# Patient Record
Sex: FEMALE | Race: BLACK OR AFRICAN AMERICAN | NOT HISPANIC OR LATINO | Employment: FULL TIME
[De-identification: names, ages, dates, MRNs, and addresses within clinical notes are randomized per-mention and may not be internally consistent; named-entity substitution may affect disease eponyms.]

---

## 2017-02-01 ENCOUNTER — CHARTING TRANS (OUTPATIENT)
Dept: OTHER | Age: 32
End: 2017-02-01

## 2017-02-08 ENCOUNTER — HOSPITAL ENCOUNTER (EMERGENCY)
Facility: HOSPITAL | Age: 32
Discharge: HOME | End: 2017-02-08
Attending: EMERGENCY MEDICINE
Payer: MEDICAID

## 2017-02-08 VITALS
SYSTOLIC BLOOD PRESSURE: 115 MMHG | BODY MASS INDEX: 18 KG/M2 | HEART RATE: 92 BPM | DIASTOLIC BLOOD PRESSURE: 80 MMHG | WEIGHT: 101 LBS | RESPIRATION RATE: 14 BRPM | TEMPERATURE: 98 F | OXYGEN SATURATION: 99 %

## 2017-02-08 DIAGNOSIS — T85.848A DENTAL IMPLANT PAIN, INITIAL ENCOUNTER: Primary | ICD-10-CM

## 2017-02-08 PROCEDURE — 99282 EMERGENCY DEPT VISIT SF MDM: CPT

## 2017-02-08 RX ORDER — AMOXICILLIN 500 MG/1
500 CAPSULE ORAL 3 TIMES DAILY
COMMUNITY
End: 2017-02-23 | Stop reason: ALTCHOICE

## 2017-02-09 NOTE — ED PROVIDER NOTES
Patient Seen in: [facility] Emergency Department    History   Patient presents with:  Dental Problem (dental)    Stated Complaint: dental pain    HPI    Patient is a 19-year-old who presents with long-standing dental pain.   Says she is having worsen Lungs are clear to auscultation bilaterally. HEART: Regular rate and rhythm,, no  murmurs. ABDOMEN: Soft, nontender, nondistended,   EXTREMITIES: Peripheral pulses are brisk in all 4 extremities. Normal capillary refill.   SKIN: Well perfused, without

## 2017-02-23 ENCOUNTER — APPOINTMENT (OUTPATIENT)
Dept: GENERAL RADIOLOGY | Facility: HOSPITAL | Age: 32
End: 2017-02-23
Payer: MEDICAID

## 2017-02-23 ENCOUNTER — HOSPITAL ENCOUNTER (EMERGENCY)
Facility: HOSPITAL | Age: 32
Discharge: HOME | End: 2017-02-24
Attending: EMERGENCY MEDICINE
Payer: MEDICAID

## 2017-02-23 DIAGNOSIS — R10.13 ABDOMINAL PAIN, EPIGASTRIC: Primary | ICD-10-CM

## 2017-02-23 LAB
ALBUMIN SERPL-MCNC: 3.3 G/DL (ref 3.5–4.8)
ALP LIVER SERPL-CCNC: 66 U/L (ref 37–98)
ALT SERPL-CCNC: 20 U/L (ref 14–54)
AST SERPL-CCNC: 14 U/L (ref 15–41)
BASOPHILS # BLD AUTO: 0.05 X10(3) UL (ref 0–0.1)
BASOPHILS NFR BLD AUTO: 0.8 %
BILIRUB SERPL-MCNC: 0.3 MG/DL (ref 0.1–2)
BUN BLD-MCNC: 11 MG/DL (ref 8–20)
CALCIUM BLD-MCNC: 8.8 MG/DL (ref 8.3–10.3)
CHLORIDE: 110 MMOL/L (ref 101–111)
CO2: 28 MMOL/L (ref 22–32)
CREAT BLD-MCNC: 0.63 MG/DL (ref 0.55–1.02)
EOSINOPHIL # BLD AUTO: 0.26 X10(3) UL (ref 0–0.3)
EOSINOPHIL NFR BLD AUTO: 4.3 %
ERYTHROCYTE [DISTWIDTH] IN BLOOD BY AUTOMATED COUNT: 13 % (ref 11.5–16)
GLUCOSE BLD-MCNC: 90 MG/DL (ref 70–99)
HCT VFR BLD AUTO: 32.3 % (ref 34–50)
HGB BLD-MCNC: 10.8 G/DL (ref 12–16)
IMMATURE GRANULOCYTE COUNT: 0 X10(3) UL (ref 0–1)
IMMATURE GRANULOCYTE RATIO %: 0 %
LIPASE: 237 U/L (ref 73–393)
LYMPHOCYTES # BLD AUTO: 2.75 X10(3) UL (ref 0.9–4)
LYMPHOCYTES NFR BLD AUTO: 45.2 %
M PROTEIN MFR SERPL ELPH: 6.4 G/DL (ref 6.1–8.3)
MCH RBC QN AUTO: 30.9 PG (ref 27–33.2)
MCHC RBC AUTO-ENTMCNC: 33.4 G/DL (ref 31–37)
MCV RBC AUTO: 92.6 FL (ref 81–100)
MONOCYTES # BLD AUTO: 0.45 X10(3) UL (ref 0.1–0.6)
MONOCYTES NFR BLD AUTO: 7.4 %
NEUTROPHIL ABS PRELIM: 2.57 X10 (3) UL (ref 1.3–6.7)
NEUTROPHILS # BLD AUTO: 2.57 X10(3) UL (ref 1.3–6.7)
NEUTROPHILS NFR BLD AUTO: 42.3 %
PLATELET # BLD AUTO: 246 10(3)UL (ref 150–450)
POTASSIUM SERPL-SCNC: 3.9 MMOL/L (ref 3.6–5.1)
RBC # BLD AUTO: 3.49 X10(6)UL (ref 3.8–5.1)
RED CELL DISTRIBUTION WIDTH-SD: 43.9 FL (ref 35.1–46.3)
SODIUM SERPL-SCNC: 142 MMOL/L (ref 136–144)
TROPONIN: <0.046 NG/ML (ref ?–0.05)
WBC # BLD AUTO: 6.1 X10(3) UL (ref 4–13)

## 2017-02-23 PROCEDURE — 84484 ASSAY OF TROPONIN QUANT: CPT | Performed by: EMERGENCY MEDICINE

## 2017-02-23 PROCEDURE — 85025 COMPLETE CBC W/AUTO DIFF WBC: CPT | Performed by: EMERGENCY MEDICINE

## 2017-02-23 PROCEDURE — 36415 COLL VENOUS BLD VENIPUNCTURE: CPT

## 2017-02-23 PROCEDURE — 83690 ASSAY OF LIPASE: CPT | Performed by: EMERGENCY MEDICINE

## 2017-02-23 PROCEDURE — 80053 COMPREHEN METABOLIC PANEL: CPT | Performed by: EMERGENCY MEDICINE

## 2017-02-23 PROCEDURE — 99284 EMERGENCY DEPT VISIT MOD MDM: CPT

## 2017-02-23 PROCEDURE — 71010 XR CHEST AP PORTABLE  (CPT=71010): CPT

## 2017-02-23 PROCEDURE — 93010 ELECTROCARDIOGRAM REPORT: CPT

## 2017-02-23 PROCEDURE — 99285 EMERGENCY DEPT VISIT HI MDM: CPT

## 2017-02-23 PROCEDURE — 93005 ELECTROCARDIOGRAM TRACING: CPT

## 2017-02-23 RX ORDER — ASPIRIN 81 MG/1
324 TABLET, CHEWABLE ORAL ONCE
Status: COMPLETED | OUTPATIENT
Start: 2017-02-23 | End: 2017-02-23

## 2017-02-24 VITALS
SYSTOLIC BLOOD PRESSURE: 102 MMHG | HEIGHT: 63 IN | DIASTOLIC BLOOD PRESSURE: 68 MMHG | BODY MASS INDEX: 19.49 KG/M2 | HEART RATE: 76 BPM | OXYGEN SATURATION: 100 % | TEMPERATURE: 98 F | WEIGHT: 110 LBS | RESPIRATION RATE: 14 BRPM

## 2017-02-24 LAB
ATRIAL RATE: 72 BPM
P AXIS: 76 DEGREES
P-R INTERVAL: 124 MS
Q-T INTERVAL: 394 MS
QRS DURATION: 90 MS
QTC CALCULATION (BEZET): 431 MS
R AXIS: 77 DEGREES
T AXIS: 69 DEGREES
VENTRICULAR RATE: 72 BPM

## 2017-02-24 NOTE — ED INITIAL ASSESSMENT (HPI)
Patient was getting up to go to work and she woke up with sharp pain in her chest, mid-sternal chest pain. She states she also feels bloated. She states the pain is worse with movement.

## 2017-02-24 NOTE — ED PROVIDER NOTES
Patient Seen in: [facility] Emergency Department    History   Patient presents with:  Chest Pain Angina (cardiovascular)    Stated Complaint: Chest Pain    HPI    The patient is a 42-year-old female presenting to the emergency department due to abdo Device 02/23/17 2233 None (Room air)       Current:/68 mmHg  Pulse 76  Temp(Src) 97.5 °F (36.4 °C) (Temporal)  Resp 14  Ht 160 cm (5' 3\")  Wt 49.896 kg  BMI 19.49 kg/m2  SpO2 100%  LMP 02/22/2017 (Exact Date)        Physical Exam    General: Alert a orders. RAINBOW DRAW BLUE   RAINBOW DRAW GOLD   RAINBOW DRAW LAVENDER   RAINBOW DRAW LIGHT GREEN      EKG    Rate, intervals and axes as noted on EKG Report.   Rate: 72  Rhythm: Sinus Rhythm  Reading: Normal sinus rhythm without any ectopy, normal axis, n with her, she felt comfortable going home, she was discharged home in stable condition.       Disposition and Plan     Clinical Impression:  Abdominal pain, epigastric  (primary encounter diagnosis)    Disposition:  Discharge    Follow-up:  Clarissa Daugherty

## 2017-03-10 ENCOUNTER — HOSPITAL ENCOUNTER (EMERGENCY)
Age: 32
Discharge: HOME | End: 2017-03-10
Attending: EMERGENCY MEDICINE
Payer: MEDICAID

## 2017-03-10 VITALS
HEIGHT: 63 IN | TEMPERATURE: 98 F | HEART RATE: 95 BPM | RESPIRATION RATE: 22 BRPM | DIASTOLIC BLOOD PRESSURE: 74 MMHG | SYSTOLIC BLOOD PRESSURE: 109 MMHG | WEIGHT: 110 LBS | BODY MASS INDEX: 19.49 KG/M2 | OXYGEN SATURATION: 100 %

## 2017-03-10 DIAGNOSIS — M54.40 BACK PAIN OF LUMBAR REGION WITH SCIATICA: Primary | ICD-10-CM

## 2017-03-10 PROCEDURE — 99283 EMERGENCY DEPT VISIT LOW MDM: CPT

## 2017-03-10 RX ORDER — METHYLPREDNISOLONE 4 MG/1
TABLET ORAL
Qty: 1 PACKAGE | Refills: 0 | Status: SHIPPED | OUTPATIENT
Start: 2017-03-10 | End: 2017-03-15

## 2017-03-10 RX ORDER — ETODOLAC 400 MG/1
400 TABLET, FILM COATED ORAL 2 TIMES DAILY
Qty: 14 TABLET | Refills: 0 | Status: SHIPPED | OUTPATIENT
Start: 2017-03-10 | End: 2017-03-17

## 2017-03-11 NOTE — ED PROVIDER NOTES
Patient Seen in: [facility] Emergency Department In [place]    History   Patient presents with:  Back Pain (musculoskeletal)    Stated Complaint: back pain    HPI    29-year-old female presents emergency room with chief complaint of left low back pain.   P SpO2 100%  LMP 02/22/2017 (Exact Date)        Physical Exam    GENERAL: Patient is awake, alert, well-appearing, in no acute distress. HEENT: no scleral icterus.   Mucous membranes are moist,  Back: No midline thoracic or lumbar spine tenderness or step-of daily.  Qty: 14 tablet Refills: 0    methylPREDNISolone 4 MG Oral Tablet Therapy Pack  Dosepack: use as directed on packaging  Qty: 1 Package Refills: 0

## 2017-04-05 ENCOUNTER — HOSPITAL ENCOUNTER (EMERGENCY)
Facility: HOSPITAL | Age: 32
Discharge: LEFT BEFORE DISCHARGE | End: 2017-04-05
Payer: MEDICAID

## 2017-05-24 ENCOUNTER — APPOINTMENT (OUTPATIENT)
Dept: GENERAL RADIOLOGY | Facility: HOSPITAL | Age: 32
End: 2017-05-24
Attending: EMERGENCY MEDICINE
Payer: MEDICAID

## 2017-05-24 ENCOUNTER — HOSPITAL ENCOUNTER (EMERGENCY)
Facility: HOSPITAL | Age: 32
Discharge: HOME | End: 2017-05-24
Attending: EMERGENCY MEDICINE
Payer: MEDICAID

## 2017-05-24 VITALS
RESPIRATION RATE: 16 BRPM | SYSTOLIC BLOOD PRESSURE: 106 MMHG | DIASTOLIC BLOOD PRESSURE: 67 MMHG | HEART RATE: 107 BPM | BODY MASS INDEX: 19.49 KG/M2 | HEIGHT: 63 IN | WEIGHT: 110 LBS | TEMPERATURE: 99 F | OXYGEN SATURATION: 100 %

## 2017-05-24 DIAGNOSIS — M54.32 SCIATICA OF LEFT SIDE: Primary | ICD-10-CM

## 2017-05-24 PROCEDURE — 99283 EMERGENCY DEPT VISIT LOW MDM: CPT

## 2017-05-24 PROCEDURE — 81025 URINE PREGNANCY TEST: CPT

## 2017-05-24 RX ORDER — HYDROCODONE BITARTRATE AND ACETAMINOPHEN 5; 325 MG/1; MG/1
1-2 TABLET ORAL EVERY 6 HOURS PRN
Qty: 15 TABLET | Refills: 0 | Status: SHIPPED | OUTPATIENT
Start: 2017-05-24 | End: 2017-07-25

## 2017-05-24 RX ORDER — IBUPROFEN 600 MG/1
600 TABLET ORAL ONCE
Status: DISCONTINUED | OUTPATIENT
Start: 2017-05-24 | End: 2017-05-24

## 2017-05-24 RX ORDER — PREDNISONE 20 MG/1
40 TABLET ORAL DAILY
Qty: 10 TABLET | Refills: 0 | Status: SHIPPED | OUTPATIENT
Start: 2017-05-24 | End: 2017-05-29

## 2017-05-24 RX ORDER — CYCLOBENZAPRINE HCL 10 MG
10 TABLET ORAL 3 TIMES DAILY PRN
COMMUNITY
End: 2017-07-25

## 2017-05-24 NOTE — ED PROVIDER NOTES
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 PSFH elements reviewed from today and agreed except as otherwise stated in HPI.     Physical Exam       ED Triage Vitals   BP 05/24/17 1521 106/67 mmHg   Pulse 05/24/17 1521 107   Resp 05/24/17 1521 16   Temp 05/24/17 1521 98.8 °F (37.1 °C) Deep tendon reflexes are 2+ bilateral both lower extremity's. There is no evidence of any footdrop appreciated. Patient is ambulatory in the ER with a steady gait and no ataxia.           ED Course     Labs Reviewed   POCT PREGNANCY, URINE - Normal

## 2017-05-24 NOTE — ED INITIAL ASSESSMENT (HPI)
Patient with bilateral leg pain, hx of sciatica. Pain started in December, she states the pain is bilateral to both sides of her back. She's been taking tylenol and flexeril at home without improvement.  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

## 2017-06-27 ENCOUNTER — HOSPITAL ENCOUNTER (EMERGENCY)
Facility: HOSPITAL | Age: 32
Discharge: HOME | End: 2017-06-27
Attending: EMERGENCY MEDICINE
Payer: MEDICAID

## 2017-06-27 VITALS
HEART RATE: 97 BPM | DIASTOLIC BLOOD PRESSURE: 66 MMHG | SYSTOLIC BLOOD PRESSURE: 101 MMHG | BODY MASS INDEX: 19.84 KG/M2 | TEMPERATURE: 98 F | WEIGHT: 112 LBS | HEIGHT: 63 IN | OXYGEN SATURATION: 100 % | RESPIRATION RATE: 18 BRPM

## 2017-06-27 DIAGNOSIS — K08.89 TOOTHACHE: Primary | ICD-10-CM

## 2017-06-27 PROCEDURE — 99283 EMERGENCY DEPT VISIT LOW MDM: CPT

## 2017-06-27 RX ORDER — AMOXICILLIN 875 MG/1
875 TABLET, COATED ORAL 2 TIMES DAILY
Qty: 20 TABLET | Refills: 0 | Status: SHIPPED | OUTPATIENT
Start: 2017-06-27 | End: 2017-07-07

## 2017-06-27 RX ORDER — HYDROCODONE BITARTRATE AND ACETAMINOPHEN 5; 325 MG/1; MG/1
1-2 TABLET ORAL EVERY 4 HOURS PRN
Qty: 20 TABLET | Refills: 0 | Status: SHIPPED | OUTPATIENT
Start: 2017-06-27 | End: 2017-07-04

## 2017-06-27 RX ORDER — CHLORHEXIDINE GLUCONATE 0.12 MG/ML
15 RINSE ORAL 2 TIMES DAILY
Qty: 118 ML | Refills: 0 | Status: SHIPPED | OUTPATIENT
Start: 2017-06-27 | End: 2017-07-25

## 2017-06-28 NOTE — ED PROVIDER NOTES
Patient Seen in: [facility] Emergency Department    History   Patient presents with:  Dental Problem (dental)    Stated Complaint: dental pain    HPI    Worsening toothache on the left upper side over the past few days. No fever or chills.   Patient generally healthy  Skin: Warm and dry without cyanosis or pallor  HEENT: No swelling, erythema, tenderness to the face. Tooth #16 is just barely erupting. There is surrounding erythema and gingival tenderness. No drainage. No evidence of abscess.

## 2017-06-28 NOTE — ED NOTES
Patient on call light x2, asking for x2 blankets and x2 popsicles. Patient provided with warm blanket and popsicle.

## 2017-07-25 ENCOUNTER — HOSPITAL ENCOUNTER (EMERGENCY)
Facility: HOSPITAL | Age: 32
Discharge: HOME | End: 2017-07-25
Attending: EMERGENCY MEDICINE
Payer: MEDICAID

## 2017-07-25 VITALS
WEIGHT: 116 LBS | RESPIRATION RATE: 18 BRPM | HEIGHT: 63 IN | BODY MASS INDEX: 20.55 KG/M2 | DIASTOLIC BLOOD PRESSURE: 65 MMHG | TEMPERATURE: 99 F | OXYGEN SATURATION: 99 % | SYSTOLIC BLOOD PRESSURE: 113 MMHG | HEART RATE: 102 BPM

## 2017-07-25 DIAGNOSIS — M54.40 BACK PAIN OF LUMBAR REGION WITH SCIATICA: Primary | ICD-10-CM

## 2017-07-25 PROCEDURE — 99283 EMERGENCY DEPT VISIT LOW MDM: CPT

## 2017-07-25 RX ORDER — HYDROCODONE BITARTRATE AND ACETAMINOPHEN 5; 325 MG/1; MG/1
1-2 TABLET ORAL EVERY 4 HOURS PRN
Qty: 20 TABLET | Refills: 0 | Status: SHIPPED | OUTPATIENT
Start: 2017-07-25 | End: 2017-08-01

## 2017-07-25 RX ORDER — PRENATAL VIT/IRON FUM/FOLIC AC 27MG-0.8MG
1 TABLET ORAL DAILY
COMMUNITY
End: 2018-06-05

## 2017-07-25 NOTE — ED PROVIDER NOTES
Patient Seen in: [facility] Emergency Department    History   Patient presents with:  Back Pain (musculoskeletal)    Stated Complaint: back pain, 20 weeks pregnant.  Talked with OB    HPI    79-year-old female comes the hospital that she complained o 3\")   Wt 52.6 kg   LMP 03/05/2017   SpO2 99%   BMI 20.55 kg/m²         Physical Exam    HEENT : NCAT, EOMI, PEERL, throat clear, neck supple, no JVD, trachea midline, No LAD  Heart: S1S2 normal. No murmurs, regular rate and rhythm  Lungs: Clear to auscult

## 2017-07-25 NOTE — ED INITIAL ASSESSMENT (HPI)
Patient c/o right lower back pain that is radiating down her leg that began yesterday. Patient is 20 weeks pregnant, . Taking extra strength tylenol with no relief.

## 2017-08-11 ENCOUNTER — HOSPITAL ENCOUNTER (EMERGENCY)
Facility: HOSPITAL | Age: 32
Discharge: HOME | End: 2017-08-11
Attending: EMERGENCY MEDICINE
Payer: MEDICAID

## 2017-08-11 VITALS
HEIGHT: 63 IN | OXYGEN SATURATION: 100 % | SYSTOLIC BLOOD PRESSURE: 108 MMHG | DIASTOLIC BLOOD PRESSURE: 61 MMHG | BODY MASS INDEX: 21.79 KG/M2 | HEART RATE: 87 BPM | RESPIRATION RATE: 18 BRPM | TEMPERATURE: 98 F | WEIGHT: 123 LBS

## 2017-08-11 DIAGNOSIS — M54.40 BACK PAIN OF LUMBAR REGION WITH SCIATICA: Primary | ICD-10-CM

## 2017-08-11 PROCEDURE — 99282 EMERGENCY DEPT VISIT SF MDM: CPT

## 2017-08-11 NOTE — ED INITIAL ASSESSMENT (HPI)
Pt sts that she has pain shooting down both legs from lower back. Pain x 3 days. Denies any other complaints at this time.  Pt is 6mo pregnant

## 2017-08-11 NOTE — ED PROVIDER NOTES
Patient Seen in: [facility] Emergency Department    History   No chief complaint on file. Stated Complaint: back pain    HPI    74-year-old female presents with chronic lower back pain that radiates down bilateral legs.   She states that she tried noted above. PSFH elements reviewed from today and agreed except as otherwise stated in HPI.     Physical Exam   ED Triage Vitals [08/11/17 1258]  BP: 108/61  Pulse: 87  Resp: 18  Temp: 98 °F (36.7 °C)  Temp src: Temporal  SpO2: 100 %  O2 Device: None (R as needed. Patient is neurologically intact with no bowel or bladder retention or incontinence reported. She has no weakness or numbness in the lower extremities. Normal gait. Patient eloped after my discussion.         Disposition and Plan     Clinic

## 2017-08-16 ENCOUNTER — CHARTING TRANS (OUTPATIENT)
Dept: OTHER | Age: 32
End: 2017-08-16

## 2017-08-22 ENCOUNTER — HOSPITAL ENCOUNTER (EMERGENCY)
Facility: HOSPITAL | Age: 32
Discharge: HOME | End: 2017-08-22
Payer: MEDICAID

## 2017-08-22 VITALS
HEIGHT: 63 IN | SYSTOLIC BLOOD PRESSURE: 95 MMHG | HEART RATE: 97 BPM | DIASTOLIC BLOOD PRESSURE: 50 MMHG | RESPIRATION RATE: 18 BRPM | OXYGEN SATURATION: 99 %

## 2017-08-22 DIAGNOSIS — M54.40 BACK PAIN OF LUMBAR REGION WITH SCIATICA: Primary | ICD-10-CM

## 2017-08-22 PROCEDURE — 99283 EMERGENCY DEPT VISIT LOW MDM: CPT

## 2017-08-22 RX ORDER — LIDOCAINE 50 MG/G
1 PATCH TOPICAL EVERY 24 HOURS
Qty: 4 PATCH | Refills: 0 | Status: SHIPPED | OUTPATIENT
Start: 2017-08-22 | End: 2017-10-26

## 2017-08-22 RX ORDER — ACETAMINOPHEN 500 MG
1000 TABLET ORAL ONCE
Status: COMPLETED | OUTPATIENT
Start: 2017-08-22 | End: 2017-08-22

## 2017-08-23 NOTE — ED NOTES
Patient being discharged home  Patient demonstrates improvement upon d/c. Pt verbalized understanding of d/c instructions. AVS provided.    Home by self

## 2017-08-23 NOTE — ED NOTES
Agree with triage note. Pt reports sciatica pain after lifting at work and being on feet all day. Hx of back pain and sciatica. Pt seen in ED several times for same. Pt requesting muscle relaxers and narcotics. MD notified.  Pt irritated with staff and kiera

## 2017-08-23 NOTE — ED PROVIDER NOTES
Patient Seen in: [facility] Emergency Department    History   Patient presents with:  Pain (neurologic)    Stated Complaint: sciatica     HPI    Patient presents the ER with complaint of chronic low back pain, right lumbar area radiates into the rig HPI.    Physical Exam   ED Triage Vitals [08/22/17 2111]  BP: 94/69  Pulse: 98  Resp: 18  Temp: n/a  Temp src: n/a  SpO2: 99 %  O2 Device: None (Room air)    Current:BP 95/50   Pulse 97   Resp 18   Ht 160 cm (5' 3\")   LMP 03/05/2017   SpO2 99%   Breastfee that should prompt the patient's immediate return to the emergency department. Reasonable over the counter and prescription treatment options and Physician follow up plan was discussed. The patient is discharged home in good condition.            Dispo

## 2017-09-09 ENCOUNTER — HOSPITAL ENCOUNTER (OUTPATIENT)
Facility: HOSPITAL | Age: 32
Setting detail: OBSERVATION
Discharge: HOME | End: 2017-09-09
Attending: OBSTETRICS & GYNECOLOGY | Admitting: OBSTETRICS & GYNECOLOGY
Payer: MEDICAID

## 2017-09-09 ENCOUNTER — HOSPITAL ENCOUNTER (EMERGENCY)
Facility: HOSPITAL | Age: 32
Discharge: HOME | End: 2017-09-09
Attending: EMERGENCY MEDICINE
Payer: MEDICAID

## 2017-09-09 VITALS
SYSTOLIC BLOOD PRESSURE: 112 MMHG | RESPIRATION RATE: 18 BRPM | TEMPERATURE: 97 F | HEART RATE: 78 BPM | DIASTOLIC BLOOD PRESSURE: 72 MMHG

## 2017-09-09 DIAGNOSIS — K02.9 INFECTED DENTAL CARRIES: Primary | ICD-10-CM

## 2017-09-09 DIAGNOSIS — K04.7 INFECTED DENTAL CARRIES: Primary | ICD-10-CM

## 2017-09-09 PROBLEM — Z34.90 PREGNANCY: Status: ACTIVE | Noted: 2017-09-09

## 2017-09-09 PROBLEM — Z34.90 PREGNANCY (HCC): Status: ACTIVE | Noted: 2017-09-09

## 2017-09-09 LAB
BILIRUBIN URINE: NEGATIVE
BLOOD URINE: NEGATIVE
CONTROL RUN WITHIN 24 HOURS?: YES
GLUCOSE URINE: NEGATIVE
KETONE URINE: NEGATIVE
NITRITE URINE: NEGATIVE
PH URINE: 6 (ref 5–8)
PROTEIN URINE: NEGATIVE
SPEC GRAVITY: >=1.03 (ref 1–1.03)
UROBILINOGEN URINE: 0.2

## 2017-09-09 PROCEDURE — 81002 URINALYSIS NONAUTO W/O SCOPE: CPT

## 2017-09-09 PROCEDURE — 99283 EMERGENCY DEPT VISIT LOW MDM: CPT

## 2017-09-09 RX ORDER — AMOXICILLIN AND CLAVULANATE POTASSIUM 875; 125 MG/1; MG/1
1 TABLET, FILM COATED ORAL 2 TIMES DAILY
Qty: 20 TABLET | Refills: 0 | Status: SHIPPED | OUTPATIENT
Start: 2017-09-09 | End: 2017-09-19

## 2017-09-09 RX ORDER — HYDROCODONE BITARTRATE AND ACETAMINOPHEN 5; 325 MG/1; MG/1
1-2 TABLET ORAL EVERY 6 HOURS PRN
Qty: 15 TABLET | Refills: 0 | Status: SHIPPED | OUTPATIENT
Start: 2017-09-09 | End: 2017-10-26

## 2017-09-10 NOTE — PROGRESS NOTES
Patient unsure what time she used her yeast infection cream last night. States she used it last night at bedtime.

## 2017-09-10 NOTE — ED PROVIDER NOTES
Patient Seen in: [facility] Emergency Department    History   Patient presents with:  Dental Problem (dental)    Stated Complaint: dental pain    HPI    Patient is a 29-year-old female with long-standing history of dental problems who is complaining asymmetry or swelling noted. NECK: There is no focal tenderness to palpation appreciated. There is no JVD. No meningeal signs or nuchal rigidity appreciated. No stridor. LUNGS: Clear to auscultation bilaterally with no wheeze.  There is good equal air ent

## 2017-09-10 NOTE — PROGRESS NOTES
EFM removed. Patient states she is feeling better. Patient encouraged to hydrate by drinking water. Verbal and written discharge instructions given. Patient verbalizes understanding.   Agrees to keep regularly scheduled appointment with her OB on 9/15/1

## 2017-10-09 ENCOUNTER — HOSPITAL ENCOUNTER (EMERGENCY)
Facility: HOSPITAL | Age: 32
Discharge: HOME | End: 2017-10-10
Attending: EMERGENCY MEDICINE
Payer: MEDICAID

## 2017-10-09 ENCOUNTER — HOSPITAL ENCOUNTER (OUTPATIENT)
Facility: HOSPITAL | Age: 32
Setting detail: OBSERVATION
Discharge: ANOTHER HOSPITAL | End: 2017-10-09
Attending: OBSTETRICS & GYNECOLOGY | Admitting: OBSTETRICS & GYNECOLOGY
Payer: MEDICAID

## 2017-10-09 VITALS — DIASTOLIC BLOOD PRESSURE: 56 MMHG | TEMPERATURE: 98 F | SYSTOLIC BLOOD PRESSURE: 106 MMHG | HEART RATE: 77 BPM

## 2017-10-09 DIAGNOSIS — M16.12 OSTEOARTHRITIS OF LEFT HIP, UNSPECIFIED OSTEOARTHRITIS TYPE: ICD-10-CM

## 2017-10-09 DIAGNOSIS — M25.552 CHRONIC HIP PAIN, LEFT: Primary | ICD-10-CM

## 2017-10-09 DIAGNOSIS — G89.29 CHRONIC HIP PAIN, LEFT: Primary | ICD-10-CM

## 2017-10-09 PROCEDURE — 99282 EMERGENCY DEPT VISIT SF MDM: CPT

## 2017-10-09 PROCEDURE — 59025 FETAL NON-STRESS TEST: CPT | Performed by: OBSTETRICS & GYNECOLOGY

## 2017-10-09 RX ORDER — ACETAMINOPHEN 500 MG
1000 TABLET ORAL EVERY 6 HOURS PRN
COMMUNITY
End: 2018-06-05

## 2017-10-10 VITALS
BODY MASS INDEX: 24.27 KG/M2 | SYSTOLIC BLOOD PRESSURE: 101 MMHG | HEART RATE: 73 BPM | HEIGHT: 63 IN | OXYGEN SATURATION: 98 % | WEIGHT: 137 LBS | TEMPERATURE: 99 F | DIASTOLIC BLOOD PRESSURE: 61 MMHG | RESPIRATION RATE: 17 BRPM

## 2017-10-10 NOTE — PROGRESS NOTES
Pt given both written and verbal discharge instructions. Pt verbalized understanding. Pt discharged home in stable condition. Pt taken to lobby via wheelchair by OBT.

## 2017-10-10 NOTE — PROGRESS NOTES
Dr Lorin Ledesmah at bedside and explained at length that there is no medicine to give her at this time for sciatic pain. She explained to patient that she needs to see her OB and perhaps be sent to physical therapy. Pt verbalized understanding.

## 2017-10-10 NOTE — ED INITIAL ASSESSMENT (HPI)
Pt ambulatory to er from her job. Pt reports 8 mos pregnant and left early tonoc - seen and cleared with OB pta. Call from [facility] chg = pt was dischg home and told to follow up with her ob tomorrow for PT.   Pt reports left leg hurting for couple days and her r

## 2017-10-10 NOTE — ED PROVIDER NOTES
Patient Seen in: [facility] Emergency Department    History   Patient presents with:  Back Pain (musculoskeletal)    Stated Complaint: back pain-8 mos preg-cleared    HPI    The patient is a 31-year-old female with a long history of left hip pain wh Temp 98.6 °F (37 °C) (Temporal)   Resp 17   Ht 160 cm (5' 3\")   Wt 62.1 kg   LMP 03/05/2017   SpO2 98%   BMI 24.27 kg/m²         Physical Exam    General: Alert and oriented in no distress. Neuro: CN II-XII intact.  Grossly normal and symmetric motor str at home. MDM     I do have suspicion of drug-seeking behavior this patient. However on review of her x-ray from 2016 she does have significant osteoarthritis of her left hip and a chronic deformity.   I reviewed this imaging with her, and encouraged her t

## 2017-10-10 NOTE — PROGRESS NOTES
29year old  at 32+6 weeks gestation admitted to triage with c/o bad sciatic pain. Pt states pain is 10/10. Efm and toco applied. Pt states that she has been taking extra strength tylenol and it hasn't helped.  Pt has prenatal care at [facility] and should

## 2017-10-10 NOTE — NST
Nonstress Test   Patient: Dannis Skill    Gestation: 32w6d    NST:       Variability: Moderate           Accelerations: Yes           Decelerations: None            Baseline: 140 BPM           Uterine Irritability: No           Contractions: Irregular

## 2017-10-26 ENCOUNTER — HOSPITAL ENCOUNTER (EMERGENCY)
Facility: HOSPITAL | Age: 32
Discharge: HOME | End: 2017-10-26
Attending: EMERGENCY MEDICINE
Payer: MEDICAID

## 2017-10-26 VITALS
WEIGHT: 138 LBS | RESPIRATION RATE: 18 BRPM | SYSTOLIC BLOOD PRESSURE: 124 MMHG | HEIGHT: 63 IN | DIASTOLIC BLOOD PRESSURE: 74 MMHG | OXYGEN SATURATION: 100 % | TEMPERATURE: 98 F | BODY MASS INDEX: 24.45 KG/M2 | HEART RATE: 98 BPM

## 2017-10-26 DIAGNOSIS — K02.9 PAIN DUE TO DENTAL CARIES: Primary | ICD-10-CM

## 2017-10-26 PROCEDURE — 99283 EMERGENCY DEPT VISIT LOW MDM: CPT

## 2017-10-26 RX ORDER — CHLORHEXIDINE GLUCONATE 0.12 MG/ML
15 RINSE ORAL 2 TIMES DAILY
Qty: 473 ML | Refills: 1 | Status: SHIPPED | OUTPATIENT
Start: 2017-10-26 | End: 2018-03-09 | Stop reason: ALTCHOICE

## 2017-10-26 RX ORDER — PENICILLIN V POTASSIUM 500 MG/1
500 TABLET ORAL 4 TIMES DAILY
Qty: 40 TABLET | Refills: 0 | Status: SHIPPED | OUTPATIENT
Start: 2017-10-26 | End: 2017-11-05

## 2017-10-26 RX ORDER — HYDROCODONE BITARTRATE AND ACETAMINOPHEN 5; 325 MG/1; MG/1
1 TABLET ORAL ONCE
Status: DISCONTINUED | OUTPATIENT
Start: 2017-10-26 | End: 2017-10-26

## 2017-10-26 NOTE — ED PROVIDER NOTES
Patient Seen in: [facility] Emergency Department    History   Patient presents with:  Dental Problem (dental)    Stated Complaint: tooth ache    HPI    Dental pain- has broken wisdom tooth-has seen her dentist and he stated that he could pull her to normal reflexes. Skin: Skin is warm and dry.      Uncomfortable appearing -American woman lying on emergency department bed HEENT exam reveals diffuse dental caries and gingival stomatitis including bleeding of her gums in multiple places from just

## 2017-10-26 NOTE — ED INITIAL ASSESSMENT (HPI)
Patient c/o right upper wisdom tooth pain and drainage. Also c/o sore throat that began last. Has tried using tylenol for pain relief. Patient is 8 months pregnant.

## 2017-12-19 ENCOUNTER — HOSPITAL ENCOUNTER (EMERGENCY)
Facility: HOSPITAL | Age: 32
Discharge: HOME | End: 2017-12-19
Attending: EMERGENCY MEDICINE
Payer: MEDICAID

## 2017-12-19 VITALS
TEMPERATURE: 97 F | OXYGEN SATURATION: 98 % | RESPIRATION RATE: 16 BRPM | SYSTOLIC BLOOD PRESSURE: 128 MMHG | HEART RATE: 81 BPM | HEIGHT: 63 IN | DIASTOLIC BLOOD PRESSURE: 66 MMHG | BODY MASS INDEX: 22.86 KG/M2 | WEIGHT: 129 LBS

## 2017-12-19 DIAGNOSIS — M54.41 MIDLINE LOW BACK PAIN WITH BILATERAL SCIATICA, UNSPECIFIED CHRONICITY: Primary | ICD-10-CM

## 2017-12-19 DIAGNOSIS — M54.42 MIDLINE LOW BACK PAIN WITH BILATERAL SCIATICA, UNSPECIFIED CHRONICITY: Primary | ICD-10-CM

## 2017-12-19 PROCEDURE — 87086 URINE CULTURE/COLONY COUNT: CPT | Performed by: EMERGENCY MEDICINE

## 2017-12-19 PROCEDURE — 99283 EMERGENCY DEPT VISIT LOW MDM: CPT

## 2017-12-19 PROCEDURE — 81001 URINALYSIS AUTO W/SCOPE: CPT | Performed by: EMERGENCY MEDICINE

## 2017-12-19 RX ORDER — PREDNISONE 20 MG/1
60 TABLET ORAL DAILY
Qty: 15 TABLET | Refills: 0 | Status: SHIPPED | OUTPATIENT
Start: 2017-12-19 | End: 2017-12-24

## 2017-12-19 RX ORDER — IBUPROFEN 200 MG
200 TABLET ORAL ONCE
Status: COMPLETED | OUTPATIENT
Start: 2017-12-19 | End: 2017-12-19

## 2017-12-19 RX ORDER — ACETAMINOPHEN 500 MG
500 TABLET ORAL ONCE
Status: COMPLETED | OUTPATIENT
Start: 2017-12-19 | End: 2017-12-19

## 2017-12-19 NOTE — ED INITIAL ASSESSMENT (HPI)
Pt c/o back pain that radiates down her legs has had symptoms before her pregnancy and still having them after

## 2017-12-20 NOTE — ED PROVIDER NOTES
Patient Seen in: [facility] Emergency Department    History   Patient presents with:  Back Pain (musculoskeletal)    Stated Complaint: sciatic flare up    HPI    Patient is a 51-year-old female comes emergency room for evaluation of low back pain. extraocular motion is intact, sclerae white, conjunctiva is pink. Oropharynx is unremarkable, no exudate. NECK: Supple, trachea midline, no lymphadenopathy. LUNG: Lungs clear to auscultation bilaterally, no wheezing, no rales, no rhonchi.   CARDIOVASCUL imaging is not acutely indicated. Patient should get outpatient MRI. Patient was instructed to return to emergency room if increased back pain, bowel or bladder incontinence, numbness around rectum, leg weakness or numbness, fever or other problems.   Denisse Barbour

## 2018-01-07 ENCOUNTER — HOSPITAL ENCOUNTER (EMERGENCY)
Facility: HOSPITAL | Age: 33
Discharge: HOME | End: 2018-01-07
Attending: EMERGENCY MEDICINE
Payer: MEDICAID

## 2018-01-07 VITALS
HEIGHT: 63 IN | TEMPERATURE: 98 F | RESPIRATION RATE: 16 BRPM | WEIGHT: 129 LBS | BODY MASS INDEX: 22.86 KG/M2 | SYSTOLIC BLOOD PRESSURE: 129 MMHG | HEART RATE: 101 BPM | DIASTOLIC BLOOD PRESSURE: 90 MMHG | OXYGEN SATURATION: 100 %

## 2018-01-07 DIAGNOSIS — M54.32 SCIATICA OF LEFT SIDE: ICD-10-CM

## 2018-01-07 DIAGNOSIS — S76.012A STRAIN OF LEFT HIP, INITIAL ENCOUNTER: Primary | ICD-10-CM

## 2018-01-07 LAB
POCT LOT NUMBER: NORMAL
POCT URINE PREGNANCY: NEGATIVE

## 2018-01-07 PROCEDURE — 81025 URINE PREGNANCY TEST: CPT

## 2018-01-07 PROCEDURE — 99283 EMERGENCY DEPT VISIT LOW MDM: CPT

## 2018-01-07 RX ORDER — PREDNISONE 20 MG/1
60 TABLET ORAL DAILY
Qty: 15 TABLET | Refills: 0 | Status: SHIPPED | OUTPATIENT
Start: 2018-01-07 | End: 2018-01-12

## 2018-01-07 RX ORDER — METHOCARBAMOL 500 MG/1
500 TABLET, FILM COATED ORAL 4 TIMES DAILY
Qty: 30 TABLET | Refills: 0 | Status: SHIPPED | OUTPATIENT
Start: 2018-01-07 | End: 2018-03-09 | Stop reason: ALTCHOICE

## 2018-01-07 RX ORDER — CYCLOBENZAPRINE HCL 10 MG
10 TABLET ORAL 3 TIMES DAILY PRN
Qty: 20 TABLET | Refills: 0 | Status: SHIPPED | OUTPATIENT
Start: 2018-01-07 | End: 2018-01-14

## 2018-01-07 NOTE — ED NOTES
Pt is requesting Tylenol #3. Pt was advised that she won't receive any narcotic. Per MD, pt will be prescribed Prednisone and muscle relaxant.

## 2018-01-07 NOTE — ED INITIAL ASSESSMENT (HPI)
Pt to ER c/o incisional pain and left leg pain for past couple of months. Pt had  2017. Pt with known history of sciatica.

## 2018-01-07 NOTE — ED PROVIDER NOTES
Patient Seen in: [facility] Emergency Department    History   Patient presents with:  Abdomen/Flank Pain (GI/)    Stated Complaint: Janelle Morales HURJAMES--CSECTION 11/21/17    HPI    This is a 63-year-old female who presents with left sided sciatic respiratory distress. The patient is in no respiratory distress. The patient is not septic or toxic    HEENT: Atraumatic, conjunctiva are not pale. There is no icterus. Oral mucosa Is wet. No facial trauma. The neck is supple.     LUNGS: Clear to auscu has findings of a sciatica I discussed if she is taking any other narcotic pain medications she states that she has not taken any narcotics recently.   By then I had reviewed the [facility] prescription monitoring program and the patient in December had gone Muscle spasms. , Print Script, Disp-20 tablet, R-0    methocarbamol (ROBAXIN) 500 MG Oral Tab  Take 1 tablet (500 mg total) by mouth 4 (four) times daily. , Normal, Disp-30 tablet, R-0

## 2018-03-03 ENCOUNTER — HOSPITAL ENCOUNTER (EMERGENCY)
Facility: HOSPITAL | Age: 33
Discharge: HOME | End: 2018-03-03
Attending: EMERGENCY MEDICINE
Payer: MEDICAID

## 2018-03-03 VITALS
DIASTOLIC BLOOD PRESSURE: 80 MMHG | TEMPERATURE: 98 F | HEIGHT: 63 IN | WEIGHT: 129 LBS | BODY MASS INDEX: 22.86 KG/M2 | RESPIRATION RATE: 18 BRPM | HEART RATE: 89 BPM | OXYGEN SATURATION: 100 % | SYSTOLIC BLOOD PRESSURE: 111 MMHG

## 2018-03-03 DIAGNOSIS — K08.409 STATUS POST TOOTH EXTRACTION: Primary | ICD-10-CM

## 2018-03-03 DIAGNOSIS — K08.89 TOOTHACHE: ICD-10-CM

## 2018-03-03 PROCEDURE — 99283 EMERGENCY DEPT VISIT LOW MDM: CPT

## 2018-03-03 RX ORDER — HYDROCODONE BITARTRATE AND ACETAMINOPHEN 5; 325 MG/1; MG/1
1-2 TABLET ORAL EVERY 6 HOURS PRN
Qty: 20 TABLET | Refills: 0 | Status: SHIPPED | OUTPATIENT
Start: 2018-03-03 | End: 2018-03-13

## 2018-03-03 RX ORDER — AMOXICILLIN 875 MG/1
875 TABLET, COATED ORAL 2 TIMES DAILY
Qty: 14 TABLET | Refills: 0 | Status: SHIPPED | OUTPATIENT
Start: 2018-03-03 | End: 2018-03-09 | Stop reason: ALTCHOICE

## 2018-03-03 NOTE — ED INITIAL ASSESSMENT (HPI)
Pt to ER c/o left dental pain since last Saturday. Has seen her dentist but the pain is coming back.

## 2018-03-03 NOTE — ED PROVIDER NOTES
Patient Seen in: [facility] Emergency Department    History   Patient presents with:  Dental Problem (dental)    Stated Complaint: dental pain    HPI    Patient complains of dental pain.   Patient states that last Saturday, she had a dental extractio erythema or swelling of the surrounding gum. No obvious abscess.   No purulent material         ED Course   Labs Reviewed - No data to display    ED Course as of Mar 03 1140  ------------------------------------------------------------       MDM   I recomm

## 2018-03-09 ENCOUNTER — HOSPITAL ENCOUNTER (EMERGENCY)
Facility: HOSPITAL | Age: 33
Discharge: HOME | End: 2018-03-09
Attending: EMERGENCY MEDICINE
Payer: MEDICAID

## 2018-03-09 VITALS
HEART RATE: 104 BPM | WEIGHT: 129 LBS | TEMPERATURE: 98 F | SYSTOLIC BLOOD PRESSURE: 110 MMHG | RESPIRATION RATE: 16 BRPM | DIASTOLIC BLOOD PRESSURE: 68 MMHG | BODY MASS INDEX: 22.02 KG/M2 | OXYGEN SATURATION: 97 % | HEIGHT: 64 IN

## 2018-03-09 DIAGNOSIS — K08.89 PAIN, DENTAL: Primary | ICD-10-CM

## 2018-03-09 PROCEDURE — 99283 EMERGENCY DEPT VISIT LOW MDM: CPT

## 2018-03-09 RX ORDER — TRAMADOL HYDROCHLORIDE 50 MG/1
TABLET ORAL EVERY 4 HOURS PRN
Qty: 20 TABLET | Refills: 0 | Status: SHIPPED | OUTPATIENT
Start: 2018-03-09 | End: 2018-03-16

## 2018-03-09 RX ORDER — CLINDAMYCIN HYDROCHLORIDE 300 MG/1
300 CAPSULE ORAL 3 TIMES DAILY
Qty: 30 CAPSULE | Refills: 0 | Status: SHIPPED | OUTPATIENT
Start: 2018-03-09 | End: 2018-03-19

## 2018-03-10 NOTE — ED INITIAL ASSESSMENT (HPI)
Pt reports had root canal on Monday - ran out of pain medication yesterday and is having pain in upper left jaw - finished antibiotic prescription yesterday as well

## 2018-03-10 NOTE — ED PROVIDER NOTES
Patient Seen in: [facility] Emergency Department    History   Patient presents with:  Dental Problem (dental)    Stated Complaint: dental pain    HPI    73-year-old female who at the beginning a month had a dental procedure done multiple tooth who i trachea midline, No LAD  Heart: S1S2 normal. No murmurs, regular rate and rhythm  Lungs: Clear to auscultation bilaterally  Abdomen: Soft nontender nondistended normal active bowel sounds without rebound, guarding or masses noted  Back nontender without CV

## 2018-03-17 ENCOUNTER — HOSPITAL ENCOUNTER (EMERGENCY)
Facility: HOSPITAL | Age: 33
Discharge: HOME | End: 2018-03-17
Attending: EMERGENCY MEDICINE
Payer: MEDICAID

## 2018-03-17 VITALS
HEART RATE: 80 BPM | SYSTOLIC BLOOD PRESSURE: 121 MMHG | HEIGHT: 63 IN | TEMPERATURE: 98 F | RESPIRATION RATE: 16 BRPM | DIASTOLIC BLOOD PRESSURE: 79 MMHG | OXYGEN SATURATION: 100 % | BODY MASS INDEX: 22.86 KG/M2 | WEIGHT: 129 LBS

## 2018-03-17 DIAGNOSIS — M54.32 SCIATICA OF LEFT SIDE: Primary | ICD-10-CM

## 2018-03-17 PROCEDURE — 99283 EMERGENCY DEPT VISIT LOW MDM: CPT

## 2018-03-17 PROCEDURE — 99282 EMERGENCY DEPT VISIT SF MDM: CPT

## 2018-03-17 NOTE — ED NOTES
To triage desk to ask for juice, pt advised to not eat or drink until evaluation per MD. Pt reports she would like to know \"how long its going to be because I have to  my daughter\".

## 2018-03-17 NOTE — ED NOTES
Pt sitting in wheelchair. Has walked and came up in triage station to ask for wait times twice since arriving.

## 2018-03-17 NOTE — ED PROVIDER NOTES
Patient Seen in: [facility] Emergency Department    History   Patient presents with:  Musculoskeletal Problem  Dental Problem (dental)    Stated Complaint: hip pain, cracked tooth    HPI    This is a 27-year-old female who arrives here with complain notch.  Extremities: Good pulses bilaterally. Neuro: Alert and oriented. The patient is moving all extremities there is no focal findings.        ED Course   Labs Reviewed - No data to display    ED Course as of Mar 17 1543  --------------------------

## 2018-03-19 ENCOUNTER — HOSPITAL ENCOUNTER (EMERGENCY)
Facility: HOSPITAL | Age: 33
Discharge: HOME | End: 2018-03-19
Attending: EMERGENCY MEDICINE
Payer: MEDICAID

## 2018-03-19 ENCOUNTER — APPOINTMENT (OUTPATIENT)
Dept: GENERAL RADIOLOGY | Facility: HOSPITAL | Age: 33
End: 2018-03-19
Attending: EMERGENCY MEDICINE
Payer: MEDICAID

## 2018-03-19 ENCOUNTER — HOSPITAL (OUTPATIENT)
Dept: OTHER | Age: 33
End: 2018-03-19
Attending: EMERGENCY MEDICINE

## 2018-03-19 ENCOUNTER — TELEPHONE (OUTPATIENT)
Dept: SURGERY | Facility: CLINIC | Age: 33
End: 2018-03-19

## 2018-03-19 VITALS
RESPIRATION RATE: 18 BRPM | SYSTOLIC BLOOD PRESSURE: 133 MMHG | BODY MASS INDEX: 22.86 KG/M2 | TEMPERATURE: 98 F | HEIGHT: 63 IN | HEART RATE: 77 BPM | WEIGHT: 129 LBS | DIASTOLIC BLOOD PRESSURE: 84 MMHG | OXYGEN SATURATION: 100 %

## 2018-03-19 DIAGNOSIS — T07.XXXA MULTIPLE CONTUSIONS: Primary | ICD-10-CM

## 2018-03-19 PROCEDURE — 73502 X-RAY EXAM HIP UNI 2-3 VIEWS: CPT | Performed by: EMERGENCY MEDICINE

## 2018-03-19 PROCEDURE — 99283 EMERGENCY DEPT VISIT LOW MDM: CPT

## 2018-03-19 PROCEDURE — 72190 X-RAY EXAM OF PELVIS: CPT | Performed by: EMERGENCY MEDICINE

## 2018-03-19 RX ORDER — ACETAMINOPHEN 500 MG
1000 TABLET ORAL ONCE
Status: COMPLETED | OUTPATIENT
Start: 2018-03-19 | End: 2018-03-19

## 2018-03-19 NOTE — ED PROVIDER NOTES
Patient Seen in: [facility] Emergency Department    History   Patient presents with:  Back Pain (musculoskeletal)    Stated Complaint: lower back pain    HPI    30-year-old female presents with bilateral hip pain after falling down 6 stairs 1 hour a alignment. No step-offs. No bony tenderness on palpation or percussion. Full range of motion without discomfort. Lungs: good air exchange and clear   Heart: regular rate rhythm and no murmur   Abdomen: Soft and nontender. No abdominal masses.   No salvatore femoral head. SOFT TISSUES:  Negative. No visible soft tissue swelling. EFFUSION:  None visible. OTHER:  Negative.       CONCLUSION:  Stable chronic changes involving the left femoral head and left acetabulum and left iliac bone as described above compared to coming to the emergency room. X-rays ordered. This patient is known to me and concerning for prescription narcotic abuse.   [facility] prescription database evaluation today reveals 17 different prescribers for narcotic pain medication in the past 9 we

## 2018-03-19 NOTE — ED INITIAL ASSESSMENT (HPI)
Pt fell down 6 stairs this am. Pt has pain to L pelvis, L upper leg and R upper leg. Pt able to ambulate to room.

## 2018-03-19 NOTE — TELEPHONE ENCOUNTER
Informed pt the Pain Clinic is unable to assist patient at this time after reviewing office notes. Verbalized understanding, no further questions at this time.

## 2018-04-04 ENCOUNTER — HOSPITAL (OUTPATIENT)
Dept: OTHER | Age: 33
End: 2018-04-04
Attending: EMERGENCY MEDICINE

## 2018-06-05 ENCOUNTER — HOSPITAL ENCOUNTER (EMERGENCY)
Facility: HOSPITAL | Age: 33
Discharge: HOME | End: 2018-06-05
Attending: EMERGENCY MEDICINE
Payer: MEDICAID

## 2018-06-05 VITALS
SYSTOLIC BLOOD PRESSURE: 126 MMHG | RESPIRATION RATE: 16 BRPM | WEIGHT: 129 LBS | DIASTOLIC BLOOD PRESSURE: 78 MMHG | TEMPERATURE: 98 F | OXYGEN SATURATION: 100 % | BODY MASS INDEX: 20.73 KG/M2 | HEIGHT: 66 IN | HEART RATE: 98 BPM

## 2018-06-05 DIAGNOSIS — M54.32 SCIATICA OF LEFT SIDE: Primary | ICD-10-CM

## 2018-06-05 DIAGNOSIS — J02.9 SORE THROAT: ICD-10-CM

## 2018-06-05 PROCEDURE — 99283 EMERGENCY DEPT VISIT LOW MDM: CPT

## 2018-06-05 PROCEDURE — 87430 STREP A AG IA: CPT | Performed by: EMERGENCY MEDICINE

## 2018-06-05 PROCEDURE — 87081 CULTURE SCREEN ONLY: CPT | Performed by: EMERGENCY MEDICINE

## 2018-06-05 NOTE — ED PROVIDER NOTES
Patient Seen in: [facility] Emergency Department    History   Patient presents with:  Sore Throat    Stated Complaint: sore throat, leg pain    HPI    21-year-old with a history of anxiety, arthritis, left hip surgery, sciatica, chronic pain.   She p 128/87  Pulse: 103  Resp: 16  Temp: 97.9 °F (36.6 °C)  Temp src: Temporal  SpO2: 100 %  O2 Device: None (Room air)    Current:/87   Pulse 103   Temp 97.9 °F (36.6 °C) (Temporal)   Resp 16   Ht 167.6 cm (5' 6\")   Wt 58.5 kg   LMP 05/07/2018   SpO2 10 weakness numbness incontinence fever.       [facility] [address] [place] [address] 1/18/1985 5/29/2018 TRAMADOL HCL 50 MG 30/10 Medicaid [employer] [facility]/ [facility] [address] [place] [id number] 1/18/1985 4/17/2018 HYD 1/[birth year]/1987 3/2/2018 ACETAMINOPHEN AND CODEINE PHOSPHATE 300 MG-30 MG 18/5 Insurance [facility] [place],[facility] [id number] 1/18/1985 2/24/2018 CODEINE PHOSPHATE/APAP 300-30MG 200 [facility]

## 2018-06-05 NOTE — ED INITIAL ASSESSMENT (HPI)
Pt to ED c/o sore throat x 1 week denies fevers. Pt also states she has been having left hip pain x 3 days. 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

## 2018-06-28 ENCOUNTER — HOSPITAL ENCOUNTER (EMERGENCY)
Facility: HOSPITAL | Age: 33
Discharge: HOME | End: 2018-06-28
Attending: EMERGENCY MEDICINE
Payer: MEDICAID

## 2018-06-28 VITALS
HEIGHT: 63 IN | SYSTOLIC BLOOD PRESSURE: 121 MMHG | HEART RATE: 71 BPM | DIASTOLIC BLOOD PRESSURE: 81 MMHG | TEMPERATURE: 98 F | WEIGHT: 129 LBS | BODY MASS INDEX: 22.86 KG/M2 | RESPIRATION RATE: 16 BRPM | OXYGEN SATURATION: 99 %

## 2018-06-28 DIAGNOSIS — K08.89 PAIN, DENTAL: Primary | ICD-10-CM

## 2018-06-28 PROCEDURE — 99283 EMERGENCY DEPT VISIT LOW MDM: CPT

## 2018-06-28 RX ORDER — CLINDAMYCIN HYDROCHLORIDE 300 MG/1
300 CAPSULE ORAL 3 TIMES DAILY
Qty: 30 CAPSULE | Refills: 0 | Status: SHIPPED | OUTPATIENT
Start: 2018-06-28 | End: 2018-07-08

## 2018-06-28 NOTE — ED PROVIDER NOTES
Patient Seen in: [facility] Emergency Department    History   Patient presents with:  Dental Problem (dental)    Stated Complaint: tooth pain x 3 days, had tooth pulled thursday    HPI    56-year-old female presents to the emergency department stati thyromegaly. No hoarseness or stridor. Skin is dry without rashes or lesions. Neuro exam: Awake, conversive and moving all 4 extremities well.     ED Course   Labs Reviewed - No data to display    ED Course as of Jun 28 0958  ----------------------------

## 2018-07-29 ENCOUNTER — HOSPITAL ENCOUNTER (EMERGENCY)
Facility: HOSPITAL | Age: 33
Discharge: HOME | End: 2018-07-29
Attending: EMERGENCY MEDICINE
Payer: MEDICAID

## 2018-07-29 VITALS
OXYGEN SATURATION: 100 % | HEART RATE: 84 BPM | TEMPERATURE: 97 F | RESPIRATION RATE: 18 BRPM | BODY MASS INDEX: 22.86 KG/M2 | WEIGHT: 129 LBS | HEIGHT: 63 IN | DIASTOLIC BLOOD PRESSURE: 70 MMHG | SYSTOLIC BLOOD PRESSURE: 116 MMHG

## 2018-07-29 DIAGNOSIS — M54.32 SCIATICA OF LEFT SIDE: Primary | ICD-10-CM

## 2018-07-29 DIAGNOSIS — Z13.828 SCOLIOSIS CONCERN: ICD-10-CM

## 2018-07-29 PROCEDURE — 99283 EMERGENCY DEPT VISIT LOW MDM: CPT

## 2018-07-29 PROCEDURE — 99282 EMERGENCY DEPT VISIT SF MDM: CPT

## 2018-07-29 NOTE — ED INITIAL ASSESSMENT (HPI)
Pt c/o pain down her left leg, Pt was recently diagnosed w/ sciatica;  In addition, Pt also c/o possibly allergic reaction to her left arm where she had an IM injection of Toradol yesterday at [facility], Pt report she is allergic to Motrin

## 2018-07-29 NOTE — ED PROVIDER NOTES
Patient Seen in: [facility] Emergency Department    History   Patient presents with:   Allergic Rxn Allergies (immune)  Hip Pain    Stated Complaint: hip pain/ allerigic reaction    HPI    She presents to the emergency department for concerns about s SpO2 100%   BMI 22.85 kg/m²         Physical Exam    Thin young -American woman lying on an emergency department that her HEENT exam is within normal limits and her neck is supple.   Patient is noticeably higher left shoulder than right spine has sig station and told the nurse and the packet if they could just see clear to write her for a few days of T3's that would get her through.   I am declining to write this patient for narcotics for multiple reasons and do think she should get off chronic narcotic

## 2018-09-19 ENCOUNTER — HOSPITAL ENCOUNTER (EMERGENCY)
Facility: HOSPITAL | Age: 33
Discharge: HOME | End: 2018-09-19
Payer: MEDICARE

## 2018-09-19 VITALS
DIASTOLIC BLOOD PRESSURE: 89 MMHG | HEART RATE: 112 BPM | SYSTOLIC BLOOD PRESSURE: 111 MMHG | WEIGHT: 129 LBS | OXYGEN SATURATION: 100 % | RESPIRATION RATE: 18 BRPM | BODY MASS INDEX: 23 KG/M2

## 2018-09-19 DIAGNOSIS — M25.552 LEFT HIP PAIN: Primary | ICD-10-CM

## 2018-09-19 PROCEDURE — 99283 EMERGENCY DEPT VISIT LOW MDM: CPT

## 2018-09-19 RX ORDER — TRAMADOL HYDROCHLORIDE 50 MG/1
50 TABLET ORAL EVERY 6 HOURS PRN
COMMUNITY
End: 2018-11-05

## 2018-09-19 RX ORDER — PREDNISONE 20 MG/1
40 TABLET ORAL DAILY
Qty: 10 TABLET | Refills: 0 | Status: SHIPPED | OUTPATIENT
Start: 2018-09-19 | End: 2018-09-24

## 2018-09-19 RX ORDER — PREDNISONE 20 MG/1
40 TABLET ORAL ONCE
Status: COMPLETED | OUTPATIENT
Start: 2018-09-19 | End: 2018-09-19

## 2018-09-19 NOTE — ED INITIAL ASSESSMENT (HPI)
Pt c/o left hip and back pain. Radiating down left leg. Pt sees a pain management MD. Pt is speaking clearly. Skin intact. Respirations equal and nonlabored.

## 2018-09-19 NOTE — ED PROVIDER NOTES
Patient Seen in: [facility] Emergency Department    History   Patient presents with:  Pain (neurologic)    Stated Complaint: hip pain    HPI    CHIEF COMPLAINT: Acute on chronic left hip pain    HISTORY OF PRESENT ILLNESS: Patient is a 42-year-old [employer]    Positive for stated complaint: hip pain  Other systems are as noted in HPI. Constitutional and vital signs reviewed. All other systems reviewed and negative except as noted above.     Physical Exam     ED Triage Vitals [09/19/18 1616]   BP inflammatory response secondary to the acute flareup. Patient is allergic to NSAIDs and is unable to take Motrin or Toradol.   Patient is comfortable with this discharge plan will follow-up with her pain clinic either tomorrow, she does have an appointment

## 2018-11-05 ENCOUNTER — HOSPITAL ENCOUNTER (EMERGENCY)
Facility: HOSPITAL | Age: 33
Discharge: LEFT BEFORE DISCHARGE | End: 2018-11-05
Attending: EMERGENCY MEDICINE

## 2018-11-05 VITALS
DIASTOLIC BLOOD PRESSURE: 71 MMHG | TEMPERATURE: 98 F | RESPIRATION RATE: 16 BRPM | HEART RATE: 99 BPM | SYSTOLIC BLOOD PRESSURE: 112 MMHG | OXYGEN SATURATION: 100 % | HEIGHT: 63 IN | WEIGHT: 113 LBS | BODY MASS INDEX: 20.02 KG/M2

## 2018-11-05 DIAGNOSIS — M25.552 PAIN OF BOTH HIP JOINTS: Primary | ICD-10-CM

## 2018-11-05 DIAGNOSIS — M25.551 PAIN OF BOTH HIP JOINTS: Primary | ICD-10-CM

## 2018-11-05 PROCEDURE — 99282 EMERGENCY DEPT VISIT SF MDM: CPT

## 2018-11-05 PROCEDURE — 81025 URINE PREGNANCY TEST: CPT

## 2018-11-05 RX ORDER — DIAZEPAM 2 MG/1
2 TABLET ORAL EVERY 6 HOURS PRN
COMMUNITY
End: 2019-05-19

## 2018-11-05 NOTE — ED NOTES
Pt not in room. Pt left ER without informing any staff. Pt previously stated she wanted norco Rx. Did not wait for x-ray.   Dr. Ethel Robbins notified

## 2018-11-05 NOTE — ED INITIAL ASSESSMENT (HPI)
Pt with numbness and pain and weakness to bilat legs and pain. Pt state she has had this in past and has known back issues. This started 4 days ago and worsening.   States she has \"pinched nerve in back\"

## 2018-11-05 NOTE — ED INITIAL ASSESSMENT (HPI)
Patient having bilateral leg pain that starts up high and goes down leg, pain is throbbing x 3days. Pain meds not helping.

## 2018-11-05 NOTE — ED INITIAL ASSESSMENT (HPI)
Patient asking how long this will take, she has a job interview at [employer]

## 2018-11-05 NOTE — ED PROVIDER NOTES
Patient Seen in: [facility] Emergency Department    History   Patient presents with:  Numbness Weakness (neurologic)    Stated Complaint: bilater leg numbness    HPI    Patient patient was to the emergency department less than 2 months ago.   She rep Patient reports that she had a fall in September that triggered increased pain. The patient states that she not only has pain in her left hip but also in her right hip. There is painful weightbearing and movement.   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 No rhonchi or rales. Heart: Normal S1 and S2, without murmur or rub. Distal dorsalis pedis pulses are strong and symmetric. Abdomen: Soft, nondistended.   Completely nontender, even with deep palpation  Extremit

## 2018-11-05 NOTE — ED NOTES
Pt states norco has worked well in past for pain. Pt states she is unable to drive due to the pain. She has pain at rest and with walking.   States her legs feel like they can [place] out\" when walking

## 2018-11-23 ENCOUNTER — IMAGING SERVICES (OUTPATIENT)
Dept: OTHER | Age: 33
End: 2018-11-23

## 2019-01-07 ENCOUNTER — HOSPITAL ENCOUNTER (EMERGENCY)
Facility: HOSPITAL | Age: 34
Discharge: HOME | End: 2019-01-07
Attending: EMERGENCY MEDICINE
Payer: MEDICAID

## 2019-01-07 VITALS
WEIGHT: 125 LBS | TEMPERATURE: 98 F | DIASTOLIC BLOOD PRESSURE: 79 MMHG | HEART RATE: 93 BPM | RESPIRATION RATE: 17 BRPM | HEIGHT: 63 IN | BODY MASS INDEX: 22.15 KG/M2 | SYSTOLIC BLOOD PRESSURE: 125 MMHG | OXYGEN SATURATION: 98 %

## 2019-01-07 DIAGNOSIS — G89.29 OTHER CHRONIC PAIN: Primary | ICD-10-CM

## 2019-01-07 LAB — POCT URINE PREGNANCY: NEGATIVE

## 2019-01-07 PROCEDURE — 81025 URINE PREGNANCY TEST: CPT | Performed by: EMERGENCY MEDICINE

## 2019-01-07 PROCEDURE — 99283 EMERGENCY DEPT VISIT LOW MDM: CPT | Performed by: EMERGENCY MEDICINE

## 2019-01-07 PROCEDURE — 96372 THER/PROPH/DIAG INJ SC/IM: CPT | Performed by: EMERGENCY MEDICINE

## 2019-01-07 RX ORDER — ONDANSETRON 4 MG/1
4 TABLET, ORALLY DISINTEGRATING ORAL ONCE
Status: COMPLETED | OUTPATIENT
Start: 2019-01-07 | End: 2019-01-07

## 2019-01-07 RX ORDER — GABAPENTIN 300 MG/1
300 CAPSULE ORAL 3 TIMES DAILY
Qty: 30 CAPSULE | Refills: 0 | Status: SHIPPED | OUTPATIENT
Start: 2019-01-07 | End: 2019-05-19

## 2019-01-07 RX ORDER — MORPHINE SULFATE 10 MG/ML
10 INJECTION, SOLUTION INTRAMUSCULAR; INTRAVENOUS ONCE
Status: COMPLETED | OUTPATIENT
Start: 2019-01-07 | End: 2019-01-07

## 2019-01-07 RX ORDER — LIDOCAINE 50 MG/G
2 PATCH TOPICAL ONCE
Status: DISCONTINUED | OUTPATIENT
Start: 2019-01-07 | End: 2019-01-07

## 2019-01-07 RX ORDER — LIDOCAINE 50 MG/G
1 PATCH TOPICAL EVERY 24 HOURS
Qty: 30 PATCH | Refills: 0 | Status: SHIPPED | OUTPATIENT
Start: 2019-01-07 | End: 2019-05-19

## 2019-01-07 NOTE — CM/SW NOTE
Spoke with patient at length about her after visit care. She just switched back to [facility]. She did have an MRI at [facility].  Advised patient to obtain copy of MRI report, navigate [facility] plan website, to find orthopedic specialty to follow up with jenna

## 2019-01-07 NOTE — ED PROVIDER NOTES
Patient Seen in: [facility] Emergency Department    History   Patient presents with:  Pain (neurologic)    Stated Complaint: Vascular sclerosis, pain in back and Rt leg    HPI    72-year-old female presents to the emergency department with complaint appears distressed. HENT:   Head: Normocephalic and atraumatic. Mouth/Throat: Oropharynx is clear and moist.   Eyes: EOM are normal. Pupils are equal, round, and reactive to light. Neck: Normal range of motion. Neck supple.    Cardiovascular: Normal r [address]  [phone]    Schedule an appointment as soon as possible for a visit          Medications Prescribed:  Current Discharge Medication List    START taking these medications    gabapentin 300 MG Oral Cap  Take 1 capsule (300 mg total) by mouth 3 (th

## 2019-01-07 NOTE — ED INITIAL ASSESSMENT (HPI)
PT c/o pain to bilateral hips, PT rpt her current regimen of pain medicine is not helping (I.e. Prednisone & Methadone)

## 2019-05-19 ENCOUNTER — HOSPITAL ENCOUNTER (EMERGENCY)
Facility: HOSPITAL | Age: 34
Discharge: HOME | End: 2019-05-19
Attending: EMERGENCY MEDICINE
Payer: MEDICAID

## 2019-05-19 VITALS
BODY MASS INDEX: 22.15 KG/M2 | RESPIRATION RATE: 18 BRPM | SYSTOLIC BLOOD PRESSURE: 104 MMHG | HEART RATE: 100 BPM | DIASTOLIC BLOOD PRESSURE: 72 MMHG | TEMPERATURE: 98 F | HEIGHT: 63 IN | OXYGEN SATURATION: 99 % | WEIGHT: 125 LBS

## 2019-05-19 DIAGNOSIS — K08.89 PAIN, DENTAL: Primary | ICD-10-CM

## 2019-05-19 PROCEDURE — 99283 EMERGENCY DEPT VISIT LOW MDM: CPT

## 2019-05-19 RX ORDER — TRAMADOL HYDROCHLORIDE 50 MG/1
50 TABLET ORAL ONCE
Status: COMPLETED | OUTPATIENT
Start: 2019-05-19 | End: 2019-05-19

## 2019-05-19 NOTE — ED INITIAL ASSESSMENT (HPI)
Go immediately to the [facility] Emergency Department. Do not eat or drink anything.    Patient having right upper tooth pain that's causing severe headache, unable to eat, drink, or to talk for about a week and a half. Patient states she has a large whole in her tooth.  Patient states she is looking for dentist, as her current dentist had a h

## 2019-05-19 NOTE — ED PROVIDER NOTES
Patient Seen in: [facility] Emergency Department    History   Patient presents with:  Dental Problem (dental)    Stated Complaint: dental pain     HPI    68-year-old complaining of having dental pain.   The patient says that she ate an apple earlier discomfort. She is given tramadol here in the emergency department told to continue with Tylenol as an outpatient.     ED Course   Labs Reviewed - No data to display             MDM   She is told she needs to follow-up with a dentist at the next soonest ap

## 2019-05-19 NOTE — ED NOTES
Pt stated she has dental pain. Asked pt her name and . Pt became irritated with RN and sts \"aren't you listening to me? \" Informed pt this RN is listening. Pt asked how long the wait is, pt informed.

## 2019-07-07 ENCOUNTER — HOSPITAL ENCOUNTER (EMERGENCY)
Facility: HOSPITAL | Age: 34
Discharge: HOME | End: 2019-07-07
Attending: EMERGENCY MEDICINE
Payer: MEDICAID

## 2019-07-07 VITALS
HEART RATE: 88 BPM | DIASTOLIC BLOOD PRESSURE: 76 MMHG | OXYGEN SATURATION: 99 % | SYSTOLIC BLOOD PRESSURE: 112 MMHG | RESPIRATION RATE: 16 BRPM | HEIGHT: 63 IN | TEMPERATURE: 98 F | WEIGHT: 120 LBS | BODY MASS INDEX: 21.26 KG/M2

## 2019-07-07 DIAGNOSIS — M25.552 CHRONIC LEFT HIP PAIN: Primary | ICD-10-CM

## 2019-07-07 DIAGNOSIS — G89.29 CHRONIC LEFT HIP PAIN: Primary | ICD-10-CM

## 2019-07-07 PROCEDURE — 99283 EMERGENCY DEPT VISIT LOW MDM: CPT

## 2019-07-07 RX ORDER — TRAMADOL HYDROCHLORIDE 50 MG/1
100 TABLET ORAL ONCE
Status: COMPLETED | OUTPATIENT
Start: 2019-07-07 | End: 2019-07-07

## 2019-07-07 RX ORDER — ACETAMINOPHEN 500 MG
1000 TABLET ORAL ONCE
Status: COMPLETED | OUTPATIENT
Start: 2019-07-07 | End: 2019-07-07

## 2019-07-07 NOTE — ED NOTES
Upon arrival to room patient had transferred self from wheelchair to bed, asking registration and volunteer to get her some pain medications. RN at bedside with warm blankets. Patient stating that she had someone drop her off.  Asking for nurse to remove he

## 2019-07-07 NOTE — ED NOTES
contacted d/t patient reporting that she cannot find a md who accepts her insurance [FreeTextEntry1] : New patient consultation, pelvic mass in pregnancy

## 2019-07-07 NOTE — ED INITIAL ASSESSMENT (HPI)
Complaint of bilateral leg pain. States \"it keeps locking up. \" Denies any recent falls/trauma/injury.

## 2019-07-07 NOTE — ED NOTES
Patient calling out on call light, RN to bedside. Patient asking for pain medications. RN again explained to patient that MD would have to prescribe the medications and that the MD would be in as soon as possible.  Patient provided third warm blanket per re

## 2019-07-07 NOTE — ED NOTES
Patient calling out for RN, RN at bedside. Patient had changed back into gown and states that the Doctor stated she was going to give her something for pain and that she would like that now.

## 2019-07-08 NOTE — ED PROVIDER NOTES
Patient Seen in: [facility] Emergency Department    History   Patient presents with:  Fatigue (constitutional, neurologic)    Stated Complaint: feels like legs will lock    HPI    Patient presents with leg pain.   The patient states that she has had Tenderness diffusely in the left hip as well as bilateral ankles, no edema or deformity, normal ROM of these joints, distal pedal pulses intact laterally. Skin: No bruising or erythema.   Neuro: Distal sensory and motor exams intact in lower extremities bi

## 2019-07-08 NOTE — ED NOTES
Patient had ambulated from D1 to nurses station, asking if RN was able to update MD on mediations. RN explained that MD was at bedside of another patients room and that as soon as she came out that she would update her.  Patient ambulated back to room

## 2019-07-08 NOTE — CM/SW NOTE
Asked to see patient who stated she is not able to locate a local provider through [place]. Spoke with patient. Provided name and contact info of current [place] MD Dr. Lang Mak.  Patient became verbally abusive stating this MD no longer accepts

## 2019-07-08 NOTE — ED NOTES
Patient calling out,  had walked into room to recommend primary care physician to patient. Upon RN entrance to room patient yelling at  \"Just get the fuck out of my room\".   remained respectful to patient and attempted

## 2019-07-26 ENCOUNTER — HOSPITAL (OUTPATIENT)
Dept: OTHER | Age: 34
End: 2019-07-26

## 2019-08-06 ENCOUNTER — HOSPITAL (OUTPATIENT)
Dept: OTHER | Age: 34
End: 2019-08-06

## 2019-08-07 ENCOUNTER — APPOINTMENT (OUTPATIENT)
Dept: GENERAL RADIOLOGY | Facility: HOSPITAL | Age: 34
End: 2019-08-07
Attending: EMERGENCY MEDICINE
Payer: MEDICAID

## 2019-08-07 ENCOUNTER — HOSPITAL ENCOUNTER (EMERGENCY)
Facility: HOSPITAL | Age: 34
Discharge: HOME | End: 2019-08-07
Attending: EMERGENCY MEDICINE
Payer: MEDICAID

## 2019-08-07 VITALS
OXYGEN SATURATION: 98 % | DIASTOLIC BLOOD PRESSURE: 78 MMHG | SYSTOLIC BLOOD PRESSURE: 110 MMHG | RESPIRATION RATE: 18 BRPM | TEMPERATURE: 98 F | HEART RATE: 88 BPM

## 2019-08-07 DIAGNOSIS — M16.12 ARTHRITIS OF LEFT HIP: ICD-10-CM

## 2019-08-07 DIAGNOSIS — M25.551 CHRONIC PAIN OF BOTH HIPS: Primary | ICD-10-CM

## 2019-08-07 DIAGNOSIS — G89.29 CHRONIC MIDLINE LOW BACK PAIN, UNSPECIFIED WHETHER SCIATICA PRESENT: ICD-10-CM

## 2019-08-07 DIAGNOSIS — M25.552 CHRONIC PAIN OF BOTH HIPS: Primary | ICD-10-CM

## 2019-08-07 DIAGNOSIS — G89.29 CHRONIC PAIN OF BOTH HIPS: Primary | ICD-10-CM

## 2019-08-07 DIAGNOSIS — M54.50 CHRONIC MIDLINE LOW BACK PAIN, UNSPECIFIED WHETHER SCIATICA PRESENT: ICD-10-CM

## 2019-08-07 DIAGNOSIS — Z76.5 DRUG-SEEKING BEHAVIOR: ICD-10-CM

## 2019-08-07 LAB — B-HCG UR QL: NEGATIVE

## 2019-08-07 PROCEDURE — 81025 URINE PREGNANCY TEST: CPT

## 2019-08-07 PROCEDURE — 99284 EMERGENCY DEPT VISIT MOD MDM: CPT

## 2019-08-07 PROCEDURE — 72100 X-RAY EXAM L-S SPINE 2/3 VWS: CPT | Performed by: EMERGENCY MEDICINE

## 2019-08-07 PROCEDURE — 72170 X-RAY EXAM OF PELVIS: CPT | Performed by: EMERGENCY MEDICINE

## 2019-08-07 RX ORDER — HYDROCODONE BITARTRATE AND ACETAMINOPHEN 5; 325 MG/1; MG/1
2 TABLET ORAL ONCE
Status: COMPLETED | OUTPATIENT
Start: 2019-08-07 | End: 2019-08-07

## 2019-08-07 RX ORDER — ACETAMINOPHEN 500 MG
1000 TABLET ORAL ONCE
Status: DISCONTINUED | OUTPATIENT
Start: 2019-08-07 | End: 2019-08-07

## 2019-08-07 NOTE — ED PROVIDER NOTES
Patient Seen in: [facility] AND [facility] Emergency Department    History   Patient presents with:  Lower Extremity Injury (musculoskeletal)    Stated Complaint: hip pain    HPI    Patient is here because of hip pain bilateral she states she has chronically.   Don Melo Current:/78   Pulse 88   Temp 98 °F (36.7 °C) (Oral)   Resp 18   LMP 08/02/2019 (Exact Date)   SpO2 98%         Physical Exam  Constitutional:  Alert, well nourished adult lying in bed in no distress. Vital signs noted.   Eye:  No scleral icter pain medication today. After talking with her we will give her 1 tablet of Norco before she goes. I did offer her to talk to . I did offer orthopedic but she states she has that information. She just has not followed up.   Patient did direct

## 2019-08-07 NOTE — ED INITIAL ASSESSMENT (HPI)
Pt arrived per EMS. States driving to hospital for pain control, but couldn't finish driving due to pain in Lt hip and Rt upper leg. States pain 10/10. States has a medical condition in lt hip. States recently had cellulitis, is concerned it might be back.

## 2019-08-27 ENCOUNTER — HOSPITAL ENCOUNTER (EMERGENCY)
Facility: HOSPITAL | Age: 34
Discharge: HOME | End: 2019-08-27
Attending: EMERGENCY MEDICINE
Payer: MEDICAID

## 2019-08-27 VITALS
DIASTOLIC BLOOD PRESSURE: 80 MMHG | OXYGEN SATURATION: 100 % | BODY MASS INDEX: 21.26 KG/M2 | SYSTOLIC BLOOD PRESSURE: 122 MMHG | TEMPERATURE: 98 F | HEIGHT: 63 IN | WEIGHT: 120 LBS | RESPIRATION RATE: 18 BRPM | HEART RATE: 89 BPM

## 2019-08-27 DIAGNOSIS — G89.29 OTHER CHRONIC PAIN: ICD-10-CM

## 2019-08-27 DIAGNOSIS — M87.00 AVASCULAR NECROSIS (HCC): Primary | ICD-10-CM

## 2019-08-27 PROCEDURE — 99282 EMERGENCY DEPT VISIT SF MDM: CPT

## 2019-08-27 NOTE — ED INITIAL ASSESSMENT (HPI)
Pt states \"I need morphine! \" states has back issue x years and its getting worse, my pain meds has worn off

## 2019-08-27 NOTE — CM/SW NOTE
Patient known to this worker from visit 7/7/19. She has chronic pain. Out of network for follow up with [facility].      At request of ER MD patient was given list from [employer] website of in-network Pain Management MDs and Orthopedic Surgeons who ar

## 2019-08-27 NOTE — ED PROVIDER NOTES
Patient Seen in: [facility] Emergency Department    History   Patient presents with:  Back Pain (musculoskeletal)    Stated Complaint: BACK PAIN,BONE TO BONE PAIN    HPI    Patient is a 62-year-old female presents to ED for evaluation of chronic lef [08/27/19 0911]   /83   Pulse 100   Resp 18   Temp 98 °F (36.7 °C)   Temp src    SpO2 100 %   O2 Device None (Room air)       Current:/83   Pulse 100   Temp 98 °F (36.7 °C)   Resp 18   Ht 160 cm (5' 3\")   Wt 54.4 kg   LMP 08/02/2019 (Exact [place] decompression for her symptoms. Tylenol for pain. Patient was screened and evaluated during this visit.    As a treating physician attending to the patient, I determined, within reasonable clinical confidence and prior to discharge, that an emergency me

## 2019-10-02 ENCOUNTER — HOSPITAL (OUTPATIENT)
Dept: OTHER | Age: 34
End: 2019-10-02

## 2019-10-24 ENCOUNTER — HOSPITAL ENCOUNTER (EMERGENCY)
Facility: HOSPITAL | Age: 34
Discharge: HOME | End: 2019-10-24
Payer: MEDICAID

## 2019-10-24 VITALS
OXYGEN SATURATION: 100 % | HEIGHT: 63 IN | HEART RATE: 69 BPM | SYSTOLIC BLOOD PRESSURE: 105 MMHG | BODY MASS INDEX: 21.26 KG/M2 | WEIGHT: 120 LBS | TEMPERATURE: 98 F | DIASTOLIC BLOOD PRESSURE: 72 MMHG | RESPIRATION RATE: 16 BRPM

## 2019-10-24 DIAGNOSIS — M25.511 ACUTE PAIN OF BOTH SHOULDERS: ICD-10-CM

## 2019-10-24 DIAGNOSIS — M25.512 ACUTE PAIN OF BOTH SHOULDERS: ICD-10-CM
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
## 2019-10-24 DIAGNOSIS — M25.551 PAIN OF BOTH HIP JOINTS: ICD-10-CM

## 2019-10-24 DIAGNOSIS — M25.552 PAIN OF BOTH HIP JOINTS: ICD-10-CM

## 2019-10-24 PROCEDURE — 99283 EMERGENCY DEPT VISIT LOW MDM: CPT

## 2019-10-24 RX ORDER — METAXALONE 800 MG/1
800 TABLET ORAL 3 TIMES DAILY
Qty: 12 TABLET | Refills: 0 | Status: SHIPPED | OUTPATIENT
Start: 2019-10-24 | End: 2020-01-31

## 2019-10-24 NOTE — ED NOTES
Patient used call light and PCT responded to light.  PCT walked into room and said \"how can I help you\", patient responded with a very rude and inpatient tone and stated \"why ain't the doctor in here yet, I've been here for over an hour and ain't no one
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
## 2019-10-24 NOTE — ED NOTES
Pt very rude on arrival and raises her voice to this RN by saying Lisa Villegas you listening to me? \" Assured pt this RN was listening to her. Pt sts \"well I don't think you are! \" Asked pt to lower her voice.
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
## 2019-10-24 NOTE — ED NOTES
Pt states she has a friend waiting for her and would like her paperwork right now. Pt informed that NP is working on them and they will be printed as soon as available. She states she needs to tell her friend to wait and will be back.  Pt ambulatory out of

## 2019-10-24 NOTE — ED NOTES
Pt ambulatory to room with steady gait. Demanding pain medication.  Pt informed that practitioner needs to evaluate first. She would like to be seen by MD prior to assessment

## 2019-12-11 ENCOUNTER — HOSPITAL (OUTPATIENT)
Dept: OTHER | Age: 34
End: 2019-12-11

## 2019-12-11 LAB
AMORPH SED URNS QL MICRO: ABNORMAL
ANALYZER ANC (IANC): ABNORMAL
ANION GAP SERPL CALC-SCNC: 12 MMOL/L (ref 10–20)
APPEARANCE UR: ABNORMAL
BACTERIA #/AREA URNS HPF: ABNORMAL /HPF
BASOPHILS # BLD: 0 K/MCL (ref 0–0.3)
BASOPHILS NFR BLD: 0 %
BILIRUB UR QL: NEGATIVE
BUN SERPL-MCNC: 11 MG/DL (ref 6–20)
BUN/CREAT SERPL: 18 (ref 7–25)
CALCIUM SERPL-MCNC: 8.8 MG/DL (ref 8.4–10.2)
CAOX CRY URNS QL MICRO: ABNORMAL
CHLORIDE SERPL-SCNC: 106 MMOL/L (ref 98–107)
CO2 SERPL-SCNC: 25 MMOL/L (ref 21–32)
COLOR UR: YELLOW
CREAT SERPL-MCNC: 0.6 MG/DL (ref 0.51–0.95)
DIFFERENTIAL METHOD BLD: ABNORMAL
EOSINOPHIL # BLD: 0 K/MCL (ref 0.1–0.5)
EOSINOPHIL NFR BLD: 1 %
EPITH CASTS #/AREA URNS LPF: ABNORMAL /[LPF]
ERYTHROCYTE [DISTWIDTH] IN BLOOD: 13.9 % (ref 11–15)
FATTY CASTS #/AREA URNS LPF: ABNORMAL /[LPF]
GLUCOSE SERPL-MCNC: 205 MG/DL (ref 65–99)
GLUCOSE UR-MCNC: >500 MG/DL
GRAN CASTS #/AREA URNS LPF: ABNORMAL /[LPF]
HCG POINT OF CARE (5HGRST): NEGATIVE
HCT VFR BLD CALC: 34.5 % (ref 36–46.5)
HGB BLD-MCNC: 10.8 G/DL (ref 12–15.5)
HGB UR QL: NEGATIVE
HYALINE CASTS #/AREA URNS LPF: ABNORMAL /LPF (ref 0–5)
IMM GRANULOCYTES # BLD AUTO: 0 K/MCL (ref 0–0.2)
IMM GRANULOCYTES NFR BLD: 0 %
KETONES UR-MCNC: ABNORMAL MG/DL
LEUKOCYTE ESTERASE UR QL STRIP: NEGATIVE
LYMPHOCYTES # BLD: 1.6 K/MCL (ref 1–4.8)
LYMPHOCYTES NFR BLD: 28 %
MCH RBC QN AUTO: 27.8 PG (ref 26–34)
MCHC RBC AUTO-ENTMCNC: 31.3 G/DL (ref 32–36.5)
MCV RBC AUTO: 88.7 FL (ref 78–100)
MIXED CELL CASTS #/AREA URNS LPF: ABNORMAL /[LPF]
MONOCYTES # BLD: 0.3 K/MCL (ref 0.3–0.9)
MONOCYTES NFR BLD: 6 %
MUCOUS THREADS URNS QL MICRO: PRESENT
NEUTROPHILS # BLD: 3.7 K/MCL (ref 1.8–7.7)
NEUTROPHILS NFR BLD: 65 %
NEUTS SEG NFR BLD: ABNORMAL %
NITRITE UR QL: NEGATIVE
NRBC (NRBCRE): 0 /100 WBC
PH UR: 6 UNITS (ref 5–7)
PLATELET # BLD: 365 K/MCL (ref 140–450)
POTASSIUM SERPL-SCNC: 3.5 MMOL/L (ref 3.4–5.1)
PROT UR QL: 100 MG/DL
RBC # BLD: 3.89 MIL/MCL (ref 4–5.2)
RBC #/AREA URNS HPF: ABNORMAL /HPF (ref 0–2)
RBC CASTS #/AREA URNS LPF: ABNORMAL /[LPF]
RENAL EPI CELLS #/AREA URNS HPF: ABNORMAL /[HPF]
SODIUM SERPL-SCNC: 139 MMOL/L (ref 135–145)
SP GR UR: 1.02 (ref 1–1.03)
SPECIMEN SOURCE: ABNORMAL
SPERM URNS QL MICRO: ABNORMAL
SQUAMOUS #/AREA URNS HPF: ABNORMAL /HPF (ref 0–5)
T VAGINALIS URNS QL MICRO: ABNORMAL
TRI-PHOS CRY URNS QL MICRO: ABNORMAL
URATE CRY URNS QL MICRO: ABNORMAL
URINE REFLEX: ABNORMAL
URNS CMNT MICRO: ABNORMAL
UROBILINOGEN UR QL: 0.2 MG/DL (ref 0–1)
WAXY CASTS #/AREA URNS LPF: ABNORMAL /[LPF]
WBC # BLD: 5.6 K/MCL (ref 4.2–11)
WBC #/AREA URNS HPF: ABNORMAL /HPF (ref 0–5)
WBC CASTS #/AREA URNS LPF: ABNORMAL /[LPF]
YEAST HYPHAE URNS QL MICRO: ABNORMAL
YEAST URNS QL MICRO: ABNORMAL

## 2020-01-03 ENCOUNTER — HOSPITAL ENCOUNTER (EMERGENCY)
Facility: HOSPITAL | Age: 35
Discharge: HOME | End: 2020-01-03
Attending: EMERGENCY MEDICINE
Payer: MEDICAID

## 2020-01-03 ENCOUNTER — APPOINTMENT (OUTPATIENT)
Dept: ULTRASOUND IMAGING | Facility: HOSPITAL | Age: 35
End: 2020-01-03
Attending: EMERGENCY MEDICINE
Payer: MEDICAID

## 2020-01-03 VITALS
BODY MASS INDEX: 21.26 KG/M2 | DIASTOLIC BLOOD PRESSURE: 66 MMHG | HEIGHT: 63 IN | OXYGEN SATURATION: 97 % | SYSTOLIC BLOOD PRESSURE: 119 MMHG | HEART RATE: 88 BPM | TEMPERATURE: 99 F | WEIGHT: 120 LBS | RESPIRATION RATE: 18 BRPM

## 2020-01-03 DIAGNOSIS — N83.201 CYST OF RIGHT OVARY: Primary | ICD-10-CM

## 2020-01-03 LAB
ALBUMIN SERPL-MCNC: 3.4 G/DL (ref 3.4–5)
ALBUMIN/GLOB SERPL: 1 {RATIO} (ref 1–2)
ALP LIVER SERPL-CCNC: 74 U/L (ref 37–98)
ALT SERPL-CCNC: 18 U/L (ref 13–56)
ANION GAP SERPL CALC-SCNC: 3 MMOL/L (ref 0–18)
AST SERPL-CCNC: 15 U/L (ref 15–37)
BASOPHILS # BLD AUTO: 0.03 X10(3) UL (ref 0–0.2)
BASOPHILS NFR BLD AUTO: 0.8 %
BILIRUB SERPL-MCNC: 0.2 MG/DL (ref 0.1–2)
BILIRUB UR QL STRIP.AUTO: NEGATIVE
BUN BLD-MCNC: 6 MG/DL (ref 7–18)
BUN/CREAT SERPL: 8.8 (ref 10–20)
CALCIUM BLD-MCNC: 8.5 MG/DL (ref 8.5–10.1)
CHLORIDE SERPL-SCNC: 109 MMOL/L (ref 98–112)
CO2 SERPL-SCNC: 27 MMOL/L (ref 21–32)
COLOR UR AUTO: COLORLESS
CREAT BLD-MCNC: 0.68 MG/DL (ref 0.55–1.02)
DEPRECATED RDW RBC AUTO: 44.1 FL (ref 35.1–46.3)
EOSINOPHIL # BLD AUTO: 0.11 X10(3) UL (ref 0–0.7)
EOSINOPHIL NFR BLD AUTO: 2.8 %
ERYTHROCYTE [DISTWIDTH] IN BLOOD BY AUTOMATED COUNT: 13.4 % (ref 11–15)
GLOBULIN PLAS-MCNC: 3.4 G/DL (ref 2.8–4.4)
GLUCOSE BLD-MCNC: 69 MG/DL (ref 70–99)
GLUCOSE UR STRIP.AUTO-MCNC: NEGATIVE MG/DL
HCT VFR BLD AUTO: 30 % (ref 35–48)
HGB BLD-MCNC: 9.4 G/DL (ref 12–16)
IMM GRANULOCYTES # BLD AUTO: 0.01 X10(3) UL (ref 0–1)
IMM GRANULOCYTES NFR BLD: 0.3 %
KETONES UR STRIP.AUTO-MCNC: NEGATIVE MG/DL
LYMPHOCYTES # BLD AUTO: 1.69 X10(3) UL (ref 1–4)
LYMPHOCYTES NFR BLD AUTO: 43.3 %
M PROTEIN MFR SERPL ELPH: 6.8 G/DL (ref 6.4–8.2)
MCH RBC QN AUTO: 28.1 PG (ref 26–34)
MCHC RBC AUTO-ENTMCNC: 31.3 G/DL (ref 31–37)
MCV RBC AUTO: 89.8 FL (ref 80–100)
MONOCYTES # BLD AUTO: 0.35 X10(3) UL (ref 0.1–1)
MONOCYTES NFR BLD AUTO: 9 %
NEUTROPHILS # BLD AUTO: 1.71 X10 (3) UL (ref 1.5–7.7)
NEUTROPHILS # BLD AUTO: 1.71 X10(3) UL (ref 1.5–7.7)
NEUTROPHILS NFR BLD AUTO: 43.8 %
NITRITE UR QL STRIP.AUTO: NEGATIVE
OSMOLALITY SERPL CALC.SUM OF ELEC: 284 MOSM/KG (ref 275–295)
PH UR STRIP.AUTO: 5 [PH] (ref 4.5–8)
PLATELET # BLD AUTO: 330 10(3)UL (ref 150–450)
POCT LOT NUMBER: NORMAL
POCT URINE PREGNANCY: NEGATIVE
POTASSIUM SERPL-SCNC: 4.1 MMOL/L (ref 3.5–5.1)
PROT UR STRIP.AUTO-MCNC: NEGATIVE MG/DL
RBC # BLD AUTO: 3.34 X10(6)UL (ref 3.8–5.3)
RBC UR QL AUTO: NEGATIVE
SODIUM SERPL-SCNC: 139 MMOL/L (ref 136–145)
SP GR UR STRIP.AUTO: <1.005 (ref 1–1.03)
UROBILINOGEN UR STRIP.AUTO-MCNC: <2 MG/DL
WBC # BLD AUTO: 3.9 X10(3) UL (ref 4–11)

## 2020-01-03 PROCEDURE — 76856 US EXAM PELVIC COMPLETE: CPT | Performed by: EMERGENCY MEDICINE

## 2020-01-03 PROCEDURE — 93975 VASCULAR STUDY: CPT | Performed by: EMERGENCY MEDICINE

## 2020-01-03 PROCEDURE — 80053 COMPREHEN METABOLIC PANEL: CPT | Performed by: EMERGENCY MEDICINE

## 2020-01-03 PROCEDURE — 76830 TRANSVAGINAL US NON-OB: CPT | Performed by: EMERGENCY MEDICINE

## 2020-01-03 PROCEDURE — 99284 EMERGENCY DEPT VISIT MOD MDM: CPT | Performed by: EMERGENCY MEDICINE

## 2020-01-03 PROCEDURE — 81025 URINE PREGNANCY TEST: CPT | Performed by: EMERGENCY MEDICINE

## 2020-01-03 PROCEDURE — 96361 HYDRATE IV INFUSION ADD-ON: CPT | Performed by: EMERGENCY MEDICINE

## 2020-01-03 PROCEDURE — 81001 URINALYSIS AUTO W/SCOPE: CPT | Performed by: EMERGENCY MEDICINE

## 2020-01-03 PROCEDURE — 85025 COMPLETE CBC W/AUTO DIFF WBC: CPT | Performed by: EMERGENCY MEDICINE

## 2020-01-03 PROCEDURE — 96360 HYDRATION IV INFUSION INIT: CPT | Performed by: EMERGENCY MEDICINE

## 2020-01-03 RX ORDER — DICYCLOMINE HYDROCHLORIDE 10 MG/ML
20 INJECTION INTRAMUSCULAR ONCE
Status: DISCONTINUED | OUTPATIENT
Start: 2020-01-03 | End: 2020-01-03

## 2020-01-03 RX ORDER — ACETAMINOPHEN 500 MG
1000 TABLET ORAL ONCE
Status: COMPLETED | OUTPATIENT
Start: 2020-01-03 | End: 2020-01-03

## 2020-01-03 RX ORDER — SODIUM CHLORIDE 9 MG/ML
1000 INJECTION, SOLUTION INTRAVENOUS ONCE
Status: COMPLETED | OUTPATIENT
Start: 2020-01-03 | End: 2020-01-03

## 2020-01-03 RX ORDER — SODIUM CHLORIDE 9 MG/ML
1000 INJECTION, SOLUTION INTRAVENOUS ONCE
Status: DISCONTINUED | OUTPATIENT
Start: 2020-01-03 | End: 2020-01-03

## 2020-01-03 NOTE — ED INITIAL ASSESSMENT (HPI)
Reports \"I need something for pain. I have an ovarian cyst on the R side and tylenol isn't helping. I can't take Ibuprofen\". Denies n /v.

## 2020-01-03 NOTE — ED NOTES
Patient is at the desk and reports that she is going to leave. Patient then used the bathroom and came back to the desk. She reports that she will wait for the doctor now and went back to her room.

## 2020-01-03 NOTE — ED NOTES
Patient declining IM bentyl at this time. Reports she does not want an injection, requesting something IV. RN offered PO tylenol for pain management. Patient agreeable at this time. Bladder full, marked ready for ultrasound.

## 2020-01-03 NOTE — ED NOTES
RN spoke with ultrasound and they stated it would be greater than 30 minutes. RN offered for patient to empty bladder slightly for comfort and we will get her to ultrasound as soon as possible. Patient ambulatory to bathroom.  She also reports her IV feels

## 2020-01-03 NOTE — ED NOTES
Pt awake and alert, appears comfortable. Pt states she has to leave by 1pm in order to  her children. Pt made aware of plan of care. Pt hesitant to receive bentyl due to it being an injection.  Pt requesting to be given via IV, pt informed that med c

## 2020-01-03 NOTE — ED NOTES
Pt states she has  A full bladder and would prefer a vaginal us.  US made aware, it is both vaginal and abdominal.

## 2020-01-03 NOTE — ED NOTES
Patient has called on call light multiple times yelling at staff demanding having her iv out and have the doctor come in. Multiple staff members have responded to pts room to address her needs.  Patient has continued to call on call light and come out to nu

## 2020-01-03 NOTE — ED NOTES
Patient on the call light and asking for help. Entered room and patient is verbally abusive to this RN. She is demanding that this RN remove her IV. Explained to patient that we may need it to give medications or perform tests.  Patient states that she unde

## 2020-01-03 NOTE — ED NOTES
Patient on call light again multiple times reporting tylenol isn't working. RN explained that PO medication takes a little longer to kick in. Patient requesting more medication through the IV to manage pain.  RN stated that MD has offered bentyl or tylenol

## 2020-01-03 NOTE — ED NOTES
Patient educated on discharge instructions and encouraged to follow up with her doctor for further management. Patient states she is still in pain and is demanding a prescription.  RN stated that the recommendation since she has an allergy to NSAIDS that we

## 2020-01-03 NOTE — ED PROVIDER NOTES
Patient Seen in: [facility] Emergency Department      History   Patient presents with:  Abdomen/Flank Pain    Stated Complaint: pt has ovarian cyst, abd pain    HPI    Patient is a 51-year-old female presents emergency room with a history of right-s Physical Exam  GENERAL: Well-developed, well-nourished female sitting up breathing easily in no apparent distress. Patient is nontoxic in appearance. HEENT: Head is normocephalic, atraumatic. Pupils are 3 mm equally round and reactive to light.  O limits   CBC WITH DIFFERENTIAL WITH PLATELET    Narrative: The following orders were created for panel order CBC WITH DIFFERENTIAL WITH PLATELET.   Procedure                               Abnormality         Status                     --------- [id number]  [phone]    Call in 2 days  [address] [facility]        Medications Prescribed:  Discharge Medication List as of 1/3/2020  1:13 PM

## 2020-01-07 ENCOUNTER — OFFICE VISIT (OUTPATIENT)
Dept: INTERNAL MEDICINE | Age: 35
End: 2020-01-07

## 2020-01-07 VITALS
HEIGHT: 63 IN | SYSTOLIC BLOOD PRESSURE: 102 MMHG | RESPIRATION RATE: 18 BRPM | HEART RATE: 104 BPM | TEMPERATURE: 98.7 F | WEIGHT: 106 LBS | DIASTOLIC BLOOD PRESSURE: 64 MMHG | BODY MASS INDEX: 18.78 KG/M2

## 2020-01-07 DIAGNOSIS — R10.13 EPIGASTRIC PAIN: ICD-10-CM

## 2020-01-07 DIAGNOSIS — G89.29 CHRONIC PAIN OF RIGHT HIP: Primary | ICD-10-CM

## 2020-01-07 DIAGNOSIS — M25.551 CHRONIC PAIN OF RIGHT HIP: Primary | ICD-10-CM

## 2020-01-07 PROCEDURE — 99203 OFFICE O/P NEW LOW 30 MIN: CPT | Performed by: INTERNAL MEDICINE

## 2020-01-07 RX ORDER — ALPRAZOLAM 0.25 MG/1
TABLET ORAL
COMMUNITY
Start: 2015-09-03 | End: 2020-01-21 | Stop reason: SDUPTHER

## 2020-01-07 RX ORDER — PANTOPRAZOLE SODIUM 40 MG/1
40 TABLET, DELAYED RELEASE ORAL DAILY
Qty: 30 TABLET | Refills: 1 | Status: SHIPPED | OUTPATIENT
Start: 2020-01-07 | End: 2020-02-13 | Stop reason: ALTCHOICE

## 2020-01-07 RX ORDER — HYDROCODONE BITARTRATE AND ACETAMINOPHEN 5; 325 MG/1; MG/1
1 TABLET ORAL
COMMUNITY
Start: 2019-12-22 | End: 2020-12-11

## 2020-01-07 RX ORDER — GABAPENTIN 100 MG/1
100 CAPSULE ORAL 2 TIMES DAILY
Qty: 60 CAPSULE | Refills: 1 | Status: SHIPPED | OUTPATIENT
Start: 2020-01-07

## 2020-01-07 SDOH — HEALTH STABILITY: MENTAL HEALTH: HOW OFTEN DO YOU HAVE A DRINK CONTAINING ALCOHOL?: NEVER

## 2020-01-07 ASSESSMENT — PATIENT HEALTH QUESTIONNAIRE - PHQ9
2. FEELING DOWN, DEPRESSED OR HOPELESS: NOT AT ALL
SUM OF ALL RESPONSES TO PHQ9 QUESTIONS 1 AND 2: 0
1. LITTLE INTEREST OR PLEASURE IN DOING THINGS: NOT AT ALL
SUM OF ALL RESPONSES TO PHQ9 QUESTIONS 1 AND 2: 0

## 2020-01-15 ENCOUNTER — HOSPITAL (OUTPATIENT)
Dept: OTHER | Age: 35
End: 2020-01-15

## 2020-01-17 ENCOUNTER — LAB SERVICES (OUTPATIENT)
Dept: LAB | Age: 35
End: 2020-01-17

## 2020-01-17 ENCOUNTER — OFFICE VISIT (OUTPATIENT)
Dept: PAIN MANAGEMENT | Age: 35
End: 2020-01-17

## 2020-01-17 ENCOUNTER — IMAGING SERVICES (OUTPATIENT)
Dept: GENERAL RADIOLOGY | Age: 35
End: 2020-01-17

## 2020-01-17 VITALS — BODY MASS INDEX: 18.78 KG/M2 | HEIGHT: 63 IN | WEIGHT: 106 LBS

## 2020-01-17 DIAGNOSIS — F41.9 ANXIETY: ICD-10-CM

## 2020-01-17 DIAGNOSIS — M25.859 FEMORAL ACETABULAR IMPINGEMENT: ICD-10-CM

## 2020-01-17 DIAGNOSIS — F17.200 SMOKER: ICD-10-CM

## 2020-01-17 DIAGNOSIS — Z79.891 LONG TERM CURRENT USE OF OPIATE ANALGESIC: ICD-10-CM

## 2020-01-17 DIAGNOSIS — M25.552 CHRONIC LEFT HIP PAIN: Primary | ICD-10-CM

## 2020-01-17 DIAGNOSIS — G89.29 CHRONIC LEFT HIP PAIN: Primary | ICD-10-CM

## 2020-01-17 PROCEDURE — 80349 CANNABINOIDS NATURAL: CPT | Performed by: PHYSICAL MEDICINE & REHABILITATION

## 2020-01-17 PROCEDURE — 80307 DRUG TEST PRSMV CHEM ANLYZR: CPT | Performed by: PHYSICAL MEDICINE & REHABILITATION

## 2020-01-17 PROCEDURE — 80361 OPIATES 1 OR MORE: CPT | Performed by: PHYSICAL MEDICINE & REHABILITATION

## 2020-01-17 PROCEDURE — 80365 DRUG SCREENING OXYCODONE: CPT | Performed by: PHYSICAL MEDICINE & REHABILITATION

## 2020-01-17 PROCEDURE — 99205 OFFICE O/P NEW HI 60 MIN: CPT | Performed by: PHYSICAL MEDICINE & REHABILITATION

## 2020-01-17 RX ORDER — TRAMADOL HYDROCHLORIDE 50 MG/1
50 TABLET ORAL 2 TIMES DAILY PRN
Qty: 60 TABLET | Refills: 0 | Status: SHIPPED | OUTPATIENT
Start: 2020-01-17 | End: 2020-02-13 | Stop reason: ALTCHOICE

## 2020-01-19 ENCOUNTER — E-ADVICE (OUTPATIENT)
Dept: PAIN MANAGEMENT | Age: 35
End: 2020-01-19

## 2020-01-20 ENCOUNTER — E-ADVICE (OUTPATIENT)
Dept: PAIN MANAGEMENT | Age: 35
End: 2020-01-20

## 2020-01-20 ENCOUNTER — E-ADVICE (OUTPATIENT)
Dept: INTERNAL MEDICINE | Age: 35
End: 2020-01-20

## 2020-01-21 ENCOUNTER — OFFICE VISIT (OUTPATIENT)
Dept: INTERNAL MEDICINE | Age: 35
End: 2020-01-21

## 2020-01-21 VITALS
TEMPERATURE: 99 F | OXYGEN SATURATION: 100 % | HEIGHT: 63 IN | DIASTOLIC BLOOD PRESSURE: 72 MMHG | BODY MASS INDEX: 20.2 KG/M2 | HEART RATE: 55 BPM | WEIGHT: 114 LBS | SYSTOLIC BLOOD PRESSURE: 120 MMHG

## 2020-01-21 DIAGNOSIS — Q80.9 ICHTHYOSIS: ICD-10-CM

## 2020-01-21 DIAGNOSIS — M25.473 ANKLE EDEMA: Primary | ICD-10-CM

## 2020-01-21 PROCEDURE — 99214 OFFICE O/P EST MOD 30 MIN: CPT | Performed by: INTERNAL MEDICINE

## 2020-01-21 RX ORDER — POTASSIUM CHLORIDE 750 MG/1
10 CAPSULE, EXTENDED RELEASE ORAL 2 TIMES DAILY
Qty: 10 CAPSULE | Refills: 0 | Status: SHIPPED | OUTPATIENT
Start: 2020-01-21 | End: 2020-01-21 | Stop reason: SDUPTHER

## 2020-01-21 RX ORDER — LORATADINE 10 MG/1
TABLET ORAL
Refills: 0 | COMMUNITY
Start: 2020-01-13 | End: 2020-02-13 | Stop reason: ALTCHOICE

## 2020-01-21 RX ORDER — CEPHALEXIN 500 MG/1
CAPSULE ORAL
Refills: 0 | COMMUNITY
Start: 2020-01-08 | End: 2020-02-13 | Stop reason: ALTCHOICE

## 2020-01-21 RX ORDER — POTASSIUM CHLORIDE 750 MG/1
10 CAPSULE, EXTENDED RELEASE ORAL DAILY
Qty: 10 CAPSULE | Refills: 0 | Status: SHIPPED | OUTPATIENT
Start: 2020-01-21

## 2020-01-21 RX ORDER — ALPRAZOLAM 0.5 MG/1
TABLET ORAL PRN
Refills: 1 | COMMUNITY
Start: 2020-01-07

## 2020-01-21 RX ORDER — LIDOCAINE 50 MG/G
PATCH TOPICAL
COMMUNITY
Start: 2019-10-24 | End: 2020-02-13 | Stop reason: ALTCHOICE

## 2020-01-21 RX ORDER — FAMOTIDINE 20 MG/1
TABLET, FILM COATED ORAL
COMMUNITY
Start: 2020-01-08

## 2020-01-21 RX ORDER — FUROSEMIDE 20 MG/1
20 TABLET ORAL DAILY
Qty: 10 TABLET | Refills: 0 | Status: SHIPPED | OUTPATIENT
Start: 2020-01-21

## 2020-01-21 RX ORDER — AMOXICILLIN 500 MG/1
CAPSULE ORAL
Refills: 0 | COMMUNITY
Start: 2020-01-16 | End: 2020-02-13 | Stop reason: ALTCHOICE

## 2020-01-21 RX ORDER — POLYETHYLENE GLYCOL 1000
POWDER (GRAM) MISCELLANEOUS
COMMUNITY
Start: 2020-01-08 | End: 2020-02-13 | Stop reason: ALTCHOICE

## 2020-01-21 RX ORDER — ACETAMINOPHEN 500 MG
TABLET ORAL
COMMUNITY
Start: 2019-12-28

## 2020-01-23 ENCOUNTER — E-ADVICE (OUTPATIENT)
Dept: PAIN MANAGEMENT | Age: 35
End: 2020-01-23

## 2020-01-24 ENCOUNTER — APPOINTMENT (OUTPATIENT)
Dept: PAIN MANAGEMENT | Age: 35
End: 2020-01-24

## 2020-01-24 ENCOUNTER — E-ADVICE (OUTPATIENT)
Dept: PAIN MANAGEMENT | Age: 35
End: 2020-01-24

## 2020-01-24 PROBLEM — G89.29 CHRONIC LEFT HIP PAIN: Status: ACTIVE | Noted: 2020-01-24

## 2020-01-24 PROBLEM — F17.200 SMOKER: Status: ACTIVE | Noted: 2020-01-24

## 2020-01-24 PROBLEM — M25.859 FEMORAL ACETABULAR IMPINGEMENT: Status: ACTIVE | Noted: 2020-01-24

## 2020-01-24 PROBLEM — M25.552 CHRONIC LEFT HIP PAIN: Status: ACTIVE | Noted: 2020-01-24

## 2020-01-24 PROBLEM — F41.9 ANXIETY: Status: ACTIVE | Noted: 2020-01-24

## 2020-01-24 PROBLEM — Z79.891 LONG TERM CURRENT USE OF OPIATE ANALGESIC: Status: ACTIVE | Noted: 2020-01-24

## 2020-01-24 LAB
CODEINE QUANTITATION: NORMAL NG/ML
CODEINE RESULT: POSITIVE
DRUG MONITORING REPORT (QDMRPT): NORMAL
HYDROCODONE RESULT: NEGATIVE
HYDROMORPHONE QUANT: 32 NG/ML
HYDROMORPHONE RESULT: POSITIVE
MARIJUANA METAB CONF: NEGATIVE
MARIJUANA METAB RES: NEGATIVE
MORPHINE QUANT: 5955 NG/ML
MORPHINE RESULT: POSITIVE
NORHYDROCODONE QUANTITATION (NHCDQT): 83 NG/ML
NORHYDROCODONE RESULT (NHCDRS): POSITIVE
NOROXYCODONE RESULT (NOXCRS): NEGATIVE
OPIATE CONFIRMATION: POSITIVE
OXYCODONE RESULT: NEGATIVE
OXYMORPHONE RESULT: NEGATIVE
SERVICE CMNT-IMP: NORMAL

## 2020-01-30 ENCOUNTER — E-ADVICE (OUTPATIENT)
Dept: PAIN MANAGEMENT | Age: 35
End: 2020-01-30

## 2020-01-31 ENCOUNTER — HOSPITAL ENCOUNTER (EMERGENCY)
Facility: HOSPITAL | Age: 35
Discharge: HOME | End: 2020-01-31
Attending: EMERGENCY MEDICINE
Payer: MEDICAID

## 2020-01-31 ENCOUNTER — APPOINTMENT (OUTPATIENT)
Dept: CT IMAGING | Facility: HOSPITAL | Age: 35
End: 2020-01-31
Attending: EMERGENCY MEDICINE
Payer: MEDICAID

## 2020-01-31 ENCOUNTER — APPOINTMENT (OUTPATIENT)
Dept: ULTRASOUND IMAGING | Facility: HOSPITAL | Age: 35
End: 2020-01-31
Attending: EMERGENCY MEDICINE
Payer: MEDICAID

## 2020-01-31 VITALS
SYSTOLIC BLOOD PRESSURE: 124 MMHG | TEMPERATURE: 99 F | DIASTOLIC BLOOD PRESSURE: 80 MMHG | RESPIRATION RATE: 18 BRPM | HEIGHT: 63 IN | HEART RATE: 58 BPM | WEIGHT: 120 LBS | OXYGEN SATURATION: 99 % | BODY MASS INDEX: 21.26 KG/M2

## 2020-01-31 DIAGNOSIS — R10.9 ABDOMINAL PAIN OF UNKNOWN ETIOLOGY: Primary | ICD-10-CM

## 2020-01-31 LAB
ALBUMIN SERPL-MCNC: 3.4 G/DL (ref 3.4–5)
ALBUMIN/GLOB SERPL: 0.8 {RATIO} (ref 1–2)
ALP LIVER SERPL-CCNC: 76 U/L (ref 37–98)
ALT SERPL-CCNC: 14 U/L (ref 13–56)
ANION GAP SERPL CALC-SCNC: 3 MMOL/L (ref 0–18)
AST SERPL-CCNC: 9 U/L (ref 15–37)
BASOPHILS # BLD AUTO: 0.02 X10(3) UL (ref 0–0.2)
BASOPHILS NFR BLD AUTO: 0.5 %
BILIRUB SERPL-MCNC: 0.2 MG/DL (ref 0.1–2)
BILIRUB UR QL STRIP.AUTO: NEGATIVE
BUN BLD-MCNC: 5 MG/DL (ref 7–18)
BUN/CREAT SERPL: 9.4 (ref 10–20)
CALCIUM BLD-MCNC: 9.1 MG/DL (ref 8.5–10.1)
CHLORIDE SERPL-SCNC: 112 MMOL/L (ref 98–112)
CLARITY UR REFRACT.AUTO: CLEAR
CO2 SERPL-SCNC: 27 MMOL/L (ref 21–32)
CREAT BLD-MCNC: 0.53 MG/DL (ref 0.55–1.02)
DEPRECATED RDW RBC AUTO: 45.5 FL (ref 35.1–46.3)
EOSINOPHIL # BLD AUTO: 0.02 X10(3) UL (ref 0–0.7)
EOSINOPHIL NFR BLD AUTO: 0.5 %
ERYTHROCYTE [DISTWIDTH] IN BLOOD BY AUTOMATED COUNT: 14 % (ref 11–15)
GLOBULIN PLAS-MCNC: 4.2 G/DL (ref 2.8–4.4)
GLUCOSE BLD-MCNC: 82 MG/DL (ref 70–99)
GLUCOSE UR STRIP.AUTO-MCNC: NEGATIVE MG/DL
HCT VFR BLD AUTO: 32.1 % (ref 35–48)
HGB BLD-MCNC: 10.1 G/DL (ref 12–16)
IMM GRANULOCYTES # BLD AUTO: 0.01 X10(3) UL (ref 0–1)
IMM GRANULOCYTES NFR BLD: 0.3 %
KETONES UR STRIP.AUTO-MCNC: NEGATIVE MG/DL
LEUKOCYTE ESTERASE UR QL STRIP.AUTO: NEGATIVE
LIPASE SERPL-CCNC: 194 U/L (ref 73–393)
LYMPHOCYTES # BLD AUTO: 1.22 X10(3) UL (ref 1–4)
LYMPHOCYTES NFR BLD AUTO: 30.7 %
M PROTEIN MFR SERPL ELPH: 7.6 G/DL (ref 6.4–8.2)
MCH RBC QN AUTO: 27.8 PG (ref 26–34)
MCHC RBC AUTO-ENTMCNC: 31.5 G/DL (ref 31–37)
MCV RBC AUTO: 88.4 FL (ref 80–100)
MONOCYTES # BLD AUTO: 0.3 X10(3) UL (ref 0.1–1)
MONOCYTES NFR BLD AUTO: 7.5 %
NEUTROPHILS # BLD AUTO: 2.41 X10 (3) UL (ref 1.5–7.7)
NEUTROPHILS # BLD AUTO: 2.41 X10(3) UL (ref 1.5–7.7)
NEUTROPHILS NFR BLD AUTO: 60.5 %
NITRITE UR QL STRIP.AUTO: NEGATIVE
OSMOLALITY SERPL CALC.SUM OF ELEC: 290 MOSM/KG (ref 275–295)
PH UR STRIP.AUTO: 6 [PH] (ref 4.5–8)
PLATELET # BLD AUTO: 348 10(3)UL (ref 150–450)
POCT LOT NUMBER: NORMAL
POCT URINE PREGNANCY: NEGATIVE
POTASSIUM SERPL-SCNC: 4.1 MMOL/L (ref 3.5–5.1)
PROT UR STRIP.AUTO-MCNC: NEGATIVE MG/DL
RBC # BLD AUTO: 3.63 X10(6)UL (ref 3.8–5.3)
SODIUM SERPL-SCNC: 142 MMOL/L (ref 136–145)
SP GR UR STRIP.AUTO: 1.01 (ref 1–1.03)
UROBILINOGEN UR STRIP.AUTO-MCNC: <2 MG/DL
WBC # BLD AUTO: 4 X10(3) UL (ref 4–11)

## 2020-01-31 PROCEDURE — 80053 COMPREHEN METABOLIC PANEL: CPT | Performed by: EMERGENCY MEDICINE

## 2020-01-31 PROCEDURE — 99284 EMERGENCY DEPT VISIT MOD MDM: CPT

## 2020-01-31 PROCEDURE — 83690 ASSAY OF LIPASE: CPT | Performed by: EMERGENCY MEDICINE

## 2020-01-31 PROCEDURE — 81025 URINE PREGNANCY TEST: CPT

## 2020-01-31 PROCEDURE — 81001 URINALYSIS AUTO W/SCOPE: CPT | Performed by: EMERGENCY MEDICINE

## 2020-01-31 PROCEDURE — 96374 THER/PROPH/DIAG INJ IV PUSH: CPT

## 2020-01-31 PROCEDURE — 85025 COMPLETE CBC W/AUTO DIFF WBC: CPT | Performed by: EMERGENCY MEDICINE

## 2020-01-31 PROCEDURE — 96361 HYDRATE IV INFUSION ADD-ON: CPT

## 2020-01-31 RX ORDER — KETOROLAC TROMETHAMINE 30 MG/ML
15 INJECTION, SOLUTION INTRAMUSCULAR; INTRAVENOUS ONCE
Status: COMPLETED | OUTPATIENT
Start: 2020-01-31 | End: 2020-01-31

## 2020-01-31 NOTE — ED NOTES
Late Entry:  Patient reports she doesn't feel good after Toradol, feels like it is giving her a cramp in her neck. No hives noted, lungs clear, no difficulty breathing. Patient thinks maybe she is allergic to Toradol, not Methadone like is in her chart.

## 2020-01-31 NOTE — ED NOTES
Pt agitated and complains about multi symptoms all at once, \"something messing with my neck\", \"I got to go piss\", \"I am having abd pain\".

## 2020-01-31 NOTE — ED NOTES
MD and RN at bedside speaking with patient. Patient reports she continues to be in a lot of pain. Tylenol was offered, patient declined. Patient reports she is leaving, asked RN to take out her IV.   IV removed, patient left from ER prior to receiving an

## 2020-01-31 NOTE — ED PROVIDER NOTES
Patient Seen in: [facility] Emergency Department      History   Patient presents with:  Pelvic Pain    Stated Complaint: Hx of ovarian cyst    HPI    26-year-old female presents emergency room for evaluation of abdominal pain.   Patient states he has kg   LMP 01/24/2020   SpO2 99%   BMI 21.26 kg/m²         Physical Exam    GENERAL: Patient is awake, alert, well-appearing, in no acute distress. HEENT:  no scleral icterus. Mucous membranes are moist, oropharynx is clear, uvula midline.     HEART: Regula Patient IV established, blood work obtained. Patient given IV fluid hydration, patient was noted to have allergy to ibuprofen which caused GI upset according to the patient. Patient has had Toradol in the past without anaphylactic type symptoms.   Kee Orellana

## 2020-02-03 ENCOUNTER — E-ADVICE (OUTPATIENT)
Dept: PAIN MANAGEMENT | Age: 35
End: 2020-02-03

## 2020-02-03 ENCOUNTER — TELEPHONE (OUTPATIENT)
Dept: OTHER | Facility: OTHER | Age: 35
End: 2020-02-03

## 2020-02-04 ENCOUNTER — E-ADVICE (OUTPATIENT)
Dept: PAIN MANAGEMENT | Age: 35
End: 2020-02-04

## 2020-02-04 ENCOUNTER — APPOINTMENT (OUTPATIENT)
Dept: PAIN MANAGEMENT | Age: 35
End: 2020-02-04

## 2020-02-04 ENCOUNTER — OFFICE VISIT (OUTPATIENT)
Dept: PAIN MANAGEMENT | Age: 35
End: 2020-02-04

## 2020-02-04 DIAGNOSIS — M25.552 CHRONIC LEFT HIP PAIN: Primary | ICD-10-CM

## 2020-02-04 DIAGNOSIS — F41.9 ANXIETY: ICD-10-CM

## 2020-02-04 DIAGNOSIS — G89.29 CHRONIC LEFT HIP PAIN: Primary | ICD-10-CM

## 2020-02-04 DIAGNOSIS — F17.200 SMOKER: ICD-10-CM

## 2020-02-04 DIAGNOSIS — Z91.148: ICD-10-CM

## 2020-02-04 DIAGNOSIS — M25.859 FEMORAL ACETABULAR IMPINGEMENT: ICD-10-CM

## 2020-02-04 PROCEDURE — 99214 OFFICE O/P EST MOD 30 MIN: CPT | Performed by: PHYSICAL MEDICINE & REHABILITATION

## 2020-02-13 ENCOUNTER — OFFICE VISIT (OUTPATIENT)
Dept: GASTROENTEROLOGY | Age: 35
End: 2020-02-13
Attending: INTERNAL MEDICINE

## 2020-02-13 ENCOUNTER — E-ADVICE (OUTPATIENT)
Dept: ORTHOPEDICS | Age: 35
End: 2020-02-13

## 2020-02-13 VITALS
WEIGHT: 107 LBS | DIASTOLIC BLOOD PRESSURE: 82 MMHG | BODY MASS INDEX: 18.96 KG/M2 | SYSTOLIC BLOOD PRESSURE: 106 MMHG | HEIGHT: 63 IN

## 2020-02-13 DIAGNOSIS — R10.13 EPIGASTRIC PAIN: ICD-10-CM

## 2020-02-13 DIAGNOSIS — G89.29 OTHER CHRONIC PAIN: ICD-10-CM

## 2020-02-13 DIAGNOSIS — R10.9 ABDOMINAL PAIN, UNSPECIFIED ABDOMINAL LOCATION: Primary | ICD-10-CM

## 2020-02-13 PROCEDURE — 99204 OFFICE O/P NEW MOD 45 MIN: CPT | Performed by: INTERNAL MEDICINE

## 2020-02-13 RX ORDER — OMEPRAZOLE 20 MG/1
20 CAPSULE, DELAYED RELEASE ORAL DAILY
Qty: 30 CAPSULE | Refills: 6 | Status: SHIPPED | OUTPATIENT
Start: 2020-02-13

## 2020-02-13 SDOH — HEALTH STABILITY: MENTAL HEALTH: HOW OFTEN DO YOU HAVE A DRINK CONTAINING ALCOHOL?: NEVER

## 2020-02-14 ENCOUNTER — APPOINTMENT (OUTPATIENT)
Dept: MRI IMAGING | Age: 35
End: 2020-02-14
Attending: EMERGENCY MEDICINE

## 2020-02-14 ENCOUNTER — E-ADVICE (OUTPATIENT)
Dept: ORTHOPEDICS | Age: 35
End: 2020-02-14

## 2020-02-14 ENCOUNTER — APPOINTMENT (OUTPATIENT)
Dept: GENERAL RADIOLOGY | Age: 35
End: 2020-02-14
Attending: EMERGENCY MEDICINE

## 2020-02-14 ENCOUNTER — HOSPITAL ENCOUNTER (EMERGENCY)
Age: 35
Discharge: HOME | End: 2020-02-14
Attending: EMERGENCY MEDICINE

## 2020-02-14 VITALS
SYSTOLIC BLOOD PRESSURE: 118 MMHG | BODY MASS INDEX: 18.89 KG/M2 | WEIGHT: 110.67 LBS | OXYGEN SATURATION: 100 % | HEART RATE: 89 BPM | DIASTOLIC BLOOD PRESSURE: 64 MMHG | HEIGHT: 64 IN | TEMPERATURE: 98.6 F | RESPIRATION RATE: 16 BRPM

## 2020-02-14 DIAGNOSIS — M54.42 ACUTE LEFT-SIDED LOW BACK PAIN WITH LEFT-SIDED SCIATICA: ICD-10-CM

## 2020-02-14 DIAGNOSIS — Z79.891 LONG TERM CURRENT USE OF OPIATE ANALGESIC: ICD-10-CM

## 2020-02-14 DIAGNOSIS — M25.552 HIP PAIN, BILATERAL: Primary | ICD-10-CM

## 2020-02-14 DIAGNOSIS — M25.551 HIP PAIN, BILATERAL: Primary | ICD-10-CM

## 2020-02-14 LAB
ANION GAP SERPL CALC-SCNC: 10 MMOL/L (ref 10–20)
B-HCG UR QL: NEGATIVE
BASOPHILS # BLD AUTO: 0.1 K/MCL (ref 0–0.3)
BASOPHILS NFR BLD AUTO: 1 %
BUN SERPL-MCNC: 14 MG/DL (ref 6–20)
BUN/CREAT SERPL: 27 (ref 7–25)
CALCIUM SERPL-MCNC: 8.9 MG/DL (ref 8.4–10.2)
CHLORIDE SERPL-SCNC: 110 MMOL/L (ref 98–107)
CO2 SERPL-SCNC: 24 MMOL/L (ref 21–32)
CREAT SERPL-MCNC: 0.51 MG/DL (ref 0.51–0.95)
DIFFERENTIAL METHOD BLD: ABNORMAL
EOSINOPHIL # BLD AUTO: 0.3 K/MCL (ref 0.1–0.5)
EOSINOPHIL NFR SPEC: 3 %
ERYTHROCYTE [DISTWIDTH] IN BLOOD: 13.8 % (ref 11–15)
GLUCOSE SERPL-MCNC: 92 MG/DL (ref 65–99)
HCG UR QL: NEGATIVE
HCT VFR BLD CALC: 30.1 % (ref 36–46.5)
HGB BLD-MCNC: 9.3 G/DL (ref 12–15.5)
IMM GRANULOCYTES # BLD AUTO: 0 K/MCL (ref 0–0.2)
IMM GRANULOCYTES NFR BLD: 0 %
LYMPHOCYTES # BLD MANUAL: 2.6 K/MCL (ref 1–4.8)
LYMPHOCYTES NFR BLD MANUAL: 29 %
MCH RBC QN AUTO: 27.4 PG (ref 26–34)
MCHC RBC AUTO-ENTMCNC: 30.9 G/DL (ref 32–36.5)
MCV RBC AUTO: 88.8 FL (ref 78–100)
MONOCYTES # BLD MANUAL: 0.6 K/MCL (ref 0.3–0.9)
MONOCYTES NFR BLD MANUAL: 7 %
NEUTROPHILS # BLD: 5.4 K/MCL (ref 1.8–7.7)
NEUTROPHILS NFR BLD AUTO: 60 %
NRBC BLD MANUAL-RTO: 0 /100 WBC
PLATELET # BLD: 362 K/MCL (ref 140–450)
POTASSIUM SERPL-SCNC: 3.9 MMOL/L (ref 3.4–5.1)
RBC # BLD: 3.39 MIL/MCL (ref 4–5.2)
SODIUM SERPL-SCNC: 140 MMOL/L (ref 135–145)
WBC # BLD: 8.9 K/MCL (ref 4.2–11)

## 2020-02-14 PROCEDURE — 73523 X-RAY EXAM HIPS BI 5/> VIEWS: CPT

## 2020-02-14 PROCEDURE — 99284 EMERGENCY DEPT VISIT MOD MDM: CPT

## 2020-02-14 PROCEDURE — 96361 HYDRATE IV INFUSION ADD-ON: CPT

## 2020-02-14 PROCEDURE — 10002800 HB RX 250 W HCPCS: Performed by: EMERGENCY MEDICINE

## 2020-02-14 PROCEDURE — 96374 THER/PROPH/DIAG INJ IV PUSH: CPT

## 2020-02-14 PROCEDURE — 10002807 HB RX 258: Performed by: EMERGENCY MEDICINE

## 2020-02-14 PROCEDURE — 80048 BASIC METABOLIC PNL TOTAL CA: CPT

## 2020-02-14 PROCEDURE — 85025 COMPLETE CBC W/AUTO DIFF WBC: CPT

## 2020-02-14 PROCEDURE — 84703 CHORIONIC GONADOTROPIN ASSAY: CPT

## 2020-02-14 PROCEDURE — 81025 URINE PREGNANCY TEST: CPT

## 2020-02-14 PROCEDURE — 72148 MRI LUMBAR SPINE W/O DYE: CPT

## 2020-02-14 RX ADMIN — MORPHINE SULFATE 4 MG: 4 INJECTION, SOLUTION INTRAMUSCULAR; INTRAVENOUS at 22:00

## 2020-02-14 RX ADMIN — SODIUM CHLORIDE 1000 ML: 0.9 INJECTION, SOLUTION INTRAVENOUS at 20:48

## 2020-02-14 RX ADMIN — MORPHINE SULFATE 4 MG: 4 INJECTION INTRAVENOUS at 20:47

## 2020-02-14 SDOH — HEALTH STABILITY: MENTAL HEALTH: HOW OFTEN DO YOU HAVE A DRINK CONTAINING ALCOHOL?: NEVER

## 2020-02-14 ASSESSMENT — PAIN SCALES - GENERAL
PAINLEVEL_OUTOF10: 10
PAINLEVEL_OUTOF10: 9
PAINLEVEL_OUTOF10: 10

## 2020-02-15 ASSESSMENT — ENCOUNTER SYMPTOMS
SORE THROAT: 0
WHEEZING: 0
FEVER: 0
DIZZINESS: 0
HALLUCINATIONS: 0
CONFUSION: 0
VOMITING: 0
HEADACHES: 0
NAUSEA: 0
DIARRHEA: 0
WEAKNESS: 1
BACK PAIN: 1
COUGH: 0
NUMBNESS: 1
SHORTNESS OF BREATH: 0
EYE PAIN: 0
CHILLS: 0
ABDOMINAL PAIN: 0

## 2020-02-17 ENCOUNTER — E-ADVICE (OUTPATIENT)
Dept: ORTHOPEDICS | Age: 35
End: 2020-02-17

## 2020-02-19 ENCOUNTER — HOSPITAL ENCOUNTER (EMERGENCY)
Age: 35
Discharge: LEFT BEFORE DISCHARGE | End: 2020-02-19
Attending: EMERGENCY MEDICINE

## 2020-02-19 ENCOUNTER — APPOINTMENT (OUTPATIENT)
Dept: ULTRASOUND IMAGING | Age: 35
End: 2020-02-19
Attending: EMERGENCY MEDICINE

## 2020-02-19 VITALS
BODY MASS INDEX: 19.53 KG/M2 | DIASTOLIC BLOOD PRESSURE: 84 MMHG | OXYGEN SATURATION: 100 % | SYSTOLIC BLOOD PRESSURE: 106 MMHG | WEIGHT: 110.23 LBS | TEMPERATURE: 97.3 F | HEART RATE: 84 BPM | HEIGHT: 63 IN | RESPIRATION RATE: 16 BRPM

## 2020-02-19 DIAGNOSIS — R10.30 LOWER ABDOMINAL PAIN: Primary | ICD-10-CM

## 2020-02-19 LAB
ALBUMIN SERPL-MCNC: 3.5 G/DL (ref 3.6–5.1)
ALBUMIN/GLOB SERPL: 0.8 {RATIO} (ref 1–2.4)
ALP SERPL-CCNC: 110 UNITS/L (ref 45–117)
ALT SERPL-CCNC: 26 UNITS/L
ANION GAP SERPL CALC-SCNC: 9 MMOL/L (ref 10–20)
APPEARANCE UR: CLEAR
AST SERPL-CCNC: 16 UNITS/L
BACTERIA #/AREA URNS HPF: ABNORMAL /HPF
BASOPHILS # BLD AUTO: 0 K/MCL (ref 0–0.3)
BASOPHILS NFR BLD AUTO: 1 %
BILIRUB SERPL-MCNC: <0.1 MG/DL (ref 0.2–1)
BILIRUB UR QL STRIP: NEGATIVE
BUN SERPL-MCNC: 11 MG/DL (ref 6–20)
BUN/CREAT SERPL: 20 (ref 7–25)
CALCIUM SERPL-MCNC: 8.9 MG/DL (ref 8.4–10.2)
CHLORIDE SERPL-SCNC: 109 MMOL/L (ref 98–107)
CO2 SERPL-SCNC: 26 MMOL/L (ref 21–32)
COLOR UR: YELLOW
CREAT SERPL-MCNC: 0.55 MG/DL (ref 0.51–0.95)
DIFFERENTIAL METHOD BLD: ABNORMAL
EOSINOPHIL # BLD AUTO: 0.2 K/MCL (ref 0.1–0.5)
EOSINOPHIL NFR SPEC: 2 %
ERYTHROCYTE [DISTWIDTH] IN BLOOD: 14 % (ref 11–15)
GLOBULIN SER-MCNC: 4.6 G/DL (ref 2–4)
GLUCOSE SERPL-MCNC: 77 MG/DL (ref 65–99)
GLUCOSE UR STRIP-MCNC: NEGATIVE MG/DL
HCG UR QL: NEGATIVE
HCT VFR BLD CALC: 32.7 % (ref 36–46.5)
HGB BLD-MCNC: 10 G/DL (ref 12–15.5)
HGB UR QL STRIP: ABNORMAL
HYALINE CASTS #/AREA URNS LPF: ABNORMAL /LPF (ref 0–5)
IMM GRANULOCYTES # BLD AUTO: 0 K/MCL (ref 0–0.2)
IMM GRANULOCYTES NFR BLD: 0 %
KETONES UR STRIP-MCNC: NEGATIVE MG/DL
LEUKOCYTE ESTERASE UR QL STRIP: NEGATIVE
LIPASE SERPL-CCNC: 158 UNITS/L (ref 73–393)
LYMPHOCYTES # BLD MANUAL: 2.8 K/MCL (ref 1–4.8)
LYMPHOCYTES NFR BLD MANUAL: 32 %
MCH RBC QN AUTO: 27 PG (ref 26–34)
MCHC RBC AUTO-ENTMCNC: 30.6 G/DL (ref 32–36.5)
MCV RBC AUTO: 88.1 FL (ref 78–100)
MONOCYTES # BLD MANUAL: 0.6 K/MCL (ref 0.3–0.9)
MONOCYTES NFR BLD MANUAL: 7 %
MUCOUS THREADS URNS QL MICRO: PRESENT
NEUTROPHILS # BLD: 5 K/MCL (ref 1.8–7.7)
NEUTROPHILS NFR BLD AUTO: 58 %
NITRITE UR QL STRIP: NEGATIVE
NRBC BLD MANUAL-RTO: 0 /100 WBC
PH UR STRIP: 6 UNITS (ref 5–7)
PLATELET # BLD: 407 K/MCL (ref 140–450)
POTASSIUM SERPL-SCNC: 3.8 MMOL/L (ref 3.4–5.1)
PROT SERPL-MCNC: 8.1 G/DL (ref 6.4–8.2)
PROT UR STRIP-MCNC: NEGATIVE MG/DL
RBC # BLD: 3.71 MIL/MCL (ref 4–5.2)
RBC #/AREA URNS HPF: >100 /HPF (ref 0–2)
SODIUM SERPL-SCNC: 140 MMOL/L (ref 135–145)
SP GR UR STRIP: 1.02 (ref 1–1.03)
SPECIMEN SOURCE: ABNORMAL
SQUAMOUS #/AREA URNS HPF: ABNORMAL /HPF (ref 0–5)
UROBILINOGEN UR STRIP-MCNC: 0.2 MG/DL (ref 0–1)
WBC # BLD: 8.6 K/MCL (ref 4.2–11)
WBC #/AREA URNS HPF: ABNORMAL /HPF (ref 0–5)

## 2020-02-19 PROCEDURE — 85025 COMPLETE CBC W/AUTO DIFF WBC: CPT

## 2020-02-19 PROCEDURE — 80053 COMPREHEN METABOLIC PANEL: CPT

## 2020-02-19 PROCEDURE — 10002800 HB RX 250 W HCPCS: Performed by: EMERGENCY MEDICINE

## 2020-02-19 PROCEDURE — 84703 CHORIONIC GONADOTROPIN ASSAY: CPT

## 2020-02-19 PROCEDURE — 83690 ASSAY OF LIPASE: CPT

## 2020-02-19 PROCEDURE — 94640 AIRWAY INHALATION TREATMENT: CPT

## 2020-02-19 PROCEDURE — 81001 URINALYSIS AUTO W/SCOPE: CPT

## 2020-02-19 PROCEDURE — 10002801 HB RX 250 W/O HCPCS: Performed by: EMERGENCY MEDICINE

## 2020-02-19 PROCEDURE — 99284 EMERGENCY DEPT VISIT MOD MDM: CPT

## 2020-02-19 PROCEDURE — 96374 THER/PROPH/DIAG INJ IV PUSH: CPT

## 2020-02-19 RX ORDER — ACETAMINOPHEN 500 MG
1000 TABLET ORAL ONCE
Status: DISCONTINUED | OUTPATIENT
Start: 2020-02-19 | End: 2020-02-19

## 2020-02-19 RX ORDER — IPRATROPIUM BROMIDE AND ALBUTEROL SULFATE 2.5; .5 MG/3ML; MG/3ML
3 SOLUTION RESPIRATORY (INHALATION) ONCE
Status: COMPLETED | OUTPATIENT
Start: 2020-02-19 | End: 2020-02-19

## 2020-02-19 RX ADMIN — KETOROLAC TROMETHAMINE 15 MG: 30 INJECTION, SOLUTION INTRAMUSCULAR at 14:32

## 2020-02-19 RX ADMIN — IPRATROPIUM BROMIDE AND ALBUTEROL SULFATE 3 ML: 2.5; .5 SOLUTION RESPIRATORY (INHALATION) at 15:13

## 2020-02-19 ASSESSMENT — ENCOUNTER SYMPTOMS
NAUSEA: 0
COUGH: 0
ADENOPATHY: 0
CHILLS: 0
DIZZINESS: 0
SHORTNESS OF BREATH: 0
VOMITING: 0
DIARRHEA: 0
BACK PAIN: 0
LIGHT-HEADEDNESS: 0
FEVER: 0
ABDOMINAL PAIN: 1
SORE THROAT: 0

## 2020-02-19 ASSESSMENT — PAIN DESCRIPTION - PAIN TYPE: TYPE: ACUTE PAIN

## 2020-02-19 ASSESSMENT — PAIN SCALES - GENERAL
PAINLEVEL_OUTOF10: 0
PAINLEVEL_OUTOF10: 10

## 2020-02-26 ENCOUNTER — HOSPITAL ENCOUNTER (EMERGENCY)
Age: 35
Discharge: HOME | End: 2020-02-26
Attending: EMERGENCY MEDICINE

## 2020-02-26 VITALS
TEMPERATURE: 98.6 F | OXYGEN SATURATION: 100 % | HEART RATE: 71 BPM | DIASTOLIC BLOOD PRESSURE: 82 MMHG | SYSTOLIC BLOOD PRESSURE: 121 MMHG | BODY MASS INDEX: 19.49 KG/M2 | WEIGHT: 110 LBS | HEIGHT: 63 IN | RESPIRATION RATE: 16 BRPM

## 2020-02-26 DIAGNOSIS — F41.0 ANXIETY ATTACK: Primary | ICD-10-CM

## 2020-02-26 PROCEDURE — 99282 EMERGENCY DEPT VISIT SF MDM: CPT

## 2020-02-26 SDOH — HEALTH STABILITY: MENTAL HEALTH: HOW OFTEN DO YOU HAVE A DRINK CONTAINING ALCOHOL?: NEVER

## 2020-02-26 ASSESSMENT — ENCOUNTER SYMPTOMS
CHEST TIGHTNESS: 0
ACTIVITY CHANGE: 0
CHILLS: 0
FEVER: 0
VOMITING: 0
SHORTNESS OF BREATH: 0
APPETITE CHANGE: 0
NUMBNESS: 0
CONFUSION: 0
WEAKNESS: 0
COUGH: 0

## 2020-02-26 ASSESSMENT — PAIN SCALES - GENERAL: PAINLEVEL_OUTOF10: 0

## 2020-03-19 ENCOUNTER — TELEPHONE (OUTPATIENT)
Dept: GASTROENTEROLOGY | Age: 35
End: 2020-03-19

## 2020-03-25 ENCOUNTER — TELEPHONE (OUTPATIENT)
Dept: ULTRASOUND IMAGING | Age: 35
End: 2020-03-25

## 2020-03-25 DIAGNOSIS — R10.11 RIGHT UPPER QUADRANT ABDOMINAL PAIN: Primary | ICD-10-CM

## 2020-04-27 ENCOUNTER — HOSPITAL (OUTPATIENT)
Dept: OTHER | Age: 35
End: 2020-04-27
Attending: PHYSICIAN ASSISTANT

## 2020-04-27 LAB
A/G RATIO_: 1.4
ABS LYMPH: 2 K/CUMM (ref 1–3.5)
ABS MONO: 0.3 K/CUMM (ref 0.1–0.8)
ABS NEUTRO: 2 K/CUMM (ref 2–8)
ALBUMIN: 4.8 G/DL (ref 3.5–5)
ALK PHOS: 60 UNIT/L (ref 50–124)
ALT/GPT: 10 UNIT/L (ref 0–55)
ANION GAP SERPL CALC-SCNC: 12 MEQ/L (ref 10–20)
AST/GOT: 15 UNIT/L (ref 5–34)
BASOPHIL: 1 % (ref 0–1)
BILI TOTAL: 0.3 MG/DL (ref 0.2–1)
BUN SERPL-MCNC: 5 MG/DL (ref 6–20)
CALCIUM: 10 MG/DL (ref 8.4–10.2)
CHLORIDE: 107 MEQ/L (ref 97–107)
CONTROL LINE: PRESENT
CREATININE: 0.72 MG/DL (ref 0.6–1.3)
DIFF_TYPE?: ABNORMAL
EOSINOPHIL: 1 % (ref 0–6)
GLOBULIN_: 3.5 G/DL (ref 2–4.1)
GLUCOSE LVL: 88 MG/DL (ref 70–99)
HCT VFR BLD CALC: 35 % (ref 33–45)
HEMOLYSIS 2+: NEGATIVE
HGB BLD-MCNC: 10.9 G/DL (ref 11–15)
ICTERIC 4+: NEGATIVE
IMMATURE GRAN: 0.2 % (ref 0–0.3)
INSTR WBC: 4.3 K/CUMM (ref 4–11)
LIPASE LEVEL: 28 UNIT/L (ref 8–78)
LIPEMIC 3+: NEGATIVE
LYMPHOCYTE: 45 %
Lab: CLEAR
MCH RBC QN AUTO: 26 PG (ref 25–35)
MCHC RBC AUTO-ENTMCNC: 31 G/DL (ref 32–37)
MCV RBC AUTO: 85 FL (ref 78–97)
MONOCYTE: 7 %
NEUTROPHIL: 46 %
NRBC BLD MANUAL-RTO: 0 % (ref 0–0.2)
PLATELET: 343 K/CUMM (ref 150–450)
POTASSIUM: 4.3 MEQ/L (ref 3.5–5.1)
RBC # BLD: 4.11 M/CUMM (ref 3.7–5.2)
RDW: 15.4 % (ref 11.5–14.5)
SODIUM: 138 MEQ/L (ref 136–145)
TCO2: 23 MEQ/L (ref 19–29)
TOTAL PROTEIN: 8.3 G/DL (ref 6.4–8.3)
UA APPEAR: CLEAR
UA BILI: NEGATIVE
UA BLOOD: NEGATIVE
UA COLOR: YELLOW
UA GLUCOSE: NEGATIVE
UA KETONES: NEGATIVE
UA LEUK EST: NEGATIVE
UA NITRITE: NEGATIVE
UA PH: 8 (ref 5–7)
UA PROTEIN: NEGATIVE
UA SPEC GRAV: 1.01 (ref 1.01–1.02)
UA UROBILINOGEN: 0.2 MG/DL (ref 0.2–1)
URINE PREG POC: NEGATIVE
WBC # BLD: 4.3 K/CUMM (ref 4–11)

## 2020-05-01 ENCOUNTER — HOSPITAL (OUTPATIENT)
Dept: OTHER | Age: 35
End: 2020-05-01
Attending: NURSE PRACTITIONER

## 2020-05-01 LAB
A/G RATIO_: 1.4
ABS LYMPH: 1.5 K/CUMM (ref 1–3.5)
ABS MONO: 0.4 K/CUMM (ref 0.1–0.8)
ABS NEUTRO: 1.9 K/CUMM (ref 2–8)
ALBUMIN: 4.2 G/DL (ref 3.5–5)
ALK PHOS: 63 UNIT/L (ref 50–124)
ALT/GPT: 13 UNIT/L (ref 0–55)
ANION GAP SERPL CALC-SCNC: 10 MEQ/L (ref 10–20)
AST/GOT: 15 UNIT/L (ref 5–34)
BASOPHIL: 1 % (ref 0–1)
BILI TOTAL: 0.2 MG/DL (ref 0.2–1)
BUN SERPL-MCNC: 10 MG/DL (ref 6–20)
CALCIUM: 8.8 MG/DL (ref 8.4–10.2)
CHLORIDE: 111 MEQ/L (ref 97–107)
CONTROL LINE: PRESENT
CREATININE: 0.69 MG/DL (ref 0.6–1.3)
DEVICE SN: NORMAL
DIFF_TYPE?: ABNORMAL
EOSINOPHIL: 3 % (ref 0–6)
GLOBULIN_: 3 G/DL (ref 2–4.1)
GLUCOSE LVL: 47 MG/DL (ref 70–99)
HCT VFR BLD CALC: 32 % (ref 33–45)
HEMOLYSIS 2+: NEGATIVE
HGB BLD-MCNC: 9.8 G/DL (ref 11–15)
ICTERIC 4+: NEGATIVE
IMMATURE GRAN: 0 % (ref 0–0.3)
INSTR WBC: 4.1 K/CUMM (ref 4–11)
LIPASE LEVEL: 96 UNIT/L (ref 8–78)
LIPEMIC 3+: NEGATIVE
LYMPHOCYTE: 38 %
Lab: CLEAR
MCH RBC QN AUTO: 27 PG (ref 25–35)
MCHC RBC AUTO-ENTMCNC: 31 G/DL (ref 32–37)
MCV RBC AUTO: 87 FL (ref 78–97)
MONOCYTE: 10 %
NEUTROPHIL: 48 %
NRBC BLD MANUAL-RTO: 0 % (ref 0–0.2)
PLATELET: 282 K/CUMM (ref 150–450)
POC_GLU: 77 MG/DL (ref 70–99)
POTASSIUM: 4.2 MEQ/L (ref 3.5–5.1)
RBC # BLD: 3.63 M/CUMM (ref 3.7–5.2)
RDW: 15.4 % (ref 11.5–14.5)
SODIUM: 140 MEQ/L (ref 136–145)
TCO2: 23 MEQ/L (ref 19–29)
TECH_ID: NORMAL
TOTAL PROTEIN: 7.2 G/DL (ref 6.4–8.3)
UA APPEAR: CLEAR
UA BILI: NEGATIVE
UA BLOOD: NEGATIVE
UA COLOR: YELLOW
UA GLUCOSE: NEGATIVE
UA KETONES: NEGATIVE
UA LEUK EST: NEGATIVE
UA NITRITE: NEGATIVE
UA PH: 6.5 (ref 5–7)
UA PROTEIN: NEGATIVE
UA SPEC GRAV: 1.01 (ref 1.01–1.02)
UA UROBILINOGEN: 0.2 MG/DL (ref 0.2–1)
URINE PREG POC: NEGATIVE
WBC # BLD: 4.1 K/CUMM (ref 4–11)

## 2020-05-05 ENCOUNTER — HOSPITAL (OUTPATIENT)
Dept: OTHER | Age: 35
End: 2020-05-05
Attending: PHYSICIAN ASSISTANT

## 2020-05-05 LAB
A/G RATIO_: 1.2
ABS LYMPH: 1 K/CUMM (ref 1–3.5)
ABS MONO: 0.3 K/CUMM (ref 0.1–0.8)
ABS NEUTRO: 2.6 K/CUMM (ref 2–8)
ALBUMIN: 4.2 G/DL (ref 3.5–5)
ALK PHOS: 73 UNIT/L (ref 50–124)
ALT/GPT: 11 UNIT/L (ref 0–55)
ANION GAP SERPL CALC-SCNC: 12 MEQ/L (ref 10–20)
AST/GOT: 13 UNIT/L (ref 5–34)
BASOPHIL: 0 % (ref 0–1)
BILI TOTAL: 0.1 MG/DL (ref 0.2–1)
BUN SERPL-MCNC: 13 MG/DL (ref 6–20)
CALCIUM: 9 MG/DL (ref 8.4–10.2)
CHLAMYDIA PROBE: NEGATIVE
CHLORIDE: 109 MEQ/L (ref 97–107)
CLUE CELLS: NORMAL
CONTROL LINE: PRESENT
CREATININE: 0.71 MG/DL (ref 0.6–1.3)
DIFF_TYPE?: ABNORMAL
EOSINOPHIL: 2 % (ref 0–6)
GC PROBE: NEGATIVE
GLOBULIN_: 3.4 G/DL (ref 2–4.1)
GLUCOSE LVL: 65 MG/DL (ref 70–99)
HCT VFR BLD CALC: 34 % (ref 33–45)
HEMOLYSIS 2+: NEGATIVE
HGB BLD-MCNC: 10.3 G/DL (ref 11–15)
ICTERIC 4+: NEGATIVE
IMMATURE GRAN: 0.3 % (ref 0–0.3)
INSTR WBC: 4 K/CUMM (ref 4–11)
LIPASE LEVEL: 81 UNIT/L (ref 8–78)
LIPEMIC 3+: NEGATIVE
LYMPHOCYTE: 24 %
Lab: CLEAR
MCH RBC QN AUTO: 26 PG (ref 25–35)
MCHC RBC AUTO-ENTMCNC: 31 G/DL (ref 32–37)
MCV RBC AUTO: 87 FL (ref 78–97)
MONOCYTE: 7 %
N GON SOURCE: NORMAL
NEUTROPHIL: 65 %
NRBC BLD MANUAL-RTO: 0 % (ref 0–0.2)
PLATELET: 297 K/CUMM (ref 150–450)
POTASSIUM: 4 MEQ/L (ref 3.5–5.1)
RBC # BLD: 3.88 M/CUMM (ref 3.7–5.2)
RDW: 15.4 % (ref 11.5–14.5)
SODIUM: 139 MEQ/L (ref 136–145)
TCO2: 22 MEQ/L (ref 19–29)
TOTAL PROTEIN: 7.6 G/DL (ref 6.4–8.3)
TRICHOMONAS: NORMAL
UA APPEAR: ABNORMAL
UA BACTERIA: ABNORMAL
UA BILI: NEGATIVE
UA BLOOD: ABNORMAL
UA COLOR: ABNORMAL
UA EPITHELIAL: ABNORMAL
UA GLUCOSE: NEGATIVE
UA KETONES: ABNORMAL
UA LEUK EST: ABNORMAL
UA NITRITE: POSITIVE
UA PH: 6.5 (ref 5–7)
UA PROTEIN: ABNORMAL
UA RBC: ABNORMAL
UA SPEC GRAV: 1.01 (ref 1.01–1.02)
UA UROBILINOGEN: 2 MG/DL (ref 0.2–1)
UA WBC: ABNORMAL
URINE PREG POC: NEGATIVE
WBC # BLD: 4 K/CUMM (ref 4–11)
WBC, WET PREP: NORMAL
YEAST, WET PREP: NORMAL

## 2020-05-08 ENCOUNTER — HOSPITAL (OUTPATIENT)
Dept: OTHER | Age: 35
End: 2020-05-08
Attending: PHYSICIAN ASSISTANT

## 2020-05-08 LAB
A/G RATIO_: 1.3
ABS LYMPH: 1.1 K/CUMM (ref 1–3.5)
ABS MONO: 0.3 K/CUMM (ref 0.1–0.8)
ABS NEUTRO: 2.3 K/CUMM (ref 2–8)
ALBUMIN: 4.3 G/DL (ref 3.5–5)
ALK PHOS: 67 UNIT/L (ref 50–124)
ALT/GPT: 15 UNIT/L (ref 0–55)
ANION GAP SERPL CALC-SCNC: 11 MEQ/L (ref 10–20)
AST/GOT: 16 UNIT/L (ref 5–34)
BASOPHIL: 1 % (ref 0–1)
BILI TOTAL: 0.2 MG/DL (ref 0.2–1)
BUN SERPL-MCNC: 8 MG/DL (ref 6–20)
CALCIUM: 9.2 MG/DL (ref 8.4–10.2)
CHLORIDE: 108 MEQ/L (ref 97–107)
CONTROL LINE: PRESENT
CREATININE: 0.65 MG/DL (ref 0.6–1.3)
DIFF_TYPE?: ABNORMAL
EOSINOPHIL: 3 % (ref 0–6)
GLOBULIN_: 3.4 G/DL (ref 2–4.1)
GLUCOSE LVL: 101 MG/DL (ref 70–99)
HCT VFR BLD CALC: 33 % (ref 33–45)
HEMOLYSIS 2+: NEGATIVE
HGB BLD-MCNC: 10.4 G/DL (ref 11–15)
ICTERIC 4+: NEGATIVE
IMMATURE GRAN: 0 % (ref 0–0.3)
INSTR WBC: 3.8 K/CUMM (ref 4–11)
LIPASE LEVEL: 63 UNIT/L (ref 8–78)
LIPEMIC 3+: NEGATIVE
LYMPHOCYTE: 29 %
Lab: CLEAR
MCH RBC QN AUTO: 27 PG (ref 25–35)
MCHC RBC AUTO-ENTMCNC: 31 G/DL (ref 32–37)
MCV RBC AUTO: 86 FL (ref 78–97)
MONOCYTE: 7 %
NEUTROPHIL: 60 %
NRBC BLD MANUAL-RTO: 0 % (ref 0–0.2)
PLATELET: 313 K/CUMM (ref 150–450)
POTASSIUM: 4.3 MEQ/L (ref 3.5–5.1)
RBC # BLD: 3.86 M/CUMM (ref 3.7–5.2)
RDW: 15.5 % (ref 11.5–14.5)
SODIUM: 139 MEQ/L (ref 136–145)
TCO2: 24 MEQ/L (ref 19–29)
TOTAL PROTEIN: 7.7 G/DL (ref 6.4–8.3)
UA APPEAR: CLEAR
UA BACTERIA: ABNORMAL
UA BILI: NEGATIVE
UA BLOOD: ABNORMAL
UA COLOR: YELLOW
UA EPITHELIAL: ABNORMAL
UA GLUCOSE: NEGATIVE
UA KETONES: NEGATIVE
UA LEUK EST: NEGATIVE
UA NITRITE: NEGATIVE
UA PH: 7 (ref 5–7)
UA PROTEIN: NEGATIVE
UA RBC: ABNORMAL
UA SPEC GRAV: 1.01 (ref 1.01–1.02)
UA UROBILINOGEN: 0.2 MG/DL (ref 0.2–1)
UA WBC: ABNORMAL
URINE PREG POC: NEGATIVE
WBC # BLD: 3.8 K/CUMM (ref 4–11)

## 2020-05-09 ENCOUNTER — OFFICE VISIT (OUTPATIENT)
Dept: INTERNAL MEDICINE | Age: 35
End: 2020-05-09

## 2020-05-09 ENCOUNTER — TELEPHONE (OUTPATIENT)
Dept: INTERNAL MEDICINE | Age: 35
End: 2020-05-09

## 2020-05-09 DIAGNOSIS — R14.0 FLATULENCE/GAS PAIN/BELCHING: Primary | ICD-10-CM

## 2020-05-09 PROCEDURE — 99214 OFFICE O/P EST MOD 30 MIN: CPT | Performed by: INTERNAL MEDICINE

## 2020-05-09 RX ORDER — DICYCLOMINE HYDROCHLORIDE 10 MG/1
10 CAPSULE ORAL
Qty: 30 CAPSULE | Refills: 0 | Status: SHIPPED | OUTPATIENT
Start: 2020-05-09

## 2020-05-19 ENCOUNTER — TELEPHONE (OUTPATIENT)
Dept: GASTROENTEROLOGY | Age: 35
End: 2020-05-19

## 2020-05-22 ENCOUNTER — TELEPHONE (OUTPATIENT)
Dept: GASTROENTEROLOGY | Age: 35
End: 2020-05-22

## 2020-05-26 ENCOUNTER — TELEPHONE (OUTPATIENT)
Dept: GASTROENTEROLOGY | Age: 35
End: 2020-05-26

## 2020-06-01 ENCOUNTER — HOSPITAL (OUTPATIENT)
Dept: OTHER | Age: 35
End: 2020-06-01
Attending: PHYSICIAN ASSISTANT

## 2020-06-01 LAB
A/G RATIO_: 1.2
A/G RATIO_: 1.2
ABS LYMPH: 2.4 K/CUMM (ref 1–3.5)
ABS LYMPH: 2.4 K/CUMM (ref 1–3.5)
ABS MONO: 0.4 K/CUMM (ref 0.1–0.8)
ABS MONO: 0.4 K/CUMM (ref 0.1–0.8)
ABS NEUTRO: 2.2 K/CUMM (ref 2–8)
ABS NEUTRO: 2.2 K/CUMM (ref 2–8)
ALBUMIN: 4 G/DL (ref 3.5–5)
ALBUMIN: 4 G/DL (ref 3.5–5)
ALK PHOS: 74 UNIT/L (ref 50–124)
ALK PHOS: 74 UNIT/L (ref 50–124)
ALT/GPT: 12 UNIT/L (ref 0–55)
ALT/GPT: 12 UNIT/L (ref 0–55)
ANION GAP SERPL CALC-SCNC: 13 MEQ/L (ref 10–20)
ANION GAP SERPL CALC-SCNC: 13 MEQ/L (ref 10–20)
AST/GOT: 16 UNIT/L (ref 5–34)
AST/GOT: 16 UNIT/L (ref 5–34)
BASOPHIL: 1 % (ref 0–1)
BASOPHIL: 1 % (ref 0–1)
BILI TOTAL: 0.3 MG/DL (ref 0.2–1)
BILI TOTAL: 0.3 MG/DL (ref 0.2–1)
BUN SERPL-MCNC: 7 MG/DL (ref 6–20)
BUN SERPL-MCNC: 7 MG/DL (ref 6–20)
CALCIUM: 9.4 MG/DL (ref 8.4–10.2)
CALCIUM: 9.4 MG/DL (ref 8.4–10.2)
CHLORIDE: 105 MEQ/L (ref 97–107)
CHLORIDE: 105 MEQ/L (ref 97–107)
CONTROL LINE: PRESENT
CREATININE: 0.69 MG/DL (ref 0.6–1.3)
CREATININE: 0.69 MG/DL (ref 0.6–1.3)
DIFF_TYPE?: ABNORMAL
EOSINOPHIL: 2 % (ref 0–6)
EOSINOPHIL: 2 % (ref 0–6)
GLOBULIN_: 3.4 G/DL (ref 2–4.1)
GLOBULIN_: 3.4 G/DL (ref 2–4.1)
GLUCOSE LVL: 88 MG/DL (ref 70–99)
GLUCOSE LVL: 88 MG/DL (ref 70–99)
HCT VFR BLD CALC: 30 % (ref 33–45)
HCT VFR BLD CALC: 30 % (ref 33–45)
HEMOLYSIS 2+: NEGATIVE
HGB BLD-MCNC: 9.4 G/DL (ref 11–15)
HGB BLD-MCNC: 9.4 G/DL (ref 11–15)
ICTERIC 4+: NEGATIVE
IMMATURE GRAN: 0.4 % (ref 0–0.3)
IMMATURE GRAN: 0.4 % (ref 0–0.3)
INSTR WBC: 5.2 K/CUMM (ref 4–11)
LIPASE LEVEL: 79 UNIT/L (ref 8–78)
LIPASE LEVEL: 79 UNIT/L (ref 8–78)
LIPEMIC 3+: NEGATIVE
LYMPHOCYTE: 46 %
LYMPHOCYTE: 46 %
Lab: CLEAR
MCH RBC QN AUTO: 27 PG (ref 25–35)
MCH RBC QN AUTO: 27 PG (ref 25–35)
MCHC RBC AUTO-ENTMCNC: 31 G/DL (ref 32–37)
MCHC RBC AUTO-ENTMCNC: 31 G/DL (ref 32–37)
MCV RBC AUTO: 86 FL (ref 78–97)
MCV RBC AUTO: 86 FL (ref 78–97)
MONOCYTE: 8 %
MONOCYTE: 8 %
NEUTROPHIL: 42 %
NEUTROPHIL: 42 %
NRBC BLD MANUAL-RTO: 0 % (ref 0–0.2)
NRBC BLD MANUAL-RTO: 0 % (ref 0–0.2)
PLATELET: 257 K/CUMM (ref 150–450)
PLATELET: 257 K/CUMM (ref 150–450)
POTASSIUM: 4.6 MEQ/L (ref 3.5–5.1)
POTASSIUM: 4.6 MEQ/L (ref 3.5–5.1)
RBC # BLD: 3.52 M/CUMM (ref 3.7–5.2)
RBC # BLD: 3.52 M/CUMM (ref 3.7–5.2)
RDW: 15.3 % (ref 11.5–14.5)
RDW: 15.3 % (ref 11.5–14.5)
SODIUM: 138 MEQ/L (ref 136–145)
SODIUM: 138 MEQ/L (ref 136–145)
TCO2: 25 MEQ/L (ref 19–29)
TCO2: 25 MEQ/L (ref 19–29)
TOTAL PROTEIN: 7.4 G/DL (ref 6.4–8.3)
TOTAL PROTEIN: 7.4 G/DL (ref 6.4–8.3)
UA APPEAR: CLEAR
UA APPEAR: CLEAR
UA BACTERIA: ABNORMAL
UA BACTERIA: ABNORMAL
UA BILI: NEGATIVE
UA BILI: NEGATIVE
UA BLOOD: ABNORMAL
UA BLOOD: ABNORMAL
UA COLOR: YELLOW
UA COLOR: YELLOW
UA EPITHELIAL: ABNORMAL
UA EPITHELIAL: ABNORMAL
UA GLUCOSE: NEGATIVE
UA GLUCOSE: NEGATIVE
UA KETONES: NEGATIVE
UA KETONES: NEGATIVE
UA LEUK EST: NEGATIVE
UA LEUK EST: NEGATIVE
UA NITRITE: NEGATIVE
UA NITRITE: NEGATIVE
UA PH: 7.5 (ref 5–7)
UA PH: 7.5 (ref 5–7)
UA PROTEIN: NEGATIVE
UA PROTEIN: NEGATIVE
UA RBC: ABNORMAL
UA RBC: ABNORMAL
UA SPEC GRAV: 1.01 (ref 1.01–1.02)
UA SPEC GRAV: 1.01 (ref 1.01–1.02)
UA UROBILINOGEN: 0.2 MG/DL (ref 0.2–1)
UA UROBILINOGEN: 0.2 MG/DL (ref 0.2–1)
UA WBC: ABNORMAL
UA WBC: ABNORMAL
URINE PREG POC: NEGATIVE
URINE PREG POC: NEGATIVE
WBC # BLD: 5.2 K/CUMM (ref 4–11)
WBC # BLD: 5.2 K/CUMM (ref 4–11)

## 2020-06-12 ENCOUNTER — HOSPITAL (OUTPATIENT)
Dept: OTHER | Age: 35
End: 2020-06-12
Attending: PHYSICIAN ASSISTANT

## 2020-06-12 LAB
A/G RATIO_: 1.1
A/G RATIO_: 1.1
ABS LYMPH: 1.5 K/CUMM (ref 1–3.5)
ABS LYMPH: 1.5 K/CUMM (ref 1–3.5)
ABS MONO: 0.2 K/CUMM (ref 0.1–0.8)
ABS MONO: 0.2 K/CUMM (ref 0.1–0.8)
ABS NEUTRO: 1.9 K/CUMM (ref 2–8)
ABS NEUTRO: 1.9 K/CUMM (ref 2–8)
ALBUMIN: 3.9 G/DL (ref 3.5–5)
ALBUMIN: 3.9 G/DL (ref 3.5–5)
ALK PHOS: 62 UNIT/L (ref 50–124)
ALK PHOS: 62 UNIT/L (ref 50–124)
ALT/GPT: 9 UNIT/L (ref 0–55)
ALT/GPT: 9 UNIT/L (ref 0–55)
ANION GAP SERPL CALC-SCNC: 13 MEQ/L (ref 10–20)
ANION GAP SERPL CALC-SCNC: 13 MEQ/L (ref 10–20)
AST/GOT: 15 UNIT/L (ref 5–34)
AST/GOT: 15 UNIT/L (ref 5–34)
BASOPHIL: 1 % (ref 0–1)
BASOPHIL: 1 % (ref 0–1)
BILI TOTAL: 0.2 MG/DL (ref 0.2–1)
BILI TOTAL: 0.2 MG/DL (ref 0.2–1)
BUN SERPL-MCNC: 10 MG/DL (ref 6–20)
BUN SERPL-MCNC: 10 MG/DL (ref 6–20)
CALCIUM: 9.2 MG/DL (ref 8.4–10.2)
CALCIUM: 9.2 MG/DL (ref 8.4–10.2)
CHLORIDE: 107 MEQ/L (ref 97–107)
CHLORIDE: 107 MEQ/L (ref 97–107)
CONTROL LINE: PRESENT
CREATININE: 0.68 MG/DL (ref 0.6–1.3)
CREATININE: 0.68 MG/DL (ref 0.6–1.3)
DIFF_TYPE?: ABNORMAL
EOSINOPHIL: 2 % (ref 0–6)
EOSINOPHIL: 2 % (ref 0–6)
GLOBULIN_: 3.4 G/DL (ref 2–4.1)
GLOBULIN_: 3.4 G/DL (ref 2–4.1)
GLUCOSE LVL: 107 MG/DL (ref 70–99)
GLUCOSE LVL: 107 MG/DL (ref 70–99)
HCT VFR BLD CALC: 31 % (ref 33–45)
HCT VFR BLD CALC: 31 % (ref 33–45)
HEMOLYSIS 2+: NEGATIVE
HGB BLD-MCNC: 9.6 G/DL (ref 11–15)
HGB BLD-MCNC: 9.6 G/DL (ref 11–15)
ICTERIC 4+: NEGATIVE
IMMATURE GRAN: 0.3 % (ref 0–0.3)
IMMATURE GRAN: 0.3 % (ref 0–0.3)
INSTR WBC: 3.8 K/CUMM (ref 4–11)
LIPASE LEVEL: 42 UNIT/L (ref 8–78)
LIPASE LEVEL: 42 UNIT/L (ref 8–78)
LIPEMIC 3+: NEGATIVE
LYMPHOCYTE: 40 %
LYMPHOCYTE: 40 %
Lab: CLEAR
MCH RBC QN AUTO: 27 PG (ref 25–35)
MCH RBC QN AUTO: 27 PG (ref 25–35)
MCHC RBC AUTO-ENTMCNC: 31 G/DL (ref 32–37)
MCHC RBC AUTO-ENTMCNC: 31 G/DL (ref 32–37)
MCV RBC AUTO: 88 FL (ref 78–97)
MCV RBC AUTO: 88 FL (ref 78–97)
MONOCYTE: 7 %
MONOCYTE: 7 %
NEUTROPHIL: 50 %
NEUTROPHIL: 50 %
NRBC BLD MANUAL-RTO: 0 % (ref 0–0.2)
NRBC BLD MANUAL-RTO: 0 % (ref 0–0.2)
PLATELET: 298 K/CUMM (ref 150–450)
PLATELET: 298 K/CUMM (ref 150–450)
POTASSIUM: 4 MEQ/L (ref 3.5–5.1)
POTASSIUM: 4 MEQ/L (ref 3.5–5.1)
RBC # BLD: 3.59 M/CUMM (ref 3.7–5.2)
RBC # BLD: 3.59 M/CUMM (ref 3.7–5.2)
RDW: 15.6 % (ref 11.5–14.5)
RDW: 15.6 % (ref 11.5–14.5)
SODIUM: 138 MEQ/L (ref 136–145)
SODIUM: 138 MEQ/L (ref 136–145)
TCO2: 22 MEQ/L (ref 19–29)
TCO2: 22 MEQ/L (ref 19–29)
TOTAL PROTEIN: 7.3 G/DL (ref 6.4–8.3)
TOTAL PROTEIN: 7.3 G/DL (ref 6.4–8.3)
UA APPEAR: CLEAR
UA APPEAR: CLEAR
UA BILI: NEGATIVE
UA BILI: NEGATIVE
UA BLOOD: NEGATIVE
UA BLOOD: NEGATIVE
UA COLOR: YELLOW
UA COLOR: YELLOW
UA GLUCOSE: NEGATIVE
UA GLUCOSE: NEGATIVE
UA KETONES: NEGATIVE
UA KETONES: NEGATIVE
UA LEUK EST: NEGATIVE
UA LEUK EST: NEGATIVE
UA NITRITE: NEGATIVE
UA NITRITE: NEGATIVE
UA PH: 7 (ref 5–7)
UA PH: 7 (ref 5–7)
UA PROTEIN: NEGATIVE
UA PROTEIN: NEGATIVE
UA SPEC GRAV: 1.02 (ref 1.01–1.02)
UA SPEC GRAV: 1.02 (ref 1.01–1.02)
UA UROBILINOGEN: 0.2 MG/DL (ref 0.2–1)
UA UROBILINOGEN: 0.2 MG/DL (ref 0.2–1)
URINE PREG POC: NEGATIVE
URINE PREG POC: NEGATIVE
WBC # BLD: 3.8 K/CUMM (ref 4–11)
WBC # BLD: 3.8 K/CUMM (ref 4–11)

## 2020-07-04 ENCOUNTER — HOSPITAL (OUTPATIENT)
Dept: OTHER | Age: 35
End: 2020-07-04
Attending: PHYSICIAN ASSISTANT

## 2020-07-04 LAB
A/G RATIO_: 1.2
A/G RATIO_: 1.2
ABS LYMPH: 2 K/CUMM (ref 1–3.5)
ABS LYMPH: 2 K/CUMM (ref 1–3.5)
ABS MONO: 0.3 K/CUMM (ref 0.1–0.8)
ABS MONO: 0.3 K/CUMM (ref 0.1–0.8)
ABS NEUTRO: 2.2 K/CUMM (ref 2–8)
ABS NEUTRO: 2.2 K/CUMM (ref 2–8)
ALBUMIN: 3.7 G/DL (ref 3.5–5)
ALBUMIN: 3.7 G/DL (ref 3.5–5)
ALK PHOS: 65 UNIT/L (ref 50–124)
ALK PHOS: 65 UNIT/L (ref 50–124)
ALT/GPT: 11 UNIT/L (ref 0–55)
ALT/GPT: 11 UNIT/L (ref 0–55)
ANION GAP SERPL CALC-SCNC: 14 MEQ/L (ref 10–20)
ANION GAP SERPL CALC-SCNC: 14 MEQ/L (ref 10–20)
AST/GOT: 16 UNIT/L (ref 5–34)
AST/GOT: 16 UNIT/L (ref 5–34)
BASOPHIL: 1 % (ref 0–1)
BASOPHIL: 1 % (ref 0–1)
BILI TOTAL: 0.2 MG/DL (ref 0.2–1)
BILI TOTAL: 0.2 MG/DL (ref 0.2–1)
BUN SERPL-MCNC: 10 MG/DL (ref 6–20)
BUN SERPL-MCNC: 10 MG/DL (ref 6–20)
CALCIUM: 8.4 MG/DL (ref 8.4–10.2)
CALCIUM: 8.4 MG/DL (ref 8.4–10.2)
CHLORIDE: 106 MEQ/L (ref 97–107)
CHLORIDE: 106 MEQ/L (ref 97–107)
CONTROL LINE: PRESENT
CREATININE: 0.69 MG/DL (ref 0.6–1.3)
CREATININE: 0.69 MG/DL (ref 0.6–1.3)
CRP INFLAMMATION: 0.2 MG/L (ref 0.1–8.2)
CRP INFLAMMATION: 0.2 MG/L (ref 0.1–8.2)
DIFF_TYPE?: ABNORMAL
EOSINOPHIL: 4 % (ref 0–6)
EOSINOPHIL: 4 % (ref 0–6)
GLOBULIN_: 3.2 G/DL (ref 2–4.1)
GLOBULIN_: 3.2 G/DL (ref 2–4.1)
GLUCOSE LVL: 134 MG/DL (ref 70–99)
GLUCOSE LVL: 134 MG/DL (ref 70–99)
HCT VFR BLD CALC: 31 % (ref 33–45)
HCT VFR BLD CALC: 31 % (ref 33–45)
HEMOLYSIS 2+: ABNORMAL
HGB BLD-MCNC: 9.7 G/DL (ref 11–15)
HGB BLD-MCNC: 9.7 G/DL (ref 11–15)
ICTERIC 4+: NEGATIVE
IMMATURE GRAN: 0 % (ref 0–0.3)
IMMATURE GRAN: 0 % (ref 0–0.3)
INSTR WBC: 4.8 K/CUMM (ref 4–11)
LIPASE LEVEL: 33 UNIT/L (ref 8–78)
LIPASE LEVEL: 33 UNIT/L (ref 8–78)
LIPEMIC 3+: NEGATIVE
LYMPHOCYTE: 43 %
LYMPHOCYTE: 43 %
Lab: CLEAR
MCH RBC QN AUTO: 28 PG (ref 25–35)
MCH RBC QN AUTO: 28 PG (ref 25–35)
MCHC RBC AUTO-ENTMCNC: 32 G/DL (ref 32–37)
MCHC RBC AUTO-ENTMCNC: 32 G/DL (ref 32–37)
MCV RBC AUTO: 87 FL (ref 78–97)
MCV RBC AUTO: 87 FL (ref 78–97)
MONOCYTE: 7 %
MONOCYTE: 7 %
NEUTROPHIL: 46 %
NEUTROPHIL: 46 %
NRBC BLD MANUAL-RTO: 0 % (ref 0–0.2)
NRBC BLD MANUAL-RTO: 0 % (ref 0–0.2)
PLATELET: 268 K/CUMM (ref 150–450)
PLATELET: 268 K/CUMM (ref 150–450)
POTASSIUM: 3.8 MEQ/L (ref 3.5–5.1)
POTASSIUM: 3.8 MEQ/L (ref 3.5–5.1)
RBC # BLD: 3.53 M/CUMM (ref 3.7–5.2)
RBC # BLD: 3.53 M/CUMM (ref 3.7–5.2)
RDW: 15.3 % (ref 11.5–14.5)
RDW: 15.3 % (ref 11.5–14.5)
SODIUM: 136 MEQ/L (ref 136–145)
SODIUM: 136 MEQ/L (ref 136–145)
TCO2: 20 MEQ/L (ref 19–29)
TCO2: 20 MEQ/L (ref 19–29)
TOTAL PROTEIN: 6.9 G/DL (ref 6.4–8.3)
TOTAL PROTEIN: 6.9 G/DL (ref 6.4–8.3)
UA APPEAR: CLEAR
UA APPEAR: CLEAR
UA BILI: NEGATIVE
UA BILI: NEGATIVE
UA BLOOD: NEGATIVE
UA BLOOD: NEGATIVE
UA COLOR: YELLOW
UA COLOR: YELLOW
UA GLUCOSE: NEGATIVE
UA GLUCOSE: NEGATIVE
UA KETONES: NEGATIVE
UA KETONES: NEGATIVE
UA LEUK EST: NEGATIVE
UA LEUK EST: NEGATIVE
UA NITRITE: NEGATIVE
UA NITRITE: NEGATIVE
UA PH: 5.5 (ref 5–7)
UA PH: 5.5 (ref 5–7)
UA PROTEIN: NEGATIVE
UA PROTEIN: NEGATIVE
UA SPEC GRAV: 1.02 (ref 1.01–1.02)
UA SPEC GRAV: 1.02 (ref 1.01–1.02)
UA UROBILINOGEN: 0.2 MG/DL (ref 0.2–1)
UA UROBILINOGEN: 0.2 MG/DL (ref 0.2–1)
URINE PREG POC: NEGATIVE
URINE PREG POC: NEGATIVE
WBC # BLD: 4.8 K/CUMM (ref 4–11)
WBC # BLD: 4.8 K/CUMM (ref 4–11)

## 2020-09-04 ENCOUNTER — HOSPITAL (OUTPATIENT)
Dept: OTHER | Age: 35
End: 2020-09-04
Attending: NURSE PRACTITIONER

## 2020-09-04 LAB
A/G RATIO_: 1.3
A/G RATIO_: 1.3
ABS LYMPH: 1.2 K/CUMM (ref 1–3.5)
ABS LYMPH: 1.2 K/CUMM (ref 1–3.5)
ABS MONO: 0.3 K/CUMM (ref 0.1–0.8)
ABS MONO: 0.3 K/CUMM (ref 0.1–0.8)
ABS NEUTRO: 2.4 K/CUMM (ref 2–8)
ABS NEUTRO: 2.4 K/CUMM (ref 2–8)
ALBUMIN: 4.4 G/DL (ref 3.5–5)
ALBUMIN: 4.4 G/DL (ref 3.5–5)
ALK PHOS: 64 UNIT/L (ref 50–124)
ALK PHOS: 64 UNIT/L (ref 50–124)
ALT/GPT: 9 UNIT/L (ref 0–55)
ALT/GPT: 9 UNIT/L (ref 0–55)
ANION GAP SERPL CALC-SCNC: 13 MEQ/L (ref 10–20)
ANION GAP SERPL CALC-SCNC: 13 MEQ/L (ref 10–20)
AST/GOT: 15 UNIT/L (ref 5–34)
AST/GOT: 15 UNIT/L (ref 5–34)
BASOPHIL: 0 % (ref 0–1)
BASOPHIL: 0 % (ref 0–1)
BILI TOTAL: 0.4 MG/DL (ref 0.2–1)
BILI TOTAL: 0.4 MG/DL (ref 0.2–1)
BUN SERPL-MCNC: 10 MG/DL (ref 6–20)
BUN SERPL-MCNC: 10 MG/DL (ref 6–20)
CALCIUM: 9.4 MG/DL (ref 8.4–10.2)
CALCIUM: 9.4 MG/DL (ref 8.4–10.2)
CHLORIDE: 105 MEQ/L (ref 97–107)
CHLORIDE: 105 MEQ/L (ref 97–107)
CONTROL LINE: PRESENT
CREATININE: 0.73 MG/DL (ref 0.6–1.3)
CREATININE: 0.73 MG/DL (ref 0.6–1.3)
DIFF_TYPE?: ABNORMAL
EOSINOPHIL: 0 % (ref 0–6)
EOSINOPHIL: 0 % (ref 0–6)
GLOBULIN_: 3.5 G/DL (ref 2–4.1)
GLOBULIN_: 3.5 G/DL (ref 2–4.1)
GLUCOSE LVL: 90 MG/DL (ref 70–99)
GLUCOSE LVL: 90 MG/DL (ref 70–99)
HCT VFR BLD CALC: 32 % (ref 33–45)
HCT VFR BLD CALC: 32 % (ref 33–45)
HEMOLYSIS 2+: NEGATIVE
HGB BLD-MCNC: 10.3 G/DL (ref 11–15)
HGB BLD-MCNC: 10.3 G/DL (ref 11–15)
ICTERIC 4+: NEGATIVE
IMMATURE GRAN: 0.3 % (ref 0–0.3)
IMMATURE GRAN: 0.3 % (ref 0–0.3)
INSTR WBC: 4 K/CUMM (ref 4–11)
LIPASE LEVEL: 14 UNIT/L (ref 8–78)
LIPASE LEVEL: 14 UNIT/L (ref 8–78)
LIPEMIC 3+: NEGATIVE
LYMPHOCYTE: 31 %
LYMPHOCYTE: 31 %
Lab: CLEAR
MCH RBC QN AUTO: 28 PG (ref 25–35)
MCH RBC QN AUTO: 28 PG (ref 25–35)
MCHC RBC AUTO-ENTMCNC: 32 G/DL (ref 32–37)
MCHC RBC AUTO-ENTMCNC: 32 G/DL (ref 32–37)
MCV RBC AUTO: 87 FL (ref 78–97)
MCV RBC AUTO: 87 FL (ref 78–97)
MONOCYTE: 7 %
MONOCYTE: 7 %
NEUTROPHIL: 61 %
NEUTROPHIL: 61 %
NRBC BLD MANUAL-RTO: 0 % (ref 0–0.2)
NRBC BLD MANUAL-RTO: 0 % (ref 0–0.2)
PLATELET: 286 K/CUMM (ref 150–450)
PLATELET: 286 K/CUMM (ref 150–450)
POTASSIUM: 4.1 MEQ/L (ref 3.5–5.1)
POTASSIUM: 4.1 MEQ/L (ref 3.5–5.1)
RBC # BLD: 3.73 M/CUMM (ref 3.7–5.2)
RBC # BLD: 3.73 M/CUMM (ref 3.7–5.2)
RDW: 14.9 % (ref 11.5–14.5)
RDW: 14.9 % (ref 11.5–14.5)
SODIUM: 136 MEQ/L (ref 136–145)
SODIUM: 136 MEQ/L (ref 136–145)
TCO2: 22 MEQ/L (ref 19–29)
TCO2: 22 MEQ/L (ref 19–29)
TOTAL PROTEIN: 7.9 G/DL (ref 6.4–8.3)
TOTAL PROTEIN: 7.9 G/DL (ref 6.4–8.3)
UA APPEAR: CLEAR
UA APPEAR: CLEAR
UA BILI: NEGATIVE
UA BILI: NEGATIVE
UA BLOOD: NEGATIVE
UA BLOOD: NEGATIVE
UA COLOR: YELLOW
UA COLOR: YELLOW
UA GLUCOSE: NEGATIVE
UA GLUCOSE: NEGATIVE
UA KETONES: ABNORMAL
UA KETONES: ABNORMAL
UA LEUK EST: NEGATIVE
UA LEUK EST: NEGATIVE
UA NITRITE: NEGATIVE
UA NITRITE: NEGATIVE
UA PH: 6 (ref 5–7)
UA PH: 6 (ref 5–7)
UA PROTEIN: NEGATIVE
UA PROTEIN: NEGATIVE
UA SPEC GRAV: 1.02 (ref 1.01–1.02)
UA SPEC GRAV: 1.02 (ref 1.01–1.02)
UA UROBILINOGEN: 0.2 MG/DL (ref 0.2–1)
UA UROBILINOGEN: 0.2 MG/DL (ref 0.2–1)
URINE PREG POC: NEGATIVE
URINE PREG POC: NEGATIVE
WBC # BLD: 4 K/CUMM (ref 4–11)
WBC # BLD: 4 K/CUMM (ref 4–11)

## 2020-09-06 ENCOUNTER — HOSPITAL (OUTPATIENT)
Dept: OTHER | Age: 35
End: 2020-09-06
Attending: NURSE PRACTITIONER

## 2020-09-17 ENCOUNTER — HOSPITAL (OUTPATIENT)
Dept: OTHER | Age: 35
End: 2020-09-17
Attending: PHYSICIAN ASSISTANT

## 2020-09-17 LAB
A/G RATIO_: 1.3
A/G RATIO_: 1.3
ABS LYMPH: 1.4 K/CUMM (ref 1–3.5)
ABS LYMPH: 1.4 K/CUMM (ref 1–3.5)
ABS MONO: 0.3 K/CUMM (ref 0.1–0.8)
ABS MONO: 0.3 K/CUMM (ref 0.1–0.8)
ABS NEUTRO: 1.3 K/CUMM (ref 2–8)
ABS NEUTRO: 1.3 K/CUMM (ref 2–8)
ALBUMIN: 4.2 G/DL (ref 3.5–5)
ALBUMIN: 4.2 G/DL (ref 3.5–5)
ALK PHOS: 61 UNIT/L (ref 50–124)
ALK PHOS: 61 UNIT/L (ref 50–124)
ALT/GPT: 8 UNIT/L (ref 0–55)
ALT/GPT: 8 UNIT/L (ref 0–55)
ANION GAP SERPL CALC-SCNC: 11 MEQ/L (ref 10–20)
ANION GAP SERPL CALC-SCNC: 11 MEQ/L (ref 10–20)
AST/GOT: 14 UNIT/L (ref 5–34)
AST/GOT: 14 UNIT/L (ref 5–34)
BASOPHIL: 1 % (ref 0–1)
BASOPHIL: 1 % (ref 0–1)
BILI TOTAL: 0.2 MG/DL (ref 0.2–1)
BILI TOTAL: 0.2 MG/DL (ref 0.2–1)
BUN SERPL-MCNC: 6 MG/DL (ref 6–20)
BUN SERPL-MCNC: 6 MG/DL (ref 6–20)
CALCIUM: 8.7 MG/DL (ref 8.4–10.2)
CALCIUM: 8.7 MG/DL (ref 8.4–10.2)
CHLAMYDIA PROBE: NEGATIVE
CHLORIDE: 109 MEQ/L (ref 97–107)
CHLORIDE: 109 MEQ/L (ref 97–107)
CLUE CELLS: NORMAL
CLUE CELLS: NORMAL
CONTROL LINE: PRESENT
CREATININE: 0.67 MG/DL (ref 0.6–1.3)
CREATININE: 0.67 MG/DL (ref 0.6–1.3)
DIFF_TYPE?: ABNORMAL
EOSINOPHIL: 2 % (ref 0–6)
EOSINOPHIL: 2 % (ref 0–6)
GC PROBE: NEGATIVE
GLOBULIN_: 3.2 G/DL (ref 2–4.1)
GLOBULIN_: 3.2 G/DL (ref 2–4.1)
GLUCOSE LVL: 77 MG/DL (ref 70–99)
GLUCOSE LVL: 77 MG/DL (ref 70–99)
HCT VFR BLD CALC: 32 % (ref 33–45)
HCT VFR BLD CALC: 32 % (ref 33–45)
HEMOLYSIS 2+: NEGATIVE
HGB BLD-MCNC: 10 G/DL (ref 11–15)
HGB BLD-MCNC: 10 G/DL (ref 11–15)
ICTERIC 4+: NEGATIVE
IMMATURE GRAN: 0 % (ref 0–0.3)
IMMATURE GRAN: 0 % (ref 0–0.3)
INSTR WBC: 3.1 K/CUMM (ref 4–11)
LIPASE LEVEL: 25 UNIT/L (ref 8–78)
LIPASE LEVEL: 25 UNIT/L (ref 8–78)
LIPEMIC 3+: NEGATIVE
LYMPHOCYTE: 46 %
LYMPHOCYTE: 46 %
Lab: CLEAR
MCH RBC QN AUTO: 27 PG (ref 25–35)
MCH RBC QN AUTO: 27 PG (ref 25–35)
MCHC RBC AUTO-ENTMCNC: 31 G/DL (ref 32–37)
MCHC RBC AUTO-ENTMCNC: 31 G/DL (ref 32–37)
MCV RBC AUTO: 87 FL (ref 78–97)
MCV RBC AUTO: 87 FL (ref 78–97)
MONOCYTE: 9 %
MONOCYTE: 9 %
N GON SOURCE: NORMAL
NEUTROPHIL: 42 %
NEUTROPHIL: 42 %
NRBC BLD MANUAL-RTO: 0 % (ref 0–0.2)
NRBC BLD MANUAL-RTO: 0 % (ref 0–0.2)
PLATELET: 304 K/CUMM (ref 150–450)
PLATELET: 304 K/CUMM (ref 150–450)
POTASSIUM: 4.2 MEQ/L (ref 3.5–5.1)
POTASSIUM: 4.2 MEQ/L (ref 3.5–5.1)
RBC # BLD: 3.65 M/CUMM (ref 3.7–5.2)
RBC # BLD: 3.65 M/CUMM (ref 3.7–5.2)
RDW: 15.3 % (ref 11.5–14.5)
RDW: 15.3 % (ref 11.5–14.5)
SODIUM: 140 MEQ/L (ref 136–145)
SODIUM: 140 MEQ/L (ref 136–145)
TCO2: 24 MEQ/L (ref 19–29)
TCO2: 24 MEQ/L (ref 19–29)
TOTAL PROTEIN: 7.4 G/DL (ref 6.4–8.3)
TOTAL PROTEIN: 7.4 G/DL (ref 6.4–8.3)
TRICHOMONAS: NORMAL
TRICHOMONAS: NORMAL
UA APPEAR: CLEAR
UA APPEAR: CLEAR
UA BACTERIA: ABNORMAL
UA BACTERIA: ABNORMAL
UA BILI: NEGATIVE
UA BILI: NEGATIVE
UA BLOOD: ABNORMAL
UA BLOOD: ABNORMAL
UA COLOR: YELLOW
UA COLOR: YELLOW
UA EPITHELIAL: ABNORMAL
UA EPITHELIAL: ABNORMAL
UA GLUCOSE: NEGATIVE
UA GLUCOSE: NEGATIVE
UA KETONES: NEGATIVE
UA KETONES: NEGATIVE
UA LEUK EST: NEGATIVE
UA LEUK EST: NEGATIVE
UA NITRITE: NEGATIVE
UA NITRITE: NEGATIVE
UA PH: 8 (ref 5–7)
UA PH: 8 (ref 5–7)
UA PROTEIN: NEGATIVE
UA PROTEIN: NEGATIVE
UA RBC: ABNORMAL
UA RBC: ABNORMAL
UA SPEC GRAV: 1.01 (ref 1.01–1.02)
UA SPEC GRAV: 1.01 (ref 1.01–1.02)
UA UROBILINOGEN: 0.2 MG/DL (ref 0.2–1)
UA UROBILINOGEN: 0.2 MG/DL (ref 0.2–1)
UA WBC: ABNORMAL
UA WBC: ABNORMAL
URINE PREG POC: NEGATIVE
URINE PREG POC: NEGATIVE
WBC # BLD: 3.1 K/CUMM (ref 4–11)
WBC # BLD: 3.1 K/CUMM (ref 4–11)
WBC, WET PREP: NORMAL
WBC, WET PREP: NORMAL
YEAST, WET PREP: NORMAL
YEAST, WET PREP: NORMAL

## 2020-09-18 LAB
CHLAMYDIA PROBE: NEGATIVE
GC PROBE: NEGATIVE
N GON SOURCE: NORMAL

## 2020-09-20 ENCOUNTER — HOSPITAL (OUTPATIENT)
Dept: OTHER | Age: 35
End: 2020-09-20
Attending: EMERGENCY MEDICINE

## 2020-09-20 LAB
A/G RATIO_: 1.3
A/G RATIO_: 1.3
ABS LYMPH: 1.5 K/CUMM (ref 1–3.5)
ABS LYMPH: 1.5 K/CUMM (ref 1–3.5)
ABS MONO: 0.2 K/CUMM (ref 0.1–0.8)
ABS MONO: 0.2 K/CUMM (ref 0.1–0.8)
ABS NEUTRO: 1.9 K/CUMM (ref 2–8)
ABS NEUTRO: 1.9 K/CUMM (ref 2–8)
ALBUMIN: 4.3 G/DL (ref 3.5–5)
ALBUMIN: 4.3 G/DL (ref 3.5–5)
ALK PHOS: 59 UNIT/L (ref 50–124)
ALK PHOS: 59 UNIT/L (ref 50–124)
ALT/GPT: 10 UNIT/L (ref 0–55)
ALT/GPT: 10 UNIT/L (ref 0–55)
ANION GAP SERPL CALC-SCNC: 12 MEQ/L (ref 10–20)
ANION GAP SERPL CALC-SCNC: 12 MEQ/L (ref 10–20)
AST/GOT: 13 UNIT/L (ref 5–34)
AST/GOT: 13 UNIT/L (ref 5–34)
BASOPHIL: 0 % (ref 0–1)
BASOPHIL: 0 % (ref 0–1)
BILI TOTAL: 0.5 MG/DL (ref 0.2–1)
BILI TOTAL: 0.5 MG/DL (ref 0.2–1)
BUN SERPL-MCNC: 8 MG/DL (ref 6–20)
BUN SERPL-MCNC: 8 MG/DL (ref 6–20)
CALCIUM: 9.1 MG/DL (ref 8.4–10.2)
CALCIUM: 9.1 MG/DL (ref 8.4–10.2)
CHLORIDE: 105 MEQ/L (ref 97–107)
CHLORIDE: 105 MEQ/L (ref 97–107)
CONTROL LINE: PRESENT
CREATININE: 0.69 MG/DL (ref 0.6–1.3)
CREATININE: 0.69 MG/DL (ref 0.6–1.3)
DIFF_TYPE?: ABNORMAL
EOSINOPHIL: 1 % (ref 0–6)
EOSINOPHIL: 1 % (ref 0–6)
GLOBULIN_: 3.4 G/DL (ref 2–4.1)
GLOBULIN_: 3.4 G/DL (ref 2–4.1)
GLUCOSE LVL: 76 MG/DL (ref 70–99)
GLUCOSE LVL: 76 MG/DL (ref 70–99)
HCT VFR BLD CALC: 32 % (ref 33–45)
HCT VFR BLD CALC: 32 % (ref 33–45)
HEMOLYSIS 2+: NEGATIVE
HGB BLD-MCNC: 10.5 G/DL (ref 11–15)
HGB BLD-MCNC: 10.5 G/DL (ref 11–15)
ICTERIC 4+: NEGATIVE
IMMATURE GRAN: 0.3 % (ref 0–0.3)
IMMATURE GRAN: 0.3 % (ref 0–0.3)
INSTR WBC: 3.7 K/CUMM (ref 4–11)
LIPASE LEVEL: 24 UNIT/L (ref 8–78)
LIPASE LEVEL: 24 UNIT/L (ref 8–78)
LIPEMIC 3+: NEGATIVE
LYMPHOCYTE: 41 %
LYMPHOCYTE: 41 %
Lab: CLEAR
MCH RBC QN AUTO: 28 PG (ref 25–35)
MCH RBC QN AUTO: 28 PG (ref 25–35)
MCHC RBC AUTO-ENTMCNC: 32 G/DL (ref 32–37)
MCHC RBC AUTO-ENTMCNC: 32 G/DL (ref 32–37)
MCV RBC AUTO: 86 FL (ref 78–97)
MCV RBC AUTO: 86 FL (ref 78–97)
MONOCYTE: 6 %
MONOCYTE: 6 %
NEUTROPHIL: 51 %
NEUTROPHIL: 51 %
NRBC BLD MANUAL-RTO: 0 % (ref 0–0.2)
NRBC BLD MANUAL-RTO: 0 % (ref 0–0.2)
PLATELET: 310 K/CUMM (ref 150–450)
PLATELET: 310 K/CUMM (ref 150–450)
POTASSIUM: 3.7 MEQ/L (ref 3.5–5.1)
POTASSIUM: 3.7 MEQ/L (ref 3.5–5.1)
RBC # BLD: 3.76 M/CUMM (ref 3.7–5.2)
RBC # BLD: 3.76 M/CUMM (ref 3.7–5.2)
RDW: 15.1 % (ref 11.5–14.5)
RDW: 15.1 % (ref 11.5–14.5)
SODIUM: 136 MEQ/L (ref 136–145)
SODIUM: 136 MEQ/L (ref 136–145)
TCO2: 23 MEQ/L (ref 19–29)
TCO2: 23 MEQ/L (ref 19–29)
TOTAL PROTEIN: 7.7 G/DL (ref 6.4–8.3)
TOTAL PROTEIN: 7.7 G/DL (ref 6.4–8.3)
UA APPEAR: CLEAR
UA APPEAR: CLEAR
UA BACTERIA: ABNORMAL
UA BACTERIA: ABNORMAL
UA BILI: NEGATIVE
UA BILI: NEGATIVE
UA BLOOD: ABNORMAL
UA BLOOD: ABNORMAL
UA COLOR: YELLOW
UA COLOR: YELLOW
UA EPITHELIAL: ABNORMAL
UA EPITHELIAL: ABNORMAL
UA GLUCOSE: NEGATIVE
UA GLUCOSE: NEGATIVE
UA KETONES: ABNORMAL
UA KETONES: ABNORMAL
UA LEUK EST: NEGATIVE
UA LEUK EST: NEGATIVE
UA NITRITE: NEGATIVE
UA NITRITE: NEGATIVE
UA PH: 7 (ref 5–7)
UA PH: 7 (ref 5–7)
UA PROTEIN: NEGATIVE
UA PROTEIN: NEGATIVE
UA RBC: ABNORMAL
UA RBC: ABNORMAL
UA SPEC GRAV: 1.01 (ref 1.01–1.02)
UA SPEC GRAV: 1.01 (ref 1.01–1.02)
UA UROBILINOGEN: 0.2 MG/DL (ref 0.2–1)
UA UROBILINOGEN: 0.2 MG/DL (ref 0.2–1)
UA WBC: ABNORMAL
UA WBC: ABNORMAL
URINE PREG POC: NEGATIVE
URINE PREG POC: NEGATIVE
WBC # BLD: 3.7 K/CUMM (ref 4–11)
WBC # BLD: 3.7 K/CUMM (ref 4–11)

## 2020-10-01 ENCOUNTER — HOSPITAL (OUTPATIENT)
Dept: OTHER | Age: 35
End: 2020-10-01
Attending: PHYSICIAN ASSISTANT

## 2020-10-01 LAB
A/G RATIO_: 1.3
A/G RATIO_: 1.3
ABS LYMPH: 2 K/CUMM (ref 1–3.5)
ABS LYMPH: 2 K/CUMM (ref 1–3.5)
ABS MONO: 0.4 K/CUMM (ref 0.1–0.8)
ABS MONO: 0.4 K/CUMM (ref 0.1–0.8)
ABS NEUTRO: 2.7 K/CUMM (ref 2–8)
ABS NEUTRO: 2.7 K/CUMM (ref 2–8)
ALBUMIN: 4.4 G/DL (ref 3.5–5)
ALBUMIN: 4.4 G/DL (ref 3.5–5)
ALK PHOS: 59 UNIT/L (ref 50–124)
ALK PHOS: 59 UNIT/L (ref 50–124)
ALT/GPT: 11 UNIT/L (ref 0–55)
ALT/GPT: 11 UNIT/L (ref 0–55)
ANION GAP SERPL CALC-SCNC: 13 MEQ/L (ref 10–20)
ANION GAP SERPL CALC-SCNC: 13 MEQ/L (ref 10–20)
AST/GOT: 14 UNIT/L (ref 5–34)
AST/GOT: 14 UNIT/L (ref 5–34)
BASOPHIL: 1 % (ref 0–1)
BASOPHIL: 1 % (ref 0–1)
BILI TOTAL: 0.2 MG/DL (ref 0.2–1)
BILI TOTAL: 0.2 MG/DL (ref 0.2–1)
BUN SERPL-MCNC: 12 MG/DL (ref 6–20)
BUN SERPL-MCNC: 12 MG/DL (ref 6–20)
CALCIUM: 9.1 MG/DL (ref 8.4–10.2)
CALCIUM: 9.1 MG/DL (ref 8.4–10.2)
CHLAMYDIA PROBE: NEGATIVE
CHLORIDE: 105 MEQ/L (ref 97–107)
CHLORIDE: 105 MEQ/L (ref 97–107)
CONTROL LINE: PRESENT
CREATININE: 0.71 MG/DL (ref 0.6–1.3)
CREATININE: 0.71 MG/DL (ref 0.6–1.3)
DIFF_TYPE?: ABNORMAL
EOSINOPHIL: 2 % (ref 0–6)
EOSINOPHIL: 2 % (ref 0–6)
GC PROBE: NEGATIVE
GLOBULIN_: 3.5 G/DL (ref 2–4.1)
GLOBULIN_: 3.5 G/DL (ref 2–4.1)
GLUCOSE LVL: 80 MG/DL (ref 70–99)
GLUCOSE LVL: 80 MG/DL (ref 70–99)
HCT VFR BLD CALC: 32 % (ref 33–45)
HCT VFR BLD CALC: 32 % (ref 33–45)
HEMOLYSIS 2+: NEGATIVE
HGB BLD-MCNC: 10.2 G/DL (ref 11–15)
HGB BLD-MCNC: 10.2 G/DL (ref 11–15)
ICTERIC 4+: NEGATIVE
IMMATURE GRAN: 0.2 % (ref 0–0.3)
IMMATURE GRAN: 0.2 % (ref 0–0.3)
INSTR WBC: 5.3 K/CUMM (ref 4–11)
LIPASE LEVEL: 34 UNIT/L (ref 8–78)
LIPASE LEVEL: 34 UNIT/L (ref 8–78)
LIPEMIC 3+: NEGATIVE
LYMPHOCYTE: 39 %
LYMPHOCYTE: 39 %
Lab: CLEAR
MCH RBC QN AUTO: 28 PG (ref 25–35)
MCH RBC QN AUTO: 28 PG (ref 25–35)
MCHC RBC AUTO-ENTMCNC: 32 G/DL (ref 32–37)
MCHC RBC AUTO-ENTMCNC: 32 G/DL (ref 32–37)
MCV RBC AUTO: 87 FL (ref 78–97)
MCV RBC AUTO: 87 FL (ref 78–97)
MONOCYTE: 8 %
MONOCYTE: 8 %
N GON SOURCE: NORMAL
NEUTROPHIL: 51 %
NEUTROPHIL: 51 %
NRBC BLD MANUAL-RTO: 0 % (ref 0–0.2)
NRBC BLD MANUAL-RTO: 0 % (ref 0–0.2)
PLATELET: 332 K/CUMM (ref 150–450)
PLATELET: 332 K/CUMM (ref 150–450)
POTASSIUM: 4.2 MEQ/L (ref 3.5–5.1)
POTASSIUM: 4.2 MEQ/L (ref 3.5–5.1)
RBC # BLD: 3.71 M/CUMM (ref 3.7–5.2)
RBC # BLD: 3.71 M/CUMM (ref 3.7–5.2)
RDW: 14.7 % (ref 11.5–14.5)
RDW: 14.7 % (ref 11.5–14.5)
SODIUM: 137 MEQ/L (ref 136–145)
SODIUM: 137 MEQ/L (ref 136–145)
TCO2: 23 MEQ/L (ref 19–29)
TCO2: 23 MEQ/L (ref 19–29)
TOTAL PROTEIN: 7.9 G/DL (ref 6.4–8.3)
TOTAL PROTEIN: 7.9 G/DL (ref 6.4–8.3)
UA APPEAR: CLEAR
UA APPEAR: CLEAR
UA BILI: NEGATIVE
UA BILI: NEGATIVE
UA BLOOD: NEGATIVE
UA BLOOD: NEGATIVE
UA COLOR: YELLOW
UA COLOR: YELLOW
UA GLUCOSE: NEGATIVE
UA GLUCOSE: NEGATIVE
UA KETONES: NEGATIVE
UA KETONES: NEGATIVE
UA LEUK EST: NEGATIVE
UA LEUK EST: NEGATIVE
UA NITRITE: NEGATIVE
UA NITRITE: NEGATIVE
UA PH: 6 (ref 5–7)
UA PH: 6 (ref 5–7)
UA PROTEIN: NEGATIVE
UA PROTEIN: NEGATIVE
UA SPEC GRAV: 1.02 (ref 1.01–1.02)
UA SPEC GRAV: 1.02 (ref 1.01–1.02)
UA UROBILINOGEN: 0.2 MG/DL (ref 0.2–1)
UA UROBILINOGEN: 0.2 MG/DL (ref 0.2–1)
URINE PREG POC: NEGATIVE
URINE PREG POC: NEGATIVE
WBC # BLD: 5.3 K/CUMM (ref 4–11)
WBC # BLD: 5.3 K/CUMM (ref 4–11)

## 2020-10-02 ENCOUNTER — HOSPITAL ENCOUNTER (EMERGENCY)
Age: 35
Discharge: HOME | End: 2020-10-02
Attending: EMERGENCY MEDICINE

## 2020-10-02 ENCOUNTER — APPOINTMENT (OUTPATIENT)
Dept: ULTRASOUND IMAGING | Age: 35
End: 2020-10-02
Attending: EMERGENCY MEDICINE

## 2020-10-02 VITALS
WEIGHT: 106.92 LBS | BODY MASS INDEX: 16.78 KG/M2 | SYSTOLIC BLOOD PRESSURE: 105 MMHG | HEART RATE: 65 BPM | TEMPERATURE: 98.2 F | RESPIRATION RATE: 15 BRPM | OXYGEN SATURATION: 98 % | DIASTOLIC BLOOD PRESSURE: 80 MMHG | HEIGHT: 67 IN

## 2020-10-02 DIAGNOSIS — R10.2 PELVIC PAIN IN FEMALE: Primary | ICD-10-CM

## 2020-10-02 LAB
ALBUMIN SERPL-MCNC: 4.1 G/DL (ref 3.6–5.1)
ALBUMIN/GLOB SERPL: 1.1 {RATIO} (ref 1–2.4)
ALP SERPL-CCNC: 56 UNITS/L (ref 45–117)
ALT SERPL-CCNC: 18 UNITS/L
ANION GAP SERPL CALC-SCNC: 12 MMOL/L (ref 10–20)
AST SERPL-CCNC: 14 UNITS/L
BASOPHILS # BLD: 0 K/MCL (ref 0–0.3)
BASOPHILS NFR BLD: 1 %
BILIRUB SERPL-MCNC: 0.3 MG/DL (ref 0.2–1)
BUN SERPL-MCNC: 12 MG/DL (ref 6–20)
BUN/CREAT SERPL: 19 (ref 7–25)
CALCIUM SERPL-MCNC: 8.8 MG/DL (ref 8.4–10.2)
CHLORIDE SERPL-SCNC: 108 MMOL/L (ref 98–107)
CLUE CELLS SPEC QL WET PREP: NORMAL
CO2 SERPL-SCNC: 25 MMOL/L (ref 21–32)
CREAT SERPL-MCNC: 0.64 MG/DL (ref 0.51–0.95)
DIFFERENTIAL METHOD BLD: ABNORMAL
EOSINOPHIL # BLD: 0.1 K/MCL (ref 0.1–0.5)
EOSINOPHIL NFR BLD: 1 %
ERYTHROCYTE [DISTWIDTH] IN BLOOD: 14.8 % (ref 11–15)
GLOBULIN SER-MCNC: 3.7 G/DL (ref 2–4)
GLUCOSE SERPL-MCNC: 75 MG/DL (ref 65–99)
HCG UR QL: NEGATIVE
HCT VFR BLD CALC: 32.4 % (ref 36–46.5)
HGB BLD-MCNC: 10.2 G/DL (ref 12–15.5)
IMM GRANULOCYTES # BLD AUTO: 0 K/MCL (ref 0–0.2)
IMM GRANULOCYTES NFR BLD: 0 %
LIPASE SERPL-CCNC: 147 UNITS/L (ref 73–393)
LYMPHOCYTES # BLD: 2 K/MCL (ref 1–4.8)
LYMPHOCYTES NFR BLD: 31 %
MCH RBC QN AUTO: 27.1 PG (ref 26–34)
MCHC RBC AUTO-ENTMCNC: 31.5 G/DL (ref 32–36.5)
MCV RBC AUTO: 86.2 FL (ref 78–100)
MONOCYTES # BLD: 0.4 K/MCL (ref 0.3–0.9)
MONOCYTES NFR BLD: 7 %
NEUTROPHILS # BLD: 3.8 K/MCL (ref 1.8–7.7)
NEUTROPHILS NFR BLD: 60 %
NRBC BLD MANUAL-RTO: 0 /100 WBC
PLATELET # BLD: 323 K/MCL (ref 140–450)
POTASSIUM SERPL-SCNC: 4.5 MMOL/L (ref 3.4–5.1)
PROT SERPL-MCNC: 7.8 G/DL (ref 6.4–8.2)
RBC # BLD: 3.76 MIL/MCL (ref 4–5.2)
SODIUM SERPL-SCNC: 140 MMOL/L (ref 135–145)
T VAGINALIS SPEC QL WET PREP: NORMAL
WBC # BLD: 6.3 K/MCL (ref 4.2–11)
YEAST SPEC QL WET PREP: NORMAL

## 2020-10-02 PROCEDURE — 10002800 HB RX 250 W HCPCS: Performed by: EMERGENCY MEDICINE

## 2020-10-02 PROCEDURE — 87491 CHLMYD TRACH DNA AMP PROBE: CPT

## 2020-10-02 PROCEDURE — 83690 ASSAY OF LIPASE: CPT

## 2020-10-02 PROCEDURE — 99284 EMERGENCY DEPT VISIT MOD MDM: CPT

## 2020-10-02 PROCEDURE — 85025 COMPLETE CBC W/AUTO DIFF WBC: CPT

## 2020-10-02 PROCEDURE — 76830 TRANSVAGINAL US NON-OB: CPT

## 2020-10-02 PROCEDURE — 80053 COMPREHEN METABOLIC PANEL: CPT

## 2020-10-02 PROCEDURE — 87210 SMEAR WET MOUNT SALINE/INK: CPT

## 2020-10-02 PROCEDURE — 84703 CHORIONIC GONADOTROPIN ASSAY: CPT

## 2020-10-02 PROCEDURE — 76856 US EXAM PELVIC COMPLETE: CPT

## 2020-10-02 PROCEDURE — 96374 THER/PROPH/DIAG INJ IV PUSH: CPT

## 2020-10-02 PROCEDURE — 10002803 HB RX 637: Performed by: EMERGENCY MEDICINE

## 2020-10-02 RX ORDER — HYDROCODONE BITARTRATE AND ACETAMINOPHEN 5; 325 MG/1; MG/1
1 TABLET ORAL ONCE
Status: COMPLETED | OUTPATIENT
Start: 2020-10-02 | End: 2020-10-02

## 2020-10-02 RX ADMIN — HYDROCODONE BITARTRATE AND ACETAMINOPHEN 1 TABLET: 5; 325 TABLET ORAL at 12:07

## 2020-10-02 RX ADMIN — MORPHINE SULFATE 1 MG: 2 INJECTION, SOLUTION INTRAMUSCULAR; INTRAVENOUS at 13:20

## 2020-10-02 ASSESSMENT — ENCOUNTER SYMPTOMS
BACK PAIN: 0
HEADACHES: 0
BRUISES/BLEEDS EASILY: 0
SHORTNESS OF BREATH: 0
FEVER: 0
CONFUSION: 0
ABDOMINAL PAIN: 0

## 2020-10-03 ENCOUNTER — HOSPITAL (OUTPATIENT)
Dept: OTHER | Age: 35
End: 2020-10-03
Attending: PHYSICIAN ASSISTANT

## 2020-10-03 LAB
ABS LYMPH: 1.2 K/CUMM (ref 1–3.5)
ABS LYMPH: 1.2 K/CUMM (ref 1–3.5)
ABS MONO: 0.3 K/CUMM (ref 0.1–0.8)
ABS MONO: 0.3 K/CUMM (ref 0.1–0.8)
ABS NEUTRO: 2.6 K/CUMM (ref 2–8)
ABS NEUTRO: 2.6 K/CUMM (ref 2–8)
ANION GAP SERPL CALC-SCNC: 12 MEQ/L (ref 10–20)
ANION GAP SERPL CALC-SCNC: 12 MEQ/L (ref 10–20)
BASOPHIL: 0 % (ref 0–1)
BASOPHIL: 0 % (ref 0–1)
BUN SERPL-MCNC: 12 MG/DL (ref 6–20)
BUN SERPL-MCNC: 12 MG/DL (ref 6–20)
C TRACH RRNA SPEC QL NAA+PROBE: NEGATIVE
CALCIUM: 8.9 MG/DL (ref 8.4–10.2)
CALCIUM: 8.9 MG/DL (ref 8.4–10.2)
CHLAMYDIA PROBE: NEGATIVE
CHLORIDE: 107 MEQ/L (ref 97–107)
CHLORIDE: 107 MEQ/L (ref 97–107)
CONTROL LINE: PRESENT
CREATININE: 0.68 MG/DL (ref 0.6–1.3)
CREATININE: 0.68 MG/DL (ref 0.6–1.3)
DIFF_TYPE?: ABNORMAL
EOSINOPHIL: 2 % (ref 0–6)
EOSINOPHIL: 2 % (ref 0–6)
GC PROBE: NEGATIVE
GLUCOSE LVL: 88 MG/DL (ref 70–99)
GLUCOSE LVL: 88 MG/DL (ref 70–99)
HCT VFR BLD CALC: 32 % (ref 33–45)
HCT VFR BLD CALC: 32 % (ref 33–45)
HEMOLYSIS 2+: NEGATIVE
HGB BLD-MCNC: 10 G/DL (ref 11–15)
HGB BLD-MCNC: 10 G/DL (ref 11–15)
IMMATURE GRAN: 0.9 % (ref 0–0.3)
IMMATURE GRAN: 0.9 % (ref 0–0.3)
INSTR WBC: 4.2 K/CUMM (ref 4–11)
LYMPHOCYTE: 28 %
LYMPHOCYTE: 28 %
Lab: CLEAR
MCH RBC QN AUTO: 28 PG (ref 25–35)
MCH RBC QN AUTO: 28 PG (ref 25–35)
MCHC RBC AUTO-ENTMCNC: 31 G/DL (ref 32–37)
MCHC RBC AUTO-ENTMCNC: 31 G/DL (ref 32–37)
MCV RBC AUTO: 87 FL (ref 78–97)
MCV RBC AUTO: 87 FL (ref 78–97)
MONOCYTE: 8 %
MONOCYTE: 8 %
N GON SOURCE: NORMAL
N GONORRHOEA RRNA SPEC QL NAA+PROBE: NEGATIVE
NEUTROPHIL: 61 %
NEUTROPHIL: 61 %
NRBC BLD MANUAL-RTO: 0 % (ref 0–0.2)
NRBC BLD MANUAL-RTO: 0 % (ref 0–0.2)
PLATELET: 304 K/CUMM (ref 150–450)
PLATELET: 304 K/CUMM (ref 150–450)
POTASSIUM: 4 MEQ/L (ref 3.5–5.1)
POTASSIUM: 4 MEQ/L (ref 3.5–5.1)
RBC # BLD: 3.64 M/CUMM (ref 3.7–5.2)
RBC # BLD: 3.64 M/CUMM (ref 3.7–5.2)
RDW: 14.8 % (ref 11.5–14.5)
RDW: 14.8 % (ref 11.5–14.5)
SODIUM: 137 MEQ/L (ref 136–145)
SODIUM: 137 MEQ/L (ref 136–145)
SPECIMEN SOURCE: NORMAL
TCO2: 22 MEQ/L (ref 19–29)
TCO2: 22 MEQ/L (ref 19–29)
UA APPEAR: CLEAR
UA APPEAR: CLEAR
UA BILI: NEGATIVE
UA BILI: NEGATIVE
UA BLOOD: NEGATIVE
UA BLOOD: NEGATIVE
UA COLOR: YELLOW
UA COLOR: YELLOW
UA GLUCOSE: NEGATIVE
UA GLUCOSE: NEGATIVE
UA KETONES: NEGATIVE
UA KETONES: NEGATIVE
UA LEUK EST: NEGATIVE
UA LEUK EST: NEGATIVE
UA NITRITE: NEGATIVE
UA NITRITE: NEGATIVE
UA PH: 6 (ref 5–7)
UA PH: 6 (ref 5–7)
UA PROTEIN: NEGATIVE
UA PROTEIN: NEGATIVE
UA SPEC GRAV: 1.02 (ref 1.01–1.02)
UA SPEC GRAV: 1.02 (ref 1.01–1.02)
UA UROBILINOGEN: 0.2 MG/DL (ref 0.2–1)
UA UROBILINOGEN: 0.2 MG/DL (ref 0.2–1)
URINE PREG POC: NEGATIVE
URINE PREG POC: NEGATIVE
WBC # BLD: 4.2 K/CUMM (ref 4–11)
WBC # BLD: 4.2 K/CUMM (ref 4–11)

## 2020-10-11 ENCOUNTER — HOSPITAL ENCOUNTER (EMERGENCY)
Facility: HOSPITAL | Age: 35
Discharge: HOME | End: 2020-10-11
Payer: MEDICAID

## 2020-10-11 VITALS
BODY MASS INDEX: 21.26 KG/M2 | HEIGHT: 63 IN | WEIGHT: 120 LBS | DIASTOLIC BLOOD PRESSURE: 81 MMHG | OXYGEN SATURATION: 99 % | TEMPERATURE: 98 F | SYSTOLIC BLOOD PRESSURE: 120 MMHG | HEART RATE: 78 BPM | RESPIRATION RATE: 16 BRPM

## 2020-10-11 DIAGNOSIS — G89.29 CHRONIC PELVIC PAIN IN FEMALE: Primary | ICD-10-CM

## 2020-10-11 DIAGNOSIS — Z87.42 HISTORY OF ENDOMETRIOSIS: ICD-10-CM

## 2020-10-11 DIAGNOSIS — R10.2 CHRONIC PELVIC PAIN IN FEMALE: Primary | ICD-10-CM

## 2020-10-11 PROCEDURE — 81001 URINALYSIS AUTO W/SCOPE: CPT

## 2020-10-11 PROCEDURE — 36415 COLL VENOUS BLD VENIPUNCTURE: CPT

## 2020-10-11 PROCEDURE — 99283 EMERGENCY DEPT VISIT LOW MDM: CPT

## 2020-10-11 PROCEDURE — 80048 BASIC METABOLIC PNL TOTAL CA: CPT

## 2020-10-11 PROCEDURE — 81025 URINE PREGNANCY TEST: CPT

## 2020-10-11 PROCEDURE — 85025 COMPLETE CBC W/AUTO DIFF WBC: CPT

## 2020-10-11 RX ORDER — MELATONIN
325
Qty: 20 TABLET | Refills: 0 | Status: SHIPPED | OUTPATIENT
Start: 2020-10-11

## 2020-10-11 RX ORDER — HYDROCODONE BITARTRATE AND ACETAMINOPHEN 5; 325 MG/1; MG/1
1 TABLET ORAL ONCE
Status: COMPLETED | OUTPATIENT
Start: 2020-10-11 | End: 2020-10-11

## 2020-10-11 RX ORDER — HYDROCODONE BITARTRATE AND ACETAMINOPHEN 5; 325 MG/1; MG/1
1-2 TABLET ORAL EVERY 6 HOURS PRN
Qty: 3 TABLET | Refills: 0 | Status: SHIPPED | OUTPATIENT
Start: 2020-10-11 | End: 2020-10-18

## 2020-10-11 NOTE — ED NOTES
Pt ambulatory to room 17, pt requesting pain medication from RN, also requesting three blankets. Pt changed into gown, pt also provided urine sample. Urine sample sent to lab by this RN. Pt requesting for pain medication again.  Pt resting comfortably in be

## 2020-10-11 NOTE — ED PROVIDER NOTES
Patient Seen in: [facility] AND [facility] Emergency Department      History   Patient presents with:  Abdomen/Flank Pain    Stated Complaint: abdominal pain     HPI    28-year-old female, with history of hip dysplasia, anxiety, chronic pelvic pain presents to t status: Current Some Day Smoker        Packs/day: 0.50        Types: Cigarettes      Smokeless tobacco: Never Used    Alcohol use: Not on file    Drug use: Not on file             Review of Systems    Positive for stated complaint: abdominal pain   Other s 47.4 (*)     All other components within normal limits   [facility] POCT PREGNANCY URINE - Normal   CBC WITH DIFFERENTIAL WITH PLATELET    Narrative: The following orders were created for panel order CBC WITH DIFFERENTIAL WITH PLATELET.   Procedure Impression:  Chronic pelvic pain in female  (primary encounter diagnosis)  History of endometriosis    Disposition:  There is no disposition on file for this visit. There is no disposition time on file for this visit.     Follow-up:  Viji King

## 2020-10-12 ENCOUNTER — HOSPITAL (OUTPATIENT)
Dept: OTHER | Age: 35
End: 2020-10-12
Attending: NURSE PRACTITIONER

## 2020-10-12 LAB
A/G RATIO_: 1.2
A/G RATIO_: 1.2
ABS LYMPH: 1.9 K/CUMM (ref 1–3.5)
ABS LYMPH: 1.9 K/CUMM (ref 1–3.5)
ABS MONO: 0.3 K/CUMM (ref 0.1–0.8)
ABS MONO: 0.3 K/CUMM (ref 0.1–0.8)
ABS NEUTRO: 3.2 K/CUMM (ref 2–8)
ABS NEUTRO: 3.2 K/CUMM (ref 2–8)
ALBUMIN: 4.2 G/DL (ref 3.5–5)
ALBUMIN: 4.2 G/DL (ref 3.5–5)
ALK PHOS: 60 UNIT/L (ref 50–124)
ALK PHOS: 60 UNIT/L (ref 50–124)
ALT/GPT: 10 UNIT/L (ref 0–55)
ALT/GPT: 10 UNIT/L (ref 0–55)
ANION GAP SERPL CALC-SCNC: 12 MEQ/L (ref 10–20)
ANION GAP SERPL CALC-SCNC: 12 MEQ/L (ref 10–20)
AST/GOT: 19 UNIT/L (ref 5–34)
AST/GOT: 19 UNIT/L (ref 5–34)
BASOPHIL: 1 % (ref 0–1)
BASOPHIL: 1 % (ref 0–1)
BILI TOTAL: 0.2 MG/DL (ref 0.2–1)
BILI TOTAL: 0.2 MG/DL (ref 0.2–1)
BUN SERPL-MCNC: 11 MG/DL (ref 6–20)
BUN SERPL-MCNC: 11 MG/DL (ref 6–20)
CALCIUM: 9 MG/DL (ref 8.4–10.2)
CALCIUM: 9 MG/DL (ref 8.4–10.2)
CHLORIDE: 106 MEQ/L (ref 97–107)
CHLORIDE: 106 MEQ/L (ref 97–107)
CONTROL LINE: PRESENT
CREATININE: 0.78 MG/DL (ref 0.6–1.3)
CREATININE: 0.78 MG/DL (ref 0.6–1.3)
DIFF_TYPE?: ABNORMAL
EOSINOPHIL: 2 % (ref 0–6)
EOSINOPHIL: 2 % (ref 0–6)
GLOBULIN_: 3.5 G/DL (ref 2–4.1)
GLOBULIN_: 3.5 G/DL (ref 2–4.1)
GLUCOSE LVL: 77 MG/DL (ref 70–99)
GLUCOSE LVL: 77 MG/DL (ref 70–99)
HCT VFR BLD CALC: 32 % (ref 33–45)
HCT VFR BLD CALC: 32 % (ref 33–45)
HEMOLYSIS 2+: NEGATIVE
HGB BLD-MCNC: 10 G/DL (ref 11–15)
HGB BLD-MCNC: 10 G/DL (ref 11–15)
ICTERIC 4+: NEGATIVE
IMMATURE GRAN: 0.2 % (ref 0–0.3)
IMMATURE GRAN: 0.2 % (ref 0–0.3)
INSTR WBC: 5.6 K/CUMM (ref 4–11)
LIPASE LEVEL: 42 UNIT/L (ref 8–78)
LIPASE LEVEL: 42 UNIT/L (ref 8–78)
LIPEMIC 3+: NEGATIVE
LYMPHOCYTE: 34 %
LYMPHOCYTE: 34 %
Lab: CLEAR
MCH RBC QN AUTO: 28 PG (ref 25–35)
MCH RBC QN AUTO: 28 PG (ref 25–35)
MCHC RBC AUTO-ENTMCNC: 31 G/DL (ref 32–37)
MCHC RBC AUTO-ENTMCNC: 31 G/DL (ref 32–37)
MCV RBC AUTO: 89 FL (ref 78–97)
MCV RBC AUTO: 89 FL (ref 78–97)
MONOCYTE: 6 %
MONOCYTE: 6 %
NEUTROPHIL: 57 %
NEUTROPHIL: 57 %
NRBC BLD MANUAL-RTO: 0 % (ref 0–0.2)
NRBC BLD MANUAL-RTO: 0 % (ref 0–0.2)
PLATELET: 330 K/CUMM (ref 150–450)
PLATELET: 330 K/CUMM (ref 150–450)
POTASSIUM: 3.8 MEQ/L (ref 3.5–5.1)
POTASSIUM: 3.8 MEQ/L (ref 3.5–5.1)
RBC # BLD: 3.59 M/CUMM (ref 3.7–5.2)
RBC # BLD: 3.59 M/CUMM (ref 3.7–5.2)
RDW: 15.4 % (ref 11.5–14.5)
RDW: 15.4 % (ref 11.5–14.5)
SODIUM: 137 MEQ/L (ref 136–145)
SODIUM: 137 MEQ/L (ref 136–145)
TCO2: 23 MEQ/L (ref 19–29)
TCO2: 23 MEQ/L (ref 19–29)
TOTAL PROTEIN: 7.7 G/DL (ref 6.4–8.3)
TOTAL PROTEIN: 7.7 G/DL (ref 6.4–8.3)
UA APPEAR: CLEAR
UA APPEAR: CLEAR
UA BACTERIA: ABNORMAL
UA BACTERIA: ABNORMAL
UA BILI: NEGATIVE
UA BILI: NEGATIVE
UA BLOOD: ABNORMAL
UA BLOOD: ABNORMAL
UA COLOR: YELLOW
UA COLOR: YELLOW
UA EPITHELIAL: ABNORMAL
UA EPITHELIAL: ABNORMAL
UA GLUCOSE: NEGATIVE
UA GLUCOSE: NEGATIVE
UA KETONES: NEGATIVE
UA KETONES: NEGATIVE
UA LEUK EST: NEGATIVE
UA LEUK EST: NEGATIVE
UA NITRITE: NEGATIVE
UA NITRITE: NEGATIVE
UA PH: 7 (ref 5–7)
UA PH: 7 (ref 5–7)
UA PROTEIN: NEGATIVE
UA PROTEIN: NEGATIVE
UA RBC: ABNORMAL
UA RBC: ABNORMAL
UA SPEC GRAV: 1.02 (ref 1.01–1.02)
UA SPEC GRAV: 1.02 (ref 1.01–1.02)
UA UROBILINOGEN: 0.2 MG/DL (ref 0.2–1)
UA UROBILINOGEN: 0.2 MG/DL (ref 0.2–1)
UA WBC: ABNORMAL
UA WBC: ABNORMAL
URINE PREG POC: NEGATIVE
URINE PREG POC: NEGATIVE
WBC # BLD: 5.6 K/CUMM (ref 4–11)
WBC # BLD: 5.6 K/CUMM (ref 4–11)

## 2020-10-16 ENCOUNTER — HOSPITAL ENCOUNTER (EMERGENCY)
Facility: HOSPITAL | Age: 35
Discharge: HOME | End: 2020-10-16
Attending: EMERGENCY MEDICINE
Payer: MEDICAID

## 2020-10-16 VITALS
OXYGEN SATURATION: 100 % | TEMPERATURE: 99 F | HEART RATE: 116 BPM | SYSTOLIC BLOOD PRESSURE: 116 MMHG | BODY MASS INDEX: 21 KG/M2 | WEIGHT: 120 LBS | RESPIRATION RATE: 22 BRPM | DIASTOLIC BLOOD PRESSURE: 64 MMHG

## 2020-10-16 DIAGNOSIS — R10.30 LOWER ABDOMINAL PAIN: Primary | ICD-10-CM

## 2020-10-16 PROCEDURE — 99283 EMERGENCY DEPT VISIT LOW MDM: CPT

## 2020-10-16 RX ORDER — KETOROLAC TROMETHAMINE 10 MG/1
10 TABLET, FILM COATED ORAL EVERY 6 HOURS PRN
Qty: 20 TABLET | Refills: 0 | Status: SHIPPED | OUTPATIENT
Start: 2020-10-16 | End: 2020-10-21

## 2020-10-16 RX ORDER — KETOROLAC TROMETHAMINE 30 MG/ML
30 INJECTION, SOLUTION INTRAMUSCULAR; INTRAVENOUS ONCE
Status: DISCONTINUED | OUTPATIENT
Start: 2020-10-16 | End: 2020-10-17

## 2020-10-16 RX ORDER — ACETAMINOPHEN 500 MG
1000 TABLET ORAL ONCE
Status: COMPLETED | OUTPATIENT
Start: 2020-10-16 | End: 2020-10-16

## 2020-10-17 ENCOUNTER — HOSPITAL (OUTPATIENT)
Dept: OTHER | Age: 35
End: 2020-10-17
Attending: EMERGENCY MEDICINE

## 2020-10-17 LAB
A/G RATIO_: 1.2
A/G RATIO_: 1.2
ABS LYMPH: 1.6 K/CUMM (ref 1–3.5)
ABS LYMPH: 1.6 K/CUMM (ref 1–3.5)
ABS MONO: 0.4 K/CUMM (ref 0.1–0.8)
ABS MONO: 0.4 K/CUMM (ref 0.1–0.8)
ABS NEUTRO: 3.5 K/CUMM (ref 2–8)
ABS NEUTRO: 3.5 K/CUMM (ref 2–8)
ALBUMIN: 3.9 G/DL (ref 3.5–5)
ALBUMIN: 3.9 G/DL (ref 3.5–5)
ALK PHOS: 60 UNIT/L (ref 50–124)
ALK PHOS: 60 UNIT/L (ref 50–124)
ALT/GPT: 11 UNIT/L (ref 0–55)
ALT/GPT: 11 UNIT/L (ref 0–55)
ANION GAP SERPL CALC-SCNC: 10 MEQ/L (ref 10–20)
ANION GAP SERPL CALC-SCNC: 10 MEQ/L (ref 10–20)
AST/GOT: 14 UNIT/L (ref 5–34)
AST/GOT: 14 UNIT/L (ref 5–34)
BASOPHIL: 0 % (ref 0–1)
BASOPHIL: 0 % (ref 0–1)
BILI TOTAL: 0.2 MG/DL (ref 0.2–1)
BILI TOTAL: 0.2 MG/DL (ref 0.2–1)
BUN SERPL-MCNC: 11 MG/DL (ref 6–20)
BUN SERPL-MCNC: 11 MG/DL (ref 6–20)
CALCIUM: 9.2 MG/DL (ref 8.4–10.2)
CALCIUM: 9.2 MG/DL (ref 8.4–10.2)
CHLORIDE: 109 MEQ/L (ref 97–107)
CHLORIDE: 109 MEQ/L (ref 97–107)
CONTROL LINE: PRESENT
CREATININE: 0.71 MG/DL (ref 0.6–1.3)
CREATININE: 0.71 MG/DL (ref 0.6–1.3)
DIFF_TYPE?: ABNORMAL
EOSINOPHIL: 0 % (ref 0–6)
EOSINOPHIL: 0 % (ref 0–6)
GLOBULIN_: 3.3 G/DL (ref 2–4.1)
GLOBULIN_: 3.3 G/DL (ref 2–4.1)
GLUCOSE LVL: 113 MG/DL (ref 70–99)
GLUCOSE LVL: 113 MG/DL (ref 70–99)
HCT VFR BLD CALC: 31 % (ref 33–45)
HCT VFR BLD CALC: 31 % (ref 33–45)
HEMOLYSIS 2+: NEGATIVE
HGB BLD-MCNC: 9.7 G/DL (ref 11–15)
HGB BLD-MCNC: 9.7 G/DL (ref 11–15)
IMMATURE GRAN: 0.2 % (ref 0–0.3)
IMMATURE GRAN: 0.2 % (ref 0–0.3)
INSTR WBC: 5.5 K/CUMM (ref 4–11)
LIPEMIC 3+: NEGATIVE
LYMPHOCYTE: 29 %
LYMPHOCYTE: 29 %
Lab: CLEAR
MCH RBC QN AUTO: 28 PG (ref 25–35)
MCH RBC QN AUTO: 28 PG (ref 25–35)
MCHC RBC AUTO-ENTMCNC: 31 G/DL (ref 32–37)
MCHC RBC AUTO-ENTMCNC: 31 G/DL (ref 32–37)
MCV RBC AUTO: 89 FL (ref 78–97)
MCV RBC AUTO: 89 FL (ref 78–97)
MONOCYTE: 6 %
MONOCYTE: 6 %
NEUTROPHIL: 64 %
NEUTROPHIL: 64 %
NRBC BLD MANUAL-RTO: 0 % (ref 0–0.2)
NRBC BLD MANUAL-RTO: 0 % (ref 0–0.2)
PLATELET: 365 K/CUMM (ref 150–450)
PLATELET: 365 K/CUMM (ref 150–450)
POTASSIUM: 4.6 MEQ/L (ref 3.5–5.1)
POTASSIUM: 4.6 MEQ/L (ref 3.5–5.1)
RBC # BLD: 3.51 M/CUMM (ref 3.7–5.2)
RBC # BLD: 3.51 M/CUMM (ref 3.7–5.2)
RDW: 15.3 % (ref 11.5–14.5)
RDW: 15.3 % (ref 11.5–14.5)
SODIUM: 140 MEQ/L (ref 136–145)
SODIUM: 140 MEQ/L (ref 136–145)
TCO2: 26 MEQ/L (ref 19–29)
TCO2: 26 MEQ/L (ref 19–29)
TOTAL PROTEIN: 7.2 G/DL (ref 6.4–8.3)
TOTAL PROTEIN: 7.2 G/DL (ref 6.4–8.3)
UA APPEAR: CLEAR
UA APPEAR: CLEAR
UA BILI: NEGATIVE
UA BILI: NEGATIVE
UA BLOOD: NEGATIVE
UA BLOOD: NEGATIVE
UA COLOR: YELLOW
UA COLOR: YELLOW
UA GLUCOSE: NEGATIVE
UA GLUCOSE: NEGATIVE
UA KETONES: NEGATIVE
UA KETONES: NEGATIVE
UA LEUK EST: NEGATIVE
UA LEUK EST: NEGATIVE
UA NITRITE: NEGATIVE
UA NITRITE: NEGATIVE
UA PH: 7 (ref 5–7)
UA PH: 7 (ref 5–7)
UA PROTEIN: NEGATIVE
UA PROTEIN: NEGATIVE
UA SPEC GRAV: 1.02 (ref 1.01–1.02)
UA SPEC GRAV: 1.02 (ref 1.01–1.02)
UA UROBILINOGEN: 0.2 MG/DL (ref 0.2–1)
UA UROBILINOGEN: 0.2 MG/DL (ref 0.2–1)
URINE HCG: NEGATIVE
URINE HCG: NEGATIVE
WBC # BLD: 5.5 K/CUMM (ref 4–11)
WBC # BLD: 5.5 K/CUMM (ref 4–11)

## 2020-10-17 NOTE — ED PROVIDER NOTES
Patient Seen in: [facility] AND [facility] Emergency Department    History   Patient presents with:  Abdomen/Flank Pain  Headache  Hip Pain      HPI    Patient presents to the ED complaining of bilateral lower abdominal pain and hip pain which she states is  patient was already wearing a droplet mask on my arrival to the room.  Personal protective equipment including droplet mask, eye protection, and gloves were worn throughout the duration of the exam.  Handwashing was performed prior to and after the exam.  S for any recent pertinent discharge summaries, testing, and procedures and reviewed those reports. Complicating Factors: The patient already has does not have any pertinent problems on file. to contribute to the complexity of this ED evaluation.     ED Co

## 2020-10-28 ENCOUNTER — HOSPITAL (OUTPATIENT)
Dept: OTHER | Age: 35
End: 2020-10-28
Attending: PHYSICIAN ASSISTANT

## 2020-11-01 ENCOUNTER — HOSPITAL (OUTPATIENT)
Dept: OTHER | Age: 35
End: 2020-11-01
Attending: PHYSICIAN ASSISTANT

## 2020-11-01 LAB
A/G RATIO_: 1.1
A/G RATIO_: 1.1
ABS LYMPH: 1.6 K/CUMM (ref 1–3.5)
ABS LYMPH: 1.6 K/CUMM (ref 1–3.5)
ABS MONO: 0.4 K/CUMM (ref 0.1–0.8)
ABS MONO: 0.4 K/CUMM (ref 0.1–0.8)
ABS NEUTRO: 2.3 K/CUMM (ref 2–8)
ABS NEUTRO: 2.3 K/CUMM (ref 2–8)
ALBUMIN: 4 G/DL (ref 3.5–5)
ALBUMIN: 4 G/DL (ref 3.5–5)
ALK PHOS: 64 UNIT/L (ref 50–124)
ALK PHOS: 64 UNIT/L (ref 50–124)
ALT/GPT: 13 UNIT/L (ref 0–55)
ALT/GPT: 13 UNIT/L (ref 0–55)
ANION GAP SERPL CALC-SCNC: 13 MEQ/L (ref 10–20)
ANION GAP SERPL CALC-SCNC: 13 MEQ/L (ref 10–20)
AST/GOT: 16 UNIT/L (ref 5–34)
AST/GOT: 16 UNIT/L (ref 5–34)
BASOPHIL: 1 % (ref 0–1)
BASOPHIL: 1 % (ref 0–1)
BILI TOTAL: 0.3 MG/DL (ref 0.2–1)
BILI TOTAL: 0.3 MG/DL (ref 0.2–1)
BUN SERPL-MCNC: 10 MG/DL (ref 6–20)
BUN SERPL-MCNC: 10 MG/DL (ref 6–20)
CALCIUM: 9.5 MG/DL (ref 8.4–10.2)
CALCIUM: 9.5 MG/DL (ref 8.4–10.2)
CHLORIDE: 107 MEQ/L (ref 97–107)
CHLORIDE: 107 MEQ/L (ref 97–107)
CONTROL LINE: PRESENT
CREATININE: 0.73 MG/DL (ref 0.6–1.3)
CREATININE: 0.73 MG/DL (ref 0.6–1.3)
DIFF_TYPE?: ABNORMAL
EOSINOPHIL: 3 % (ref 0–6)
EOSINOPHIL: 3 % (ref 0–6)
GLOBULIN_: 3.5 G/DL (ref 2–4.1)
GLOBULIN_: 3.5 G/DL (ref 2–4.1)
GLUCOSE LVL: 98 MG/DL (ref 70–99)
GLUCOSE LVL: 98 MG/DL (ref 70–99)
HCT VFR BLD CALC: 34 % (ref 33–45)
HCT VFR BLD CALC: 34 % (ref 33–45)
HEMOLYSIS 2+: NEGATIVE
HGB BLD-MCNC: 10.4 G/DL (ref 11–15)
HGB BLD-MCNC: 10.4 G/DL (ref 11–15)
ICTERIC 4+: NEGATIVE
IMMATURE GRAN: 0.2 % (ref 0–0.3)
IMMATURE GRAN: 0.2 % (ref 0–0.3)
INSTR WBC: 4.4 K/CUMM (ref 4–11)
LIPASE LEVEL: 45 UNIT/L (ref 8–78)
LIPASE LEVEL: 45 UNIT/L (ref 8–78)
LIPEMIC 3+: NEGATIVE
LYMPHOCYTE: 36 %
LYMPHOCYTE: 36 %
Lab: CLEAR
MCH RBC QN AUTO: 27 PG (ref 25–35)
MCH RBC QN AUTO: 27 PG (ref 25–35)
MCHC RBC AUTO-ENTMCNC: 31 G/DL (ref 32–37)
MCHC RBC AUTO-ENTMCNC: 31 G/DL (ref 32–37)
MCV RBC AUTO: 89 FL (ref 78–97)
MCV RBC AUTO: 89 FL (ref 78–97)
MONOCYTE: 8 %
MONOCYTE: 8 %
NEUTROPHIL: 52 %
NEUTROPHIL: 52 %
NRBC BLD MANUAL-RTO: 0 % (ref 0–0.2)
NRBC BLD MANUAL-RTO: 0 % (ref 0–0.2)
PLATELET: 291 K/CUMM (ref 150–450)
PLATELET: 291 K/CUMM (ref 150–450)
POTASSIUM: 4.6 MEQ/L (ref 3.5–5.1)
POTASSIUM: 4.6 MEQ/L (ref 3.5–5.1)
RBC # BLD: 3.82 M/CUMM (ref 3.7–5.2)
RBC # BLD: 3.82 M/CUMM (ref 3.7–5.2)
RDW: 15.3 % (ref 11.5–14.5)
RDW: 15.3 % (ref 11.5–14.5)
SODIUM: 139 MEQ/L (ref 136–145)
SODIUM: 139 MEQ/L (ref 136–145)
TCO2: 24 MEQ/L (ref 19–29)
TCO2: 24 MEQ/L (ref 19–29)
TOTAL PROTEIN: 7.5 G/DL (ref 6.4–8.3)
TOTAL PROTEIN: 7.5 G/DL (ref 6.4–8.3)
UA APPEAR: CLEAR
UA APPEAR: CLEAR
UA BILI: NEGATIVE
UA BILI: NEGATIVE
UA BLOOD: NEGATIVE
UA BLOOD: NEGATIVE
UA COLOR: YELLOW
UA COLOR: YELLOW
UA GLUCOSE: NEGATIVE
UA GLUCOSE: NEGATIVE
UA KETONES: NEGATIVE
UA KETONES: NEGATIVE
UA LEUK EST: NEGATIVE
UA LEUK EST: NEGATIVE
UA NITRITE: NEGATIVE
UA NITRITE: NEGATIVE
UA PH: 6 (ref 5–7)
UA PH: 6 (ref 5–7)
UA PROTEIN: NEGATIVE
UA PROTEIN: NEGATIVE
UA SPEC GRAV: 1.02 (ref 1.01–1.02)
UA SPEC GRAV: 1.02 (ref 1.01–1.02)
UA UROBILINOGEN: 0.2 MG/DL (ref 0.2–1)
UA UROBILINOGEN: 0.2 MG/DL (ref 0.2–1)
URINE PREG POC: NEGATIVE
URINE PREG POC: NEGATIVE
WBC # BLD: 4.4 K/CUMM (ref 4–11)
WBC # BLD: 4.4 K/CUMM (ref 4–11)

## 2020-11-07 ENCOUNTER — HOSPITAL (OUTPATIENT)
Dept: OTHER | Age: 35
End: 2020-11-07
Attending: NURSE PRACTITIONER

## 2020-11-07 LAB
A/G RATIO_: 1.2
A/G RATIO_: 1.2
ABS LYMPH: 2 K/CUMM (ref 1–3.5)
ABS LYMPH: 2 K/CUMM (ref 1–3.5)
ABS MONO: 0.4 K/CUMM (ref 0.1–0.8)
ABS MONO: 0.4 K/CUMM (ref 0.1–0.8)
ABS NEUTRO: 2.5 K/CUMM (ref 2–8)
ABS NEUTRO: 2.5 K/CUMM (ref 2–8)
ALBUMIN: 3.8 G/DL (ref 3.5–5)
ALBUMIN: 3.8 G/DL (ref 3.5–5)
ALK PHOS: 65 UNIT/L (ref 50–124)
ALK PHOS: 65 UNIT/L (ref 50–124)
ALT/GPT: 11 UNIT/L (ref 0–55)
ALT/GPT: 11 UNIT/L (ref 0–55)
ANION GAP SERPL CALC-SCNC: 13 MEQ/L (ref 10–20)
ANION GAP SERPL CALC-SCNC: 13 MEQ/L (ref 10–20)
AST/GOT: 14 UNIT/L (ref 5–34)
AST/GOT: 14 UNIT/L (ref 5–34)
BASOPHIL: 1 % (ref 0–1)
BASOPHIL: 1 % (ref 0–1)
BILI TOTAL: 0.2 MG/DL (ref 0.2–1)
BILI TOTAL: 0.2 MG/DL (ref 0.2–1)
BUN SERPL-MCNC: 10 MG/DL (ref 6–20)
BUN SERPL-MCNC: 10 MG/DL (ref 6–20)
CALCIUM: 8.8 MG/DL (ref 8.4–10.2)
CALCIUM: 8.8 MG/DL (ref 8.4–10.2)
CHLORIDE: 109 MEQ/L (ref 97–107)
CHLORIDE: 109 MEQ/L (ref 97–107)
CONTROL LINE: PRESENT
CREATININE: 0.72 MG/DL (ref 0.6–1.3)
CREATININE: 0.72 MG/DL (ref 0.6–1.3)
DIFF_TYPE?: ABNORMAL
EOSINOPHIL: 3 % (ref 0–6)
EOSINOPHIL: 3 % (ref 0–6)
GLOBULIN_: 3.1 G/DL (ref 2–4.1)
GLOBULIN_: 3.1 G/DL (ref 2–4.1)
GLUCOSE LVL: 75 MG/DL (ref 70–99)
GLUCOSE LVL: 75 MG/DL (ref 70–99)
HCT VFR BLD CALC: 28 % (ref 33–45)
HCT VFR BLD CALC: 28 % (ref 33–45)
HEMOLYSIS 2+: NEGATIVE
HGB BLD-MCNC: 8.7 G/DL (ref 11–15)
HGB BLD-MCNC: 8.7 G/DL (ref 11–15)
ICTERIC 4+: NEGATIVE
IMMATURE GRAN: 0.2 % (ref 0–0.3)
IMMATURE GRAN: 0.2 % (ref 0–0.3)
INSTR WBC: 5.1 K/CUMM (ref 4–11)
LIPASE LEVEL: 42 UNIT/L (ref 8–78)
LIPASE LEVEL: 42 UNIT/L (ref 8–78)
LIPEMIC 3+: NEGATIVE
LYMPHOCYTE: 40 %
LYMPHOCYTE: 40 %
Lab: CLEAR
MCH RBC QN AUTO: 27 PG (ref 25–35)
MCH RBC QN AUTO: 27 PG (ref 25–35)
MCHC RBC AUTO-ENTMCNC: 31 G/DL (ref 32–37)
MCHC RBC AUTO-ENTMCNC: 31 G/DL (ref 32–37)
MCV RBC AUTO: 87 FL (ref 78–97)
MCV RBC AUTO: 87 FL (ref 78–97)
MONOCYTE: 7 %
MONOCYTE: 7 %
NEUTROPHIL: 49 %
NEUTROPHIL: 49 %
NRBC BLD MANUAL-RTO: 0 % (ref 0–0.2)
NRBC BLD MANUAL-RTO: 0 % (ref 0–0.2)
PLATELET: 310 K/CUMM (ref 150–450)
PLATELET: 310 K/CUMM (ref 150–450)
POTASSIUM: 3.9 MEQ/L (ref 3.5–5.1)
POTASSIUM: 3.9 MEQ/L (ref 3.5–5.1)
RBC # BLD: 3.22 M/CUMM (ref 3.7–5.2)
RBC # BLD: 3.22 M/CUMM (ref 3.7–5.2)
RDW: 15.4 % (ref 11.5–14.5)
RDW: 15.4 % (ref 11.5–14.5)
SODIUM: 138 MEQ/L (ref 136–145)
SODIUM: 138 MEQ/L (ref 136–145)
TCO2: 20 MEQ/L (ref 19–29)
TCO2: 20 MEQ/L (ref 19–29)
TOTAL PROTEIN: 6.9 G/DL (ref 6.4–8.3)
TOTAL PROTEIN: 6.9 G/DL (ref 6.4–8.3)
UA APPEAR: CLEAR
UA APPEAR: CLEAR
UA BACTERIA: ABNORMAL
UA BACTERIA: ABNORMAL
UA BILI: NEGATIVE
UA BILI: NEGATIVE
UA BLOOD: ABNORMAL
UA BLOOD: ABNORMAL
UA COLOR: YELLOW
UA COLOR: YELLOW
UA EPITHELIAL: ABNORMAL
UA EPITHELIAL: ABNORMAL
UA GLUCOSE: NEGATIVE
UA GLUCOSE: NEGATIVE
UA KETONES: NEGATIVE
UA KETONES: NEGATIVE
UA LEUK EST: NEGATIVE
UA LEUK EST: NEGATIVE
UA NITRITE: NEGATIVE
UA NITRITE: NEGATIVE
UA PH: 6.5 (ref 5–7)
UA PH: 6.5 (ref 5–7)
UA PROTEIN: NEGATIVE
UA PROTEIN: NEGATIVE
UA RBC: ABNORMAL
UA RBC: ABNORMAL
UA SPEC GRAV: 1.02 (ref 1.01–1.02)
UA SPEC GRAV: 1.02 (ref 1.01–1.02)
UA UROBILINOGEN: 0.2 MG/DL (ref 0.2–1)
UA UROBILINOGEN: 0.2 MG/DL (ref 0.2–1)
UA WBC: ABNORMAL
UA WBC: ABNORMAL
URINE PREG POC: NEGATIVE
URINE PREG POC: NEGATIVE
WBC # BLD: 5.1 K/CUMM (ref 4–11)
WBC # BLD: 5.1 K/CUMM (ref 4–11)

## 2020-11-09 ENCOUNTER — APPOINTMENT (OUTPATIENT)
Dept: CT IMAGING | Age: 35
End: 2020-11-09
Attending: EMERGENCY MEDICINE

## 2020-11-09 ENCOUNTER — HOSPITAL ENCOUNTER (EMERGENCY)
Age: 35
Discharge: HOME | End: 2020-11-10
Attending: EMERGENCY MEDICINE

## 2020-11-09 DIAGNOSIS — R10.30 LOWER ABDOMINAL PAIN: Primary | ICD-10-CM

## 2020-11-09 LAB
ANION GAP SERPL CALC-SCNC: 6 MMOL/L (ref 10–20)
APPEARANCE UR: CLEAR
BASOPHILS # BLD: 0 K/MCL (ref 0–0.3)
BASOPHILS NFR BLD: 1 %
BILIRUB UR QL STRIP: NEGATIVE
BUN SERPL-MCNC: 7 MG/DL (ref 6–20)
BUN/CREAT SERPL: 13 (ref 7–25)
CALCIUM SERPL-MCNC: 9 MG/DL (ref 8.4–10.2)
CHLORIDE SERPL-SCNC: 108 MMOL/L (ref 98–107)
CO2 SERPL-SCNC: 29 MMOL/L (ref 21–32)
COLOR UR: YELLOW
CREAT SERPL-MCNC: 0.53 MG/DL (ref 0.51–0.95)
DIFFERENTIAL METHOD BLD: ABNORMAL
EOSINOPHIL # BLD: 0.1 K/MCL (ref 0.1–0.5)
EOSINOPHIL NFR BLD: 2 %
ERYTHROCYTE [DISTWIDTH] IN BLOOD: 15.2 % (ref 11–15)
GLUCOSE SERPL-MCNC: 74 MG/DL (ref 65–99)
GLUCOSE UR STRIP-MCNC: NEGATIVE MG/DL
HCG UR QL: NEGATIVE
HCT VFR BLD CALC: 28.5 % (ref 36–46.5)
HGB BLD-MCNC: 9 G/DL (ref 12–15.5)
HGB UR QL STRIP: NEGATIVE
IMM GRANULOCYTES # BLD AUTO: 0 K/MCL (ref 0–0.2)
IMM GRANULOCYTES NFR BLD: 0 %
KETONES UR STRIP-MCNC: NEGATIVE MG/DL
LEUKOCYTE ESTERASE UR QL STRIP: NEGATIVE
LYMPHOCYTES # BLD: 2.3 K/MCL (ref 1–4.8)
LYMPHOCYTES NFR BLD: 44 %
MCH RBC QN AUTO: 27.2 PG (ref 26–34)
MCHC RBC AUTO-ENTMCNC: 31.6 G/DL (ref 32–36.5)
MCV RBC AUTO: 86.1 FL (ref 78–100)
MONOCYTES # BLD: 0.4 K/MCL (ref 0.3–0.9)
MONOCYTES NFR BLD: 7 %
NEUTROPHILS # BLD: 2.4 K/MCL (ref 1.8–7.7)
NEUTROPHILS NFR BLD: 46 %
NITRITE UR QL STRIP: NEGATIVE
NRBC BLD MANUAL-RTO: 0 /100 WBC
PH UR STRIP: 8 UNITS (ref 5–7)
PLATELET # BLD: 339 K/MCL (ref 140–450)
POTASSIUM SERPL-SCNC: 4.2 MMOL/L (ref 3.4–5.1)
PROT UR STRIP-MCNC: NEGATIVE MG/DL
RBC # BLD: 3.31 MIL/MCL (ref 4–5.2)
SODIUM SERPL-SCNC: 139 MMOL/L (ref 135–145)
SP GR UR STRIP: 1.01 (ref 1–1.03)
UROBILINOGEN UR STRIP-MCNC: 0.2 MG/DL (ref 0–1)
WBC # BLD: 5.3 K/MCL (ref 4.2–11)

## 2020-11-09 PROCEDURE — 10002800 HB RX 250 W HCPCS: Performed by: EMERGENCY MEDICINE

## 2020-11-09 PROCEDURE — 99284 EMERGENCY DEPT VISIT MOD MDM: CPT

## 2020-11-09 PROCEDURE — 96374 THER/PROPH/DIAG INJ IV PUSH: CPT

## 2020-11-09 PROCEDURE — 96361 HYDRATE IV INFUSION ADD-ON: CPT

## 2020-11-09 PROCEDURE — 85025 COMPLETE CBC W/AUTO DIFF WBC: CPT

## 2020-11-09 PROCEDURE — 96375 TX/PRO/DX INJ NEW DRUG ADDON: CPT

## 2020-11-09 PROCEDURE — 10002807 HB RX 258: Performed by: EMERGENCY MEDICINE

## 2020-11-09 PROCEDURE — 81003 URINALYSIS AUTO W/O SCOPE: CPT

## 2020-11-09 PROCEDURE — 74176 CT ABD & PELVIS W/O CONTRAST: CPT

## 2020-11-09 PROCEDURE — 10002803 HB RX 637: Performed by: EMERGENCY MEDICINE

## 2020-11-09 PROCEDURE — 84703 CHORIONIC GONADOTROPIN ASSAY: CPT

## 2020-11-09 PROCEDURE — 80048 BASIC METABOLIC PNL TOTAL CA: CPT

## 2020-11-09 RX ORDER — ONDANSETRON 2 MG/ML
4 INJECTION INTRAMUSCULAR; INTRAVENOUS ONCE
Status: COMPLETED | OUTPATIENT
Start: 2020-11-09 | End: 2020-11-09

## 2020-11-09 RX ADMIN — ONDANSETRON 4 MG: 2 INJECTION INTRAMUSCULAR; INTRAVENOUS at 22:09

## 2020-11-09 RX ADMIN — HYOSCYAMINE SULFATE 0.12 MG: 0.12 TABLET SUBLINGUAL at 23:16

## 2020-11-09 RX ADMIN — SODIUM CHLORIDE 1000 ML: 9 INJECTION, SOLUTION INTRAVENOUS at 21:58

## 2020-11-09 RX ADMIN — MORPHINE SULFATE 4 MG: 4 INJECTION, SOLUTION INTRAMUSCULAR; INTRAVENOUS at 22:09

## 2020-11-09 SDOH — HEALTH STABILITY: MENTAL HEALTH: HOW OFTEN DO YOU HAVE A DRINK CONTAINING ALCOHOL?: NEVER

## 2020-11-09 ASSESSMENT — PAIN SCALES - GENERAL
PAINLEVEL_OUTOF10: 10
PAINLEVEL_OUTOF10: 10

## 2020-11-09 ASSESSMENT — PAIN DESCRIPTION - PAIN TYPE: TYPE: CHRONIC PAIN

## 2020-11-10 ENCOUNTER — TELEPHONE (OUTPATIENT)
Dept: OBGYN | Age: 35
End: 2020-11-10

## 2020-11-10 ENCOUNTER — OFFICE VISIT (OUTPATIENT)
Dept: OBGYN | Age: 35
End: 2020-11-10

## 2020-11-10 VITALS
HEART RATE: 66 BPM | DIASTOLIC BLOOD PRESSURE: 59 MMHG | SYSTOLIC BLOOD PRESSURE: 107 MMHG | OXYGEN SATURATION: 98 % | TEMPERATURE: 98.2 F | WEIGHT: 120 LBS | BODY MASS INDEX: 21.26 KG/M2 | RESPIRATION RATE: 17 BRPM | HEIGHT: 63 IN

## 2020-11-10 DIAGNOSIS — R10.2 PELVIC PAIN IN FEMALE: ICD-10-CM

## 2020-11-10 DIAGNOSIS — N92.0 MENORRHAGIA WITH REGULAR CYCLE: Primary | ICD-10-CM

## 2020-11-10 PROCEDURE — 99202 OFFICE O/P NEW SF 15 MIN: CPT | Performed by: OBSTETRICS & GYNECOLOGY

## 2020-11-10 RX ORDER — NORETHINDRONE ACETATE AND ETHINYL ESTRADIOL .03; 1.5 MG/1; MG/1
1 TABLET ORAL DAILY
Qty: 28 TABLET | Refills: 3 | Status: SHIPPED | OUTPATIENT
Start: 2020-11-10 | End: 2021-02-09 | Stop reason: SDUPTHER

## 2020-11-12 ENCOUNTER — HOSPITAL (OUTPATIENT)
Dept: OTHER | Age: 35
End: 2020-11-12
Attending: PHYSICIAN ASSISTANT

## 2020-11-15 ENCOUNTER — HOSPITAL ENCOUNTER (EMERGENCY)
Facility: HOSPITAL | Age: 35
Discharge: HOME | End: 2020-11-15
Attending: EMERGENCY MEDICINE
Payer: MEDICAID

## 2020-11-15 VITALS
RESPIRATION RATE: 18 BRPM | SYSTOLIC BLOOD PRESSURE: 112 MMHG | TEMPERATURE: 99 F | HEART RATE: 108 BPM | DIASTOLIC BLOOD PRESSURE: 79 MMHG | OXYGEN SATURATION: 100 %

## 2020-11-15 DIAGNOSIS — K08.89 TOOTH ACHE: Primary | ICD-10-CM

## 2020-11-15 DIAGNOSIS — K05.00 GINGIVITIS, ACUTE: ICD-10-CM

## 2020-11-15 PROCEDURE — 99283 EMERGENCY DEPT VISIT LOW MDM: CPT

## 2020-11-15 RX ORDER — AMOXICILLIN 500 MG/1
500 TABLET, FILM COATED ORAL 3 TIMES DAILY
Qty: 30 TABLET | Refills: 0 | Status: SHIPPED | OUTPATIENT
Start: 2020-11-15 | End: 2020-11-25

## 2020-11-15 RX ORDER — HYDROCODONE BITARTRATE AND ACETAMINOPHEN 5; 325 MG/1; MG/1
2 TABLET ORAL ONCE
Status: COMPLETED | OUTPATIENT
Start: 2020-11-15 | End: 2020-11-15

## 2020-11-15 RX ORDER — HYDROCODONE BITARTRATE AND ACETAMINOPHEN 5; 325 MG/1; MG/1
1-2 TABLET ORAL EVERY 6 HOURS PRN
Qty: 20 TABLET | Refills: 0 | Status: SHIPPED | OUTPATIENT
Start: 2020-11-15

## 2020-11-15 NOTE — ED PROVIDER NOTES
Patient Seen in: [facility] Emergency Department      History   Patient presents with:  Dental Problem    Stated Complaint: dental pain     HPI    79-year-old female comes to the hospital complaint of having difficulty with a tooth ache.   She has a with antibiotics as well as pain medications as encouraged follow-up dentistry                   Disposition and Plan     Clinical Impression:  Tooth ache  (primary encounter diagnosis)  Gingivitis, acute    Disposition:  Discharge  11/15/2020  3:41 pm

## 2020-11-15 NOTE — ED INITIAL ASSESSMENT (HPI)
Pt c.o teeth pain, pt has stabbing pain in all of teeth, pt has no pmhx, aox4, nad , skin warm and intact, pt ambulates steady and unassisted

## 2020-11-23 ENCOUNTER — HOSPITAL (OUTPATIENT)
Dept: OTHER | Age: 35
End: 2020-11-23
Attending: NURSE PRACTITIONER

## 2020-11-23 LAB
ABS LYMPH: 2.6 K/CUMM (ref 1–3.5)
ABS MONO: 0.5 K/CUMM (ref 0.1–0.8)
ABS NEUTRO: 1.8 K/CUMM (ref 2–8)
ALBUMIN SERPL BCP-MCNC: 3.8 G/DL (ref 3.5–5)
ALBUMIN/GLOB SERPL: 1.1 {RATIO}
ALP SERPL-CCNC: 60 UNIT/L (ref 50–124)
ALT SERPL W/O P-5'-P-CCNC: 10 UNIT/L (ref 0–55)
ANION GAP SERPL CALC-SCNC: 8 MEQ/L (ref 10–20)
APPEARANCE UR: CLEAR
AST SERPL-CCNC: 16 UNIT/L (ref 5–34)
BASOPHIL: 1 % (ref 0–1)
BILIRUB SERPL-MCNC: 0.2 MG/DL (ref 0.2–1)
BILIRUB UR STRIP-MCNC: NEGATIVE MG/DL
BLOOD UR QL VISUAL: NEGATIVE
BUN SERPL-MCNC: 5 MG/DL (ref 6–20)
CALCIUM SERPL-MCNC: 8.9 MG/DL (ref 8.4–10.2)
CHLORIDE SERPL-SCNC: 103 MEQ/L (ref 97–107)
CO2 SERPL-SCNC: 30 MEQ/L (ref 19–29)
COLOR UR AUTO: YELLOW
CREAT SERPL-MCNC: 0.64 MG/DL (ref 0.6–1.3)
CRP SERPL-MCNC: 25 MG/L (ref 0.1–8.2)
EOSINOPHIL: 5 % (ref 0–6)
ERYTHROCYTE [SEDIMENTATION RATE] IN BLOOD BY 15 MINUTE READING: 30 MM/HR (ref 0–20)
GLOBULIN SER CALC-MCNC: 3.6 G/DL (ref 2–4.1)
GLUCOSE SERPL-MCNC: 76 MG/DL (ref 70–99)
GLUCOSE UR QL STRIP.AUTO: NEGATIVE
HCG UR QL: NEGATIVE
HCT VFR BLD CALC: 29 % (ref 33–45)
HGB BLD-MCNC: 8.9 G/DL (ref 11–15)
IMMATURE GRAN: 0.2 % (ref 0–0.3)
KETONES UR QL STRIP.AUTO: NEGATIVE
LEUKOCYTE ESTERASE UR QL STRIP.AUTO: NEGATIVE
LYMPHOCYTE: 50 %
MCH RBC QN AUTO: 27 PG (ref 25–35)
MCHC RBC AUTO-ENTMCNC: 31 G/DL (ref 32–37)
MCV RBC AUTO: 86 FL (ref 78–97)
MONOCYTE: 9 %
NEUTROPHIL: 35 %
NITRITE UR QL STRIP.AUTO: NEGATIVE
NRBC BLD MANUAL-RTO: 0 % (ref 0–0.2)
PH UR STRIP.AUTO: 7.5 [PH] (ref 5–7)
PLATELET: 301 K/CUMM (ref 150–450)
POTASSIUM SERPL-SCNC: 3.7 MEQ/L (ref 3.5–5.1)
PROT SERPL-MCNC: 7.4 G/DL (ref 6.4–8.3)
PROT UR STRIP.AUTO-MCNC: NEGATIVE MG/DL
RBC # BLD: 3.35 M/CUMM (ref 3.7–5.2)
RDW: 14.5 % (ref 11.5–14.5)
SODIUM SERPL-SCNC: 137 MEQ/L (ref 136–145)
SP GR UR STRIP.AUTO: 1.01 (ref 1.01–1.02)
UROBILINOGEN UR QL STRIP.AUTO: 0.2 MG/DL (ref 0.2–1)
WBC # BLD: 5.1 K/CUMM (ref 4–11)

## 2020-12-01 ENCOUNTER — HOSPITAL (OUTPATIENT)
Dept: OTHER | Age: 35
End: 2020-12-01
Attending: PHYSICIAN ASSISTANT

## 2020-12-02 ENCOUNTER — HOSPITAL ENCOUNTER (EMERGENCY)
Facility: HOSPITAL | Age: 35
Discharge: HOME | End: 2020-12-02
Attending: EMERGENCY MEDICINE
Payer: MEDICAID

## 2020-12-02 ENCOUNTER — APPOINTMENT (OUTPATIENT)
Dept: ULTRASOUND IMAGING | Facility: HOSPITAL | Age: 35
End: 2020-12-02
Attending: EMERGENCY MEDICINE
Payer: MEDICAID

## 2020-12-02 VITALS
TEMPERATURE: 97 F | DIASTOLIC BLOOD PRESSURE: 65 MMHG | SYSTOLIC BLOOD PRESSURE: 105 MMHG | WEIGHT: 120 LBS | OXYGEN SATURATION: 99 % | BODY MASS INDEX: 21 KG/M2 | HEART RATE: 77 BPM | RESPIRATION RATE: 15 BRPM

## 2020-12-02 DIAGNOSIS — R10.30 LOWER ABDOMINAL PAIN: Primary | ICD-10-CM

## 2020-12-02 DIAGNOSIS — K08.89 TOOTH ACHE: ICD-10-CM

## 2020-12-02 PROCEDURE — 93975 VASCULAR STUDY: CPT | Performed by: EMERGENCY MEDICINE

## 2020-12-02 PROCEDURE — 99284 EMERGENCY DEPT VISIT MOD MDM: CPT

## 2020-12-02 PROCEDURE — 96374 THER/PROPH/DIAG INJ IV PUSH: CPT

## 2020-12-02 PROCEDURE — 76856 US EXAM PELVIC COMPLETE: CPT | Performed by: EMERGENCY MEDICINE

## 2020-12-02 PROCEDURE — 80053 COMPREHEN METABOLIC PANEL: CPT | Performed by: EMERGENCY MEDICINE

## 2020-12-02 PROCEDURE — 85025 COMPLETE CBC W/AUTO DIFF WBC: CPT | Performed by: EMERGENCY MEDICINE

## 2020-12-02 PROCEDURE — 81025 URINE PREGNANCY TEST: CPT

## 2020-12-02 PROCEDURE — 81001 URINALYSIS AUTO W/SCOPE: CPT | Performed by: EMERGENCY MEDICINE

## 2020-12-02 PROCEDURE — 83690 ASSAY OF LIPASE: CPT | Performed by: EMERGENCY MEDICINE

## 2020-12-02 RX ORDER — ACETAMINOPHEN 500 MG
1000 TABLET ORAL ONCE
Status: DISCONTINUED | OUTPATIENT
Start: 2020-12-02 | End: 2020-12-02

## 2020-12-02 RX ORDER — KETOROLAC TROMETHAMINE 30 MG/ML
15 INJECTION, SOLUTION INTRAMUSCULAR; INTRAVENOUS ONCE
Status: COMPLETED | OUTPATIENT
Start: 2020-12-02 | End: 2020-12-02

## 2020-12-03 NOTE — ED NOTES
Pt left prior to getting tylenol or getting discharge papers. States she does not want tylenol and doesn't need papers. Left ambulatory in good condition.

## 2020-12-03 NOTE — ED NOTES
Assumed care of pt, returning from ultrasound. Pt reports pain as 10/10, states Toradol gave no relief.

## 2020-12-03 NOTE — ED PROVIDER NOTES
Patient Seen in: [facility] Emergency Department      History   Patient presents with:  Abdomen/Flank Pain    Stated Complaint: abdominal pain    HPI    45-year-old female presents emergency room with multiple complaints.   Patient states she has bee 21.26 kg/m²         Physical Exam    GENERAL: Patient is awake, alert, well-appearing, in no acute distress. HEENT:  no scleral icterus. There is poor dentition throughout.   Patient does have dental decay of multiple teeth including left upper first mola PREGNANCY, URINE   RAINBOW DRAW BLUE   RAINBOW DRAW LAVENDER   RAINBOW DRAW LIGHT GREEN   RAINBOW DRAW GOLD                  MDM      Patient IV established, blood work obtained. CBC and chemistry reviewed.   Patient is anemic with a hemoglobin of 9.5, thi Disposition and Plan     Clinical Impression:  Lower abdominal pain  (primary encounter diagnosis)  Tooth ache    Disposition:  Discharge  12/8/2020  1:23 pm    Follow-up:  Hannah Gunter  [address] [facility]  [place] [address] [phone]    In 2 days

## 2020-12-05 ENCOUNTER — HOSPITAL ENCOUNTER (EMERGENCY)
Age: 35
Discharge: HOME | End: 2020-12-05

## 2020-12-05 VITALS
RESPIRATION RATE: 18 BRPM | BODY MASS INDEX: 19.06 KG/M2 | HEIGHT: 63 IN | SYSTOLIC BLOOD PRESSURE: 126 MMHG | WEIGHT: 107.58 LBS | OXYGEN SATURATION: 100 % | DIASTOLIC BLOOD PRESSURE: 87 MMHG | TEMPERATURE: 97 F | HEART RATE: 106 BPM

## 2020-12-05 DIAGNOSIS — K08.89 PAIN, DENTAL: Primary | ICD-10-CM

## 2020-12-05 PROCEDURE — 10002803 HB RX 637: Performed by: NURSE PRACTITIONER

## 2020-12-05 PROCEDURE — 99283 EMERGENCY DEPT VISIT LOW MDM: CPT

## 2020-12-05 RX ORDER — TRAMADOL HYDROCHLORIDE 50 MG/1
50 TABLET ORAL EVERY 4 HOURS PRN
Qty: 30 TABLET | Refills: 0 | Status: SHIPPED | OUTPATIENT
Start: 2020-12-05 | End: 2020-12-05 | Stop reason: SDUPTHER

## 2020-12-05 RX ORDER — TRAMADOL HYDROCHLORIDE 50 MG/1
50 TABLET ORAL EVERY 4 HOURS PRN
Qty: 30 TABLET | Refills: 0 | OUTPATIENT
Start: 2020-12-05 | End: 2020-12-10

## 2020-12-05 RX ORDER — HYDROCODONE BITARTRATE AND ACETAMINOPHEN 5; 325 MG/1; MG/1
1 TABLET ORAL ONCE
Status: COMPLETED | OUTPATIENT
Start: 2020-12-05 | End: 2020-12-05

## 2020-12-05 RX ORDER — AMOXICILLIN 500 MG/1
500 CAPSULE ORAL 3 TIMES DAILY
Qty: 21 CAPSULE | Refills: 0 | Status: SHIPPED | OUTPATIENT
Start: 2020-12-05 | End: 2020-12-12

## 2020-12-05 RX ADMIN — HYDROCODONE BITARTRATE AND ACETAMINOPHEN 1 TABLET: 5; 325 TABLET ORAL at 14:37

## 2020-12-05 ASSESSMENT — PAIN SCALES - PAIN ASSESSMENT IN ADVANCED DEMENTIA (PAINAD)
BREATHING: NORMAL
TOTALSCORE: 0
CONSOLABILITY: NO NEED TO CONSOLE
BODYLANGUAGE: RELAXED
FACIALEXPRESSION: SMILING OR INEXPRESSIVE

## 2020-12-05 ASSESSMENT — PAIN SCALES - WONG BAKER: WONGBAKER_NUMERICALRESPONSE: 10

## 2020-12-05 ASSESSMENT — ENCOUNTER SYMPTOMS
HEMATOLOGIC/LYMPHATIC NEGATIVE: 1
ENDOCRINE NEGATIVE: 1
PSYCHIATRIC NEGATIVE: 1
EYES NEGATIVE: 1
CONSTITUTIONAL NEGATIVE: 1
GASTROINTESTINAL NEGATIVE: 1
NEUROLOGICAL NEGATIVE: 1
RESPIRATORY NEGATIVE: 1
ALLERGIC/IMMUNOLOGIC NEGATIVE: 1

## 2020-12-05 ASSESSMENT — PAIN SCALES - BEHAVIORAL PAIN SCALE (BPS): BPS_SCORE: 3

## 2020-12-05 ASSESSMENT — PAIN SCALES - GENERAL: PAINLEVEL_OUTOF10: 10

## 2020-12-10 ENCOUNTER — HOSPITAL ENCOUNTER (EMERGENCY)
Age: 35
Discharge: HOME | End: 2020-12-10

## 2020-12-10 VITALS
TEMPERATURE: 98.6 F | OXYGEN SATURATION: 100 % | RESPIRATION RATE: 16 BRPM | DIASTOLIC BLOOD PRESSURE: 84 MMHG | BODY MASS INDEX: 18.87 KG/M2 | HEIGHT: 63 IN | SYSTOLIC BLOOD PRESSURE: 116 MMHG | HEART RATE: 116 BPM | WEIGHT: 106.48 LBS

## 2020-12-10 DIAGNOSIS — G89.29 CHRONIC LEFT HIP PAIN: ICD-10-CM

## 2020-12-10 DIAGNOSIS — M25.552 LEFT HIP PAIN: Primary | ICD-10-CM

## 2020-12-10 DIAGNOSIS — M25.552 CHRONIC LEFT HIP PAIN: ICD-10-CM

## 2020-12-10 PROCEDURE — 10002803 HB RX 637: Performed by: NURSE PRACTITIONER

## 2020-12-10 PROCEDURE — 99282 EMERGENCY DEPT VISIT SF MDM: CPT

## 2020-12-10 RX ORDER — TRAMADOL HYDROCHLORIDE 50 MG/1
50 TABLET ORAL EVERY 8 HOURS PRN
Qty: 5 TABLET | Refills: 0 | Status: SHIPPED | OUTPATIENT
Start: 2020-12-10 | End: 2020-12-10 | Stop reason: SDUPTHER

## 2020-12-10 RX ORDER — TRAMADOL HYDROCHLORIDE 50 MG/1
50 TABLET ORAL ONCE
Status: COMPLETED | OUTPATIENT
Start: 2020-12-10 | End: 2020-12-10

## 2020-12-10 RX ORDER — TRAMADOL HYDROCHLORIDE 50 MG/1
50 TABLET ORAL EVERY 8 HOURS PRN
Qty: 5 TABLET | Refills: 0 | Status: SHIPPED | OUTPATIENT
Start: 2020-12-10 | End: 2021-01-09 | Stop reason: ALTCHOICE

## 2020-12-10 RX ADMIN — TRAMADOL HYDROCHLORIDE 50 MG: 50 TABLET, FILM COATED ORAL at 12:20

## 2020-12-10 ASSESSMENT — PAIN SCALES - GENERAL: PAINLEVEL_OUTOF10: 1

## 2020-12-11 ENCOUNTER — HOSPITAL ENCOUNTER (EMERGENCY)
Age: 35
Discharge: HOME | End: 2020-12-11

## 2020-12-11 VITALS
SYSTOLIC BLOOD PRESSURE: 122 MMHG | TEMPERATURE: 98.2 F | DIASTOLIC BLOOD PRESSURE: 82 MMHG | RESPIRATION RATE: 16 BRPM | BODY MASS INDEX: 18.65 KG/M2 | HEIGHT: 63 IN | WEIGHT: 105.27 LBS | OXYGEN SATURATION: 100 % | HEART RATE: 98 BPM

## 2020-12-11 DIAGNOSIS — M25.552 LEFT HIP PAIN: Primary | ICD-10-CM

## 2020-12-11 PROCEDURE — 10002803 HB RX 637: Performed by: NURSE PRACTITIONER

## 2020-12-11 PROCEDURE — 99283 EMERGENCY DEPT VISIT LOW MDM: CPT

## 2020-12-11 RX ORDER — HYDROCODONE BITARTRATE AND ACETAMINOPHEN 5; 325 MG/1; MG/1
1 TABLET ORAL EVERY 4 HOURS PRN
Qty: 5 TABLET | Refills: 0 | Status: SHIPPED | OUTPATIENT
Start: 2020-12-11

## 2020-12-11 RX ORDER — HYDROCODONE BITARTRATE AND ACETAMINOPHEN 5; 325 MG/1; MG/1
1 TABLET ORAL ONCE
Status: COMPLETED | OUTPATIENT
Start: 2020-12-11 | End: 2020-12-11

## 2020-12-11 RX ADMIN — HYDROCODONE BITARTRATE AND ACETAMINOPHEN 1 TABLET: 5; 325 TABLET ORAL at 12:01

## 2020-12-11 ASSESSMENT — ENCOUNTER SYMPTOMS
ENDOCRINE NEGATIVE: 1
HEMATOLOGIC/LYMPHATIC NEGATIVE: 1
PSYCHIATRIC NEGATIVE: 1
EYES NEGATIVE: 1
ALLERGIC/IMMUNOLOGIC NEGATIVE: 1
NUMBNESS: 0
RESPIRATORY NEGATIVE: 1
CONSTITUTIONAL NEGATIVE: 1
GASTROINTESTINAL NEGATIVE: 1
LOSS OF SENSATION: 0
TINGLING: 0
NEUROLOGICAL NEGATIVE: 1

## 2020-12-11 ASSESSMENT — PAIN SCALES - GENERAL: PAINLEVEL_OUTOF10: 10

## 2021-01-09 ENCOUNTER — HOSPITAL ENCOUNTER (EMERGENCY)
Age: 36
Discharge: HOME | End: 2021-01-09

## 2021-01-09 VITALS
BODY MASS INDEX: 21.26 KG/M2 | WEIGHT: 120 LBS | HEART RATE: 114 BPM | DIASTOLIC BLOOD PRESSURE: 78 MMHG | SYSTOLIC BLOOD PRESSURE: 122 MMHG | TEMPERATURE: 97.3 F | RESPIRATION RATE: 18 BRPM | HEIGHT: 63 IN | OXYGEN SATURATION: 99 %

## 2021-01-09 DIAGNOSIS — S09.93XA DENTAL INJURY, INITIAL ENCOUNTER: Primary | ICD-10-CM

## 2021-01-09 PROCEDURE — 10002803 HB RX 637: Performed by: PHYSICIAN ASSISTANT

## 2021-01-09 PROCEDURE — 99282 EMERGENCY DEPT VISIT SF MDM: CPT

## 2021-01-09 RX ORDER — TRAMADOL HYDROCHLORIDE 50 MG/1
50 TABLET ORAL EVERY 6 HOURS PRN
Qty: 8 TABLET | Refills: 0 | Status: SHIPPED | OUTPATIENT
Start: 2021-01-09 | End: 2021-01-11

## 2021-01-09 RX ORDER — TRAMADOL HYDROCHLORIDE 50 MG/1
50 TABLET ORAL ONCE
Status: COMPLETED | OUTPATIENT
Start: 2021-01-09 | End: 2021-01-09

## 2021-01-09 RX ORDER — AMOXICILLIN AND CLAVULANATE POTASSIUM 875; 125 MG/1; MG/1
1 TABLET, FILM COATED ORAL EVERY 12 HOURS
Qty: 14 TABLET | Refills: 0 | Status: SHIPPED | OUTPATIENT
Start: 2021-01-09 | End: 2021-01-16

## 2021-01-09 RX ADMIN — TRAMADOL HYDROCHLORIDE 50 MG: 50 TABLET, FILM COATED ORAL at 12:25

## 2021-01-09 ASSESSMENT — ENCOUNTER SYMPTOMS
SORE THROAT: 0
FEVER: 0
COLOR CHANGE: 0
BACK PAIN: 0
FATIGUE: 0
EYE PAIN: 0
SHORTNESS OF BREATH: 0
DIARRHEA: 0
RHINORRHEA: 0
ABDOMINAL PAIN: 0
WEAKNESS: 0
VOMITING: 0
HEADACHES: 0
COUGH: 0

## 2021-01-09 ASSESSMENT — PAIN SCALES - GENERAL: PAINLEVEL_OUTOF10: 10

## 2021-01-13 ENCOUNTER — HOSPITAL ENCOUNTER (EMERGENCY)
Age: 36
Discharge: HOME | End: 2021-01-13

## 2021-01-13 VITALS
HEART RATE: 90 BPM | TEMPERATURE: 96.6 F | WEIGHT: 110.45 LBS | BODY MASS INDEX: 19.57 KG/M2 | RESPIRATION RATE: 18 BRPM | DIASTOLIC BLOOD PRESSURE: 70 MMHG | OXYGEN SATURATION: 100 % | SYSTOLIC BLOOD PRESSURE: 130 MMHG | HEIGHT: 63 IN

## 2021-01-13 DIAGNOSIS — G89.29 CHRONIC DENTAL PAIN: Primary | ICD-10-CM

## 2021-01-13 DIAGNOSIS — K08.9 CHRONIC DENTAL PAIN: Primary | ICD-10-CM

## 2021-01-13 PROCEDURE — 99283 EMERGENCY DEPT VISIT LOW MDM: CPT

## 2021-01-13 PROCEDURE — 10002803 HB RX 637: Performed by: PHYSICIAN ASSISTANT

## 2021-01-13 RX ORDER — CLINDAMYCIN HYDROCHLORIDE 300 MG/1
300 CAPSULE ORAL 3 TIMES DAILY
Qty: 21 CAPSULE | Refills: 0 | Status: SHIPPED | OUTPATIENT
Start: 2021-01-13 | End: 2021-01-20

## 2021-01-13 RX ORDER — TRAMADOL HYDROCHLORIDE 50 MG/1
50 TABLET ORAL ONCE
Status: COMPLETED | OUTPATIENT
Start: 2021-01-13 | End: 2021-01-13

## 2021-01-13 RX ADMIN — TRAMADOL HYDROCHLORIDE 50 MG: 50 TABLET, FILM COATED ORAL at 09:17

## 2021-01-13 ASSESSMENT — ENCOUNTER SYMPTOMS
VOMITING: 0
NAUSEA: 0
CHEST TIGHTNESS: 0
CONSTIPATION: 0
HEADACHES: 0
EYE DISCHARGE: 0
COUGH: 0
LIGHT-HEADEDNESS: 0
DIARRHEA: 0
ABDOMINAL PAIN: 0
EYE REDNESS: 0
CHILLS: 0
FEVER: 0
DIZZINESS: 0
TROUBLE SWALLOWING: 0
SORE THROAT: 0
PSYCHIATRIC NEGATIVE: 1
SHORTNESS OF BREATH: 0
ACTIVITY CHANGE: 0

## 2021-01-13 ASSESSMENT — PAIN DESCRIPTION - PAIN TYPE: TYPE: CHRONIC PAIN

## 2021-01-13 ASSESSMENT — PAIN SCALES - PAIN ASSESSMENT IN ADVANCED DEMENTIA (PAINAD): BREATHING: NORMAL

## 2021-01-13 ASSESSMENT — PAIN SCALES - GENERAL: PAINLEVEL_OUTOF10: 10

## 2021-01-13 ASSESSMENT — VISUAL ACUITY: OU: 1

## 2021-02-01 ENCOUNTER — HOSPITAL ENCOUNTER (EMERGENCY)
Age: 36
Discharge: HOME | End: 2021-02-01

## 2021-02-01 VITALS
SYSTOLIC BLOOD PRESSURE: 122 MMHG | BODY MASS INDEX: 19.57 KG/M2 | HEIGHT: 63 IN | RESPIRATION RATE: 16 BRPM | OXYGEN SATURATION: 100 % | HEART RATE: 70 BPM | WEIGHT: 110.45 LBS | TEMPERATURE: 99.6 F | DIASTOLIC BLOOD PRESSURE: 81 MMHG

## 2021-02-01 DIAGNOSIS — K04.7 CHRONIC DENTAL INFECTION: Primary | ICD-10-CM

## 2021-02-01 DIAGNOSIS — K08.9 CHRONIC DENTAL PAIN: ICD-10-CM

## 2021-02-01 DIAGNOSIS — G89.29 CHRONIC DENTAL PAIN: ICD-10-CM

## 2021-02-01 PROCEDURE — 99283 EMERGENCY DEPT VISIT LOW MDM: CPT

## 2021-02-01 PROCEDURE — 10002803 HB RX 637: Performed by: PHYSICIAN ASSISTANT

## 2021-02-01 RX ORDER — TRAMADOL HYDROCHLORIDE 50 MG/1
50 TABLET ORAL ONCE
Status: COMPLETED | OUTPATIENT
Start: 2021-02-01 | End: 2021-02-01

## 2021-02-01 RX ORDER — CLINDAMYCIN HYDROCHLORIDE 300 MG/1
300 CAPSULE ORAL 3 TIMES DAILY
Qty: 30 CAPSULE | Refills: 0 | Status: SHIPPED | OUTPATIENT
Start: 2021-02-01 | End: 2021-02-11

## 2021-02-01 RX ADMIN — TRAMADOL HYDROCHLORIDE 50 MG: 50 TABLET, FILM COATED ORAL at 11:55

## 2021-02-01 ASSESSMENT — ENCOUNTER SYMPTOMS
ABDOMINAL PAIN: 0
HEADACHES: 0
FEVER: 0
SORE THROAT: 0
SHORTNESS OF BREATH: 0
CONFUSION: 0
RHINORRHEA: 0

## 2021-02-03 ENCOUNTER — HOSPITAL ENCOUNTER (EMERGENCY)
Facility: HOSPITAL | Age: 36
Discharge: HOME | End: 2021-02-03
Attending: EMERGENCY MEDICINE
Payer: MEDICAID

## 2021-02-03 VITALS
HEART RATE: 118 BPM | WEIGHT: 120 LBS | OXYGEN SATURATION: 100 % | BODY MASS INDEX: 21.26 KG/M2 | RESPIRATION RATE: 18 BRPM | HEIGHT: 63 IN | SYSTOLIC BLOOD PRESSURE: 117 MMHG | TEMPERATURE: 98 F | DIASTOLIC BLOOD PRESSURE: 76 MMHG

## 2021-02-03 DIAGNOSIS — K02.9 TOOTH DECAY: Primary | ICD-10-CM

## 2021-02-03 PROCEDURE — 99283 EMERGENCY DEPT VISIT LOW MDM: CPT

## 2021-02-03 RX ORDER — PENICILLIN V POTASSIUM 500 MG/1
500 TABLET ORAL EVERY 6 HOURS SCHEDULED
Status: DISCONTINUED | OUTPATIENT
Start: 2021-02-03 | End: 2021-02-03

## 2021-02-03 RX ORDER — PENICILLIN V POTASSIUM 500 MG/1
500 TABLET ORAL 4 TIMES DAILY
Qty: 40 TABLET | Refills: 0 | Status: SHIPPED | OUTPATIENT
Start: 2021-02-03 | End: 2021-02-13

## 2021-02-03 NOTE — ED NOTES
PT NOTIFIED WAITING FOR MEDICATION FROM PHARMACY. PT UPSET WITH WAIT, STATES \" I NEED TO LEAVE SOON. \" PT AWARE SHE CAN LEAVE AT HER OWN DISCRETION HOWEVER RN CAN NOT ADMINISTER MEDICATION UNTIL IT ARRIVES FROM PHARMACY.

## 2021-02-03 NOTE — ED PROVIDER NOTES
Patient Seen in: [facility] Emergency Department      History   Patient presents with:  Dental Problem    Stated Complaint: cracked tooth    HPI/Subjective:   HPI    74-year-old female who presents to the emerge department complaint of having tooth caries noted and gingival irritation. Uvula is midline airways patent. No stridorous breathing noted. No facial asymmetry. Pupils reactive the light in the room. Her eyes have extraocular motions are intact because she tracks me around the room.   Lung

## 2021-02-03 NOTE — ED NOTES
PT GIVEN DISCHARGE PAPERS AND EDUCATED ON MEDICATION ADMINISTRATION. PT TALKING ON PHONE DURING DISCHARGE, UNABLE TO TAKE LAST SET OF VITALS PRIOR TO DISCHARGE.

## 2021-02-03 NOTE — ED INITIAL ASSESSMENT (HPI)
PT TO ED FROM HOME WITH C/O LEFT UPPER DENTAL PAIN WITH HEADACHE. DENIES N/V, DENIES FEVERS. PT REPORTS NO APPOINTMENT WITH DENTIST SCHEDULED.  PT REQUESTS PAIN MEDICATION AND ANTIBIOTICS

## 2021-02-05 ENCOUNTER — HOSPITAL ENCOUNTER (EMERGENCY)
Age: 36
Discharge: HOME | End: 2021-02-05

## 2021-02-05 VITALS
BODY MASS INDEX: 19.8 KG/M2 | HEART RATE: 113 BPM | OXYGEN SATURATION: 100 % | DIASTOLIC BLOOD PRESSURE: 64 MMHG | TEMPERATURE: 99.1 F | HEIGHT: 63 IN | WEIGHT: 111.77 LBS | SYSTOLIC BLOOD PRESSURE: 106 MMHG | RESPIRATION RATE: 16 BRPM

## 2021-02-05 DIAGNOSIS — Z76.5 DRUG-SEEKING BEHAVIOR: Primary | ICD-10-CM

## 2021-02-05 DIAGNOSIS — G89.29 CHRONIC DENTAL PAIN: ICD-10-CM

## 2021-02-05 DIAGNOSIS — K08.9 CHRONIC DENTAL PAIN: ICD-10-CM

## 2021-02-05 PROCEDURE — 10002803 HB RX 637: Performed by: PHYSICIAN ASSISTANT

## 2021-02-05 PROCEDURE — 99283 EMERGENCY DEPT VISIT LOW MDM: CPT

## 2021-02-05 RX ORDER — HYDROCODONE BITARTRATE AND ACETAMINOPHEN 7.5; 325 MG/1; MG/1
1 TABLET ORAL ONCE
Status: COMPLETED | OUTPATIENT
Start: 2021-02-05 | End: 2021-02-05

## 2021-02-05 RX ORDER — HYDROCODONE BITARTRATE AND ACETAMINOPHEN 5; 325 MG/1; MG/1
1 TABLET ORAL ONCE
Status: DISCONTINUED | OUTPATIENT
Start: 2021-02-05 | End: 2021-02-05

## 2021-02-05 RX ORDER — LIDOCAINE HYDROCHLORIDE 20 MG/ML
10 SOLUTION OROPHARYNGEAL PRN
Qty: 1200 ML | Refills: 0 | OUTPATIENT
Start: 2021-02-05 | End: 2021-09-04

## 2021-02-05 RX ADMIN — HYDROCODONE BITARTRATE AND ACETAMINOPHEN 1 TABLET: 7.5; 325 TABLET ORAL at 15:15

## 2021-02-05 ASSESSMENT — ENCOUNTER SYMPTOMS
SINUS PAIN: 0
GASTROINTESTINAL NEGATIVE: 1
RHINORRHEA: 0
HEMATOLOGIC/LYMPHATIC NEGATIVE: 1
PHOTOPHOBIA: 0
NEUROLOGICAL NEGATIVE: 1
VOICE CHANGE: 0
SORE THROAT: 0
SHORTNESS OF BREATH: 0
PSYCHIATRIC NEGATIVE: 1
EYE PAIN: 0
ENDOCRINE NEGATIVE: 1
ALLERGIC/IMMUNOLOGIC NEGATIVE: 1
CHILLS: 0
COUGH: 0
CHOKING: 0
FEVER: 0

## 2021-02-05 ASSESSMENT — PAIN SCALES - GENERAL
PAINLEVEL_OUTOF10: 10
PAINLEVEL_OUTOF10: 10

## 2021-02-05 ASSESSMENT — VISUAL ACUITY: OU: 1

## 2021-02-05 ASSESSMENT — PAIN DESCRIPTION - PAIN TYPE: TYPE: ACUTE PAIN

## 2021-02-08 ENCOUNTER — TELEPHONE (OUTPATIENT)
Dept: OBGYN | Age: 36
End: 2021-02-08

## 2021-02-09 ENCOUNTER — APPOINTMENT (OUTPATIENT)
Dept: OBGYN | Age: 36
End: 2021-02-09

## 2021-02-09 RX ORDER — NORETHINDRONE ACETATE AND ETHINYL ESTRADIOL .03; 1.5 MG/1; MG/1
1 TABLET ORAL DAILY
Qty: 28 TABLET | Refills: 0 | Status: SHIPPED | OUTPATIENT
Start: 2021-02-09

## 2021-05-08 ENCOUNTER — HOSPITAL ENCOUNTER (EMERGENCY)
Age: 36
Discharge: HOME | End: 2021-05-08

## 2021-05-08 ENCOUNTER — APPOINTMENT (OUTPATIENT)
Dept: ULTRASOUND IMAGING | Age: 36
End: 2021-05-08

## 2021-05-08 VITALS
TEMPERATURE: 97.8 F | DIASTOLIC BLOOD PRESSURE: 68 MMHG | SYSTOLIC BLOOD PRESSURE: 118 MMHG | HEIGHT: 63 IN | RESPIRATION RATE: 16 BRPM | WEIGHT: 110.67 LBS | OXYGEN SATURATION: 100 % | HEART RATE: 97 BPM | BODY MASS INDEX: 19.61 KG/M2

## 2021-05-08 DIAGNOSIS — R60.0 EDEMA OF BOTH LEGS: ICD-10-CM

## 2021-05-08 DIAGNOSIS — M79.604 RIGHT LEG PAIN: Primary | ICD-10-CM

## 2021-05-08 LAB
ANION GAP SERPL CALC-SCNC: 12 MMOL/L (ref 10–20)
APTT PPP: 34 SEC (ref 22–30)
BASOPHILS # BLD: 0 K/MCL (ref 0–0.3)
BASOPHILS NFR BLD: 1 %
BUN SERPL-MCNC: 8 MG/DL (ref 6–20)
BUN/CREAT SERPL: 13 (ref 7–25)
CALCIUM SERPL-MCNC: 8.8 MG/DL (ref 8.4–10.2)
CHLORIDE SERPL-SCNC: 102 MMOL/L (ref 98–107)
CO2 SERPL-SCNC: 26 MMOL/L (ref 21–32)
CREAT SERPL-MCNC: 0.62 MG/DL (ref 0.51–0.95)
DEPRECATED RDW RBC: 49.7 FL (ref 39–50)
EOSINOPHIL # BLD: 0.1 K/MCL (ref 0–0.5)
EOSINOPHIL NFR BLD: 3 %
ERYTHROCYTE [DISTWIDTH] IN BLOOD: 14.8 % (ref 11–15)
FASTING DURATION TIME PATIENT: NORMAL H
GFR SERPLBLD BASED ON 1.73 SQ M-ARVRAT: >90 ML/MIN/1.73M2
GLUCOSE SERPL-MCNC: 84 MG/DL (ref 65–99)
HCT VFR BLD CALC: 39 % (ref 36–46.5)
HGB BLD-MCNC: 12.3 G/DL (ref 12–15.5)
IMM GRANULOCYTES # BLD AUTO: 0 K/MCL (ref 0–0.2)
IMM GRANULOCYTES # BLD: 0 %
INR PPP: 2.1
LYMPHOCYTES # BLD: 1.5 K/MCL (ref 1–4.8)
LYMPHOCYTES NFR BLD: 30 %
MCH RBC QN AUTO: 28.5 PG (ref 26–34)
MCHC RBC AUTO-ENTMCNC: 31.5 G/DL (ref 32–36.5)
MCV RBC AUTO: 90.5 FL (ref 78–100)
MONOCYTES # BLD: 0.4 K/MCL (ref 0.3–0.9)
MONOCYTES NFR BLD: 7 %
NEUTROPHILS # BLD: 2.9 K/MCL (ref 1.8–7.7)
NEUTROPHILS NFR BLD: 59 %
NRBC BLD MANUAL-RTO: 0 /100 WBC
NT-PROBNP SERPL-MCNC: 19 PG/ML
PLATELET # BLD AUTO: 376 K/MCL (ref 140–450)
POTASSIUM SERPL-SCNC: 3.9 MMOL/L (ref 3.4–5.1)
PROTHROMBIN TIME: 20.2 SEC (ref 9.7–11.8)
RBC # BLD: 4.31 MIL/MCL (ref 4–5.2)
SODIUM SERPL-SCNC: 136 MMOL/L (ref 135–145)
TROPONIN I SERPL HS-MCNC: <0.02 NG/ML
WBC # BLD: 4.9 K/MCL (ref 4.2–11)

## 2021-05-08 PROCEDURE — 85025 COMPLETE CBC W/AUTO DIFF WBC: CPT | Performed by: PHYSICIAN ASSISTANT

## 2021-05-08 PROCEDURE — 99284 EMERGENCY DEPT VISIT MOD MDM: CPT

## 2021-05-08 PROCEDURE — 80048 BASIC METABOLIC PNL TOTAL CA: CPT | Performed by: PHYSICIAN ASSISTANT

## 2021-05-08 PROCEDURE — 85610 PROTHROMBIN TIME: CPT | Performed by: PHYSICIAN ASSISTANT

## 2021-05-08 PROCEDURE — 96374 THER/PROPH/DIAG INJ IV PUSH: CPT

## 2021-05-08 PROCEDURE — 10002803 HB RX 637: Performed by: PHYSICIAN ASSISTANT

## 2021-05-08 PROCEDURE — 85730 THROMBOPLASTIN TIME PARTIAL: CPT | Performed by: PHYSICIAN ASSISTANT

## 2021-05-08 PROCEDURE — 10002800 HB RX 250 W HCPCS: Performed by: PHYSICIAN ASSISTANT

## 2021-05-08 PROCEDURE — 93005 ELECTROCARDIOGRAM TRACING: CPT | Performed by: PHYSICIAN ASSISTANT

## 2021-05-08 PROCEDURE — 93005 ELECTROCARDIOGRAM TRACING: CPT | Performed by: EMERGENCY MEDICINE

## 2021-05-08 PROCEDURE — 93010 ELECTROCARDIOGRAM REPORT: CPT | Performed by: INTERNAL MEDICINE

## 2021-05-08 PROCEDURE — 83880 ASSAY OF NATRIURETIC PEPTIDE: CPT | Performed by: PHYSICIAN ASSISTANT

## 2021-05-08 PROCEDURE — 93971 EXTREMITY STUDY: CPT

## 2021-05-08 PROCEDURE — 84484 ASSAY OF TROPONIN QUANT: CPT | Performed by: PHYSICIAN ASSISTANT

## 2021-05-08 RX ORDER — TRAMADOL HYDROCHLORIDE 50 MG/1
50 TABLET ORAL ONCE
Status: COMPLETED | OUTPATIENT
Start: 2021-05-08 | End: 2021-05-08

## 2021-05-08 RX ORDER — TRAMADOL HYDROCHLORIDE 50 MG/1
50 TABLET ORAL EVERY 4 HOURS PRN
Qty: 10 TABLET | Refills: 0 | Status: SHIPPED | OUTPATIENT
Start: 2021-05-08 | End: 2021-06-02 | Stop reason: ALTCHOICE

## 2021-05-08 RX ADMIN — TRAMADOL HYDROCHLORIDE 50 MG: 50 TABLET, FILM COATED ORAL at 19:14

## 2021-05-08 RX ADMIN — MORPHINE SULFATE 4 MG: 4 INJECTION INTRAVENOUS at 17:42

## 2021-05-08 ASSESSMENT — ENCOUNTER SYMPTOMS
EYE PAIN: 0
SORE THROAT: 0
ABDOMINAL PAIN: 0
HEADACHES: 0
CONFUSION: 0
FEVER: 0
PHOTOPHOBIA: 0
CHILLS: 0
DIARRHEA: 0
NAUSEA: 0
NERVOUS/ANXIOUS: 0
FATIGUE: 0
VOMITING: 0
COUGH: 0
NUMBNESS: 0
SHORTNESS OF BREATH: 0
BLOOD IN STOOL: 0
SLEEP DISTURBANCE: 0

## 2021-05-08 ASSESSMENT — PAIN SCALES - GENERAL: PAINLEVEL_OUTOF10: 8

## 2021-05-11 LAB
P AXIS (DEGREES): 63
P AXIS (DEGREES): 64
PR-INTERVAL (MSEC): 127
PR-INTERVAL (MSEC): 137
QRS-INTERVAL (MSEC): 90
QRS-INTERVAL (MSEC): 90
QT-INTERVAL (MSEC): 321
QT-INTERVAL (MSEC): 365
QTC: 390
QTC: 403
R AXIS (DEGREES): 67
R AXIS (DEGREES): 70
REPORT TEXT: NORMAL
REPORT TEXT: NORMAL
T AXIS (DEGREES): 29
T AXIS (DEGREES): 57
VENTRICULAR RATE EKG/MIN (BPM): 116
VENTRICULAR RATE EKG/MIN (BPM): 82

## 2021-05-30 ENCOUNTER — HOSPITAL ENCOUNTER (EMERGENCY)
Age: 36
Discharge: HOME | End: 2021-05-30

## 2021-05-30 VITALS
SYSTOLIC BLOOD PRESSURE: 105 MMHG | RESPIRATION RATE: 18 BRPM | HEART RATE: 110 BPM | BODY MASS INDEX: 21.13 KG/M2 | HEIGHT: 63 IN | TEMPERATURE: 98.3 F | DIASTOLIC BLOOD PRESSURE: 57 MMHG | WEIGHT: 119.27 LBS | OXYGEN SATURATION: 98 %

## 2021-05-30 DIAGNOSIS — M79.605 PAIN IN BOTH LOWER EXTREMITIES: Primary | ICD-10-CM

## 2021-05-30 DIAGNOSIS — Z86.718 HISTORY OF DVT (DEEP VEIN THROMBOSIS): ICD-10-CM

## 2021-05-30 DIAGNOSIS — M79.604 PAIN IN BOTH LOWER EXTREMITIES: Primary | ICD-10-CM

## 2021-05-30 PROCEDURE — 99283 EMERGENCY DEPT VISIT LOW MDM: CPT

## 2021-05-30 PROCEDURE — 10002803 HB RX 637: Performed by: PHYSICIAN ASSISTANT

## 2021-05-30 RX ORDER — TRAMADOL HYDROCHLORIDE 50 MG/1
50 TABLET ORAL EVERY 8 HOURS PRN
Qty: 8 TABLET | Refills: 0 | Status: SHIPPED | OUTPATIENT
Start: 2021-05-30 | End: 2021-06-02 | Stop reason: ALTCHOICE

## 2021-05-30 RX ORDER — TRAMADOL HYDROCHLORIDE 50 MG/1
50 TABLET ORAL ONCE
Status: COMPLETED | OUTPATIENT
Start: 2021-05-30 | End: 2021-05-30

## 2021-05-30 RX ADMIN — TRAMADOL HYDROCHLORIDE 50 MG: 50 TABLET, FILM COATED ORAL at 18:02

## 2021-05-30 ASSESSMENT — PAIN DESCRIPTION - PAIN TYPE: TYPE: ACUTE PAIN

## 2021-05-30 ASSESSMENT — PAIN SCALES - GENERAL
PAINLEVEL_OUTOF10: 10
PAINLEVEL_OUTOF10: 10

## 2021-05-31 ASSESSMENT — ENCOUNTER SYMPTOMS
NAUSEA: 0
SORE THROAT: 0
DIARRHEA: 0
EYE DISCHARGE: 0
VOMITING: 0
TROUBLE SWALLOWING: 0
FEVER: 0
CHILLS: 0
CHEST TIGHTNESS: 0
ABDOMINAL PAIN: 0
COUGH: 0
DIZZINESS: 0
PSYCHIATRIC NEGATIVE: 1
LIGHT-HEADEDNESS: 0
ACTIVITY CHANGE: 0
SHORTNESS OF BREATH: 0
CONSTIPATION: 0
EYE REDNESS: 0
HEADACHES: 0

## 2021-06-02 ENCOUNTER — HOSPITAL ENCOUNTER (EMERGENCY)
Age: 36
Discharge: HOME | End: 2021-06-02

## 2021-06-02 VITALS
HEART RATE: 72 BPM | BODY MASS INDEX: 21.04 KG/M2 | TEMPERATURE: 99.1 F | OXYGEN SATURATION: 100 % | SYSTOLIC BLOOD PRESSURE: 118 MMHG | RESPIRATION RATE: 16 BRPM | HEIGHT: 63 IN | DIASTOLIC BLOOD PRESSURE: 70 MMHG | WEIGHT: 118.72 LBS

## 2021-06-02 DIAGNOSIS — G89.29 CHRONIC PAIN OF RIGHT LOWER EXTREMITY: Primary | ICD-10-CM

## 2021-06-02 DIAGNOSIS — Z79.01 ANTICOAGULATION GOAL OF INR 2.5 TO 3.5: ICD-10-CM

## 2021-06-02 DIAGNOSIS — M79.604 CHRONIC PAIN OF RIGHT LOWER EXTREMITY: Primary | ICD-10-CM

## 2021-06-02 DIAGNOSIS — Z51.81 ANTICOAGULATION GOAL OF INR 2.5 TO 3.5: ICD-10-CM

## 2021-06-02 LAB
APTT PPP: 32 SEC (ref 22–30)
HCG UR QL: NEGATIVE
INR PPP: 2.4
PROTHROMBIN TIME: 23.4 SEC (ref 9.7–11.8)

## 2021-06-02 PROCEDURE — 10002803 HB RX 637: Performed by: PHYSICIAN ASSISTANT

## 2021-06-02 PROCEDURE — 84703 CHORIONIC GONADOTROPIN ASSAY: CPT

## 2021-06-02 PROCEDURE — 85730 THROMBOPLASTIN TIME PARTIAL: CPT | Performed by: PHYSICIAN ASSISTANT

## 2021-06-02 PROCEDURE — 99283 EMERGENCY DEPT VISIT LOW MDM: CPT

## 2021-06-02 PROCEDURE — 85610 PROTHROMBIN TIME: CPT | Performed by: PHYSICIAN ASSISTANT

## 2021-06-02 RX ORDER — TRAMADOL HYDROCHLORIDE 50 MG/1
50 TABLET ORAL EVERY 6 HOURS PRN
Qty: 8 TABLET | Refills: 0 | OUTPATIENT
Start: 2021-06-02 | End: 2022-02-08

## 2021-06-02 RX ORDER — TRAMADOL HYDROCHLORIDE 50 MG/1
50 TABLET ORAL ONCE
Status: COMPLETED | OUTPATIENT
Start: 2021-06-02 | End: 2021-06-02

## 2021-06-02 RX ADMIN — TRAMADOL HYDROCHLORIDE 50 MG: 50 TABLET, FILM COATED ORAL at 20:47

## 2021-06-02 ASSESSMENT — PAIN DESCRIPTION - PAIN TYPE
TYPE: CHRONIC PAIN
TYPE: CHRONIC PAIN

## 2021-06-02 ASSESSMENT — ENCOUNTER SYMPTOMS
WHEEZING: 0
SHORTNESS OF BREATH: 0
DIZZINESS: 0
HEADACHES: 0
EYE DISCHARGE: 0
SORE THROAT: 0
DIARRHEA: 0
FEVER: 0
VOMITING: 0
HALLUCINATIONS: 0
ABDOMINAL PAIN: 0
CHILLS: 0

## 2021-06-02 ASSESSMENT — PAIN SCALES - GENERAL
PAINLEVEL_OUTOF10: 7
PAINLEVEL_OUTOF10: 7
PAINLEVEL_OUTOF10: 10

## 2021-06-07 ENCOUNTER — HOSPITAL ENCOUNTER (EMERGENCY)
Age: 36
Discharge: HOME | End: 2021-06-07

## 2021-06-07 VITALS
DIASTOLIC BLOOD PRESSURE: 78 MMHG | OXYGEN SATURATION: 99 % | HEART RATE: 92 BPM | BODY MASS INDEX: 19.95 KG/M2 | WEIGHT: 116.84 LBS | SYSTOLIC BLOOD PRESSURE: 109 MMHG | RESPIRATION RATE: 18 BRPM | HEIGHT: 64 IN | TEMPERATURE: 98.1 F

## 2021-06-07 DIAGNOSIS — M79.604 RIGHT LEG PAIN: ICD-10-CM

## 2021-06-07 DIAGNOSIS — I82.5Y1 CHRONIC DEEP VEIN THROMBOSIS (DVT) OF PROXIMAL VEIN OF RIGHT LOWER EXTREMITY (CMD): Primary | ICD-10-CM

## 2021-06-07 PROCEDURE — 99283 EMERGENCY DEPT VISIT LOW MDM: CPT

## 2021-06-07 PROCEDURE — 10002803 HB RX 637: Performed by: PHYSICIAN ASSISTANT

## 2021-06-07 RX ORDER — TRAMADOL HYDROCHLORIDE 50 MG/1
50 TABLET ORAL ONCE
Status: COMPLETED | OUTPATIENT
Start: 2021-06-07 | End: 2021-06-07

## 2021-06-07 RX ADMIN — TRAMADOL HYDROCHLORIDE 50 MG: 50 TABLET, FILM COATED ORAL at 12:06

## 2021-06-07 ASSESSMENT — ENCOUNTER SYMPTOMS
EYE DISCHARGE: 0
LIGHT-HEADEDNESS: 0
ACTIVITY CHANGE: 0
VOMITING: 0
SHORTNESS OF BREATH: 0
NAUSEA: 0
PSYCHIATRIC NEGATIVE: 1
CONSTIPATION: 0
ABDOMINAL PAIN: 0
DIZZINESS: 0
CHEST TIGHTNESS: 0
COUGH: 0
FEVER: 0
DIARRHEA: 0
SORE THROAT: 0
TROUBLE SWALLOWING: 0
EYE REDNESS: 0
HEADACHES: 0
CHILLS: 0

## 2021-06-28 ENCOUNTER — HOSPITAL ENCOUNTER (EMERGENCY)
Age: 36
Discharge: HOME | End: 2021-06-28

## 2021-06-28 ENCOUNTER — APPOINTMENT (OUTPATIENT)
Dept: ULTRASOUND IMAGING | Age: 36
End: 2021-06-28

## 2021-06-28 VITALS
WEIGHT: 115.08 LBS | BODY MASS INDEX: 20.39 KG/M2 | OXYGEN SATURATION: 100 % | TEMPERATURE: 98.9 F | DIASTOLIC BLOOD PRESSURE: 75 MMHG | RESPIRATION RATE: 18 BRPM | HEART RATE: 75 BPM | HEIGHT: 63 IN | SYSTOLIC BLOOD PRESSURE: 103 MMHG

## 2021-06-28 DIAGNOSIS — Z76.5 DRUG-SEEKING BEHAVIOR: ICD-10-CM

## 2021-06-28 DIAGNOSIS — M79.604 RIGHT LEG PAIN: Primary | ICD-10-CM

## 2021-06-28 LAB
ANION GAP SERPL CALC-SCNC: 13 MMOL/L (ref 10–20)
APTT PPP: 37 SEC (ref 22–30)
BASOPHILS # BLD: 0 K/MCL (ref 0–0.3)
BASOPHILS NFR BLD: 0 %
BUN SERPL-MCNC: 6 MG/DL (ref 6–20)
BUN/CREAT SERPL: 11 (ref 7–25)
CALCIUM SERPL-MCNC: 9.3 MG/DL (ref 8.4–10.2)
CHLORIDE SERPL-SCNC: 104 MMOL/L (ref 98–107)
CO2 SERPL-SCNC: 26 MMOL/L (ref 21–32)
CREAT SERPL-MCNC: 0.54 MG/DL (ref 0.51–0.95)
DEPRECATED RDW RBC: 46 FL (ref 39–50)
EOSINOPHIL # BLD: 0 K/MCL (ref 0–0.5)
EOSINOPHIL NFR BLD: 0 %
ERYTHROCYTE [DISTWIDTH] IN BLOOD: 13.7 % (ref 11–15)
FASTING DURATION TIME PATIENT: ABNORMAL H
GFR SERPLBLD BASED ON 1.73 SQ M-ARVRAT: >90 ML/MIN/1.73M2
GLUCOSE SERPL-MCNC: 124 MG/DL (ref 65–99)
HCG UR QL: NEGATIVE
HCT VFR BLD CALC: 36.6 % (ref 36–46.5)
HGB BLD-MCNC: 11.6 G/DL (ref 12–15.5)
IMM GRANULOCYTES # BLD AUTO: 0 K/MCL (ref 0–0.2)
IMM GRANULOCYTES # BLD: 0 %
INR PPP: 3
LYMPHOCYTES # BLD: 1.3 K/MCL (ref 1–4.8)
LYMPHOCYTES NFR BLD: 25 %
MCH RBC QN AUTO: 28.5 PG (ref 26–34)
MCHC RBC AUTO-ENTMCNC: 31.7 G/DL (ref 32–36.5)
MCV RBC AUTO: 89.9 FL (ref 78–100)
MONOCYTES # BLD: 0.3 K/MCL (ref 0.3–0.9)
MONOCYTES NFR BLD: 6 %
NEUTROPHILS # BLD: 3.5 K/MCL (ref 1.8–7.7)
NEUTROPHILS NFR BLD: 69 %
NRBC BLD MANUAL-RTO: 0 /100 WBC
PLATELET # BLD AUTO: 335 K/MCL (ref 140–450)
POTASSIUM SERPL-SCNC: 3.7 MMOL/L (ref 3.4–5.1)
PROTHROMBIN TIME: 28.7 SEC (ref 9.7–11.8)
RBC # BLD: 4.07 MIL/MCL (ref 4–5.2)
SODIUM SERPL-SCNC: 139 MMOL/L (ref 135–145)
WBC # BLD: 5.2 K/MCL (ref 4.2–11)

## 2021-06-28 PROCEDURE — 10002803 HB RX 637: Performed by: PHYSICIAN ASSISTANT

## 2021-06-28 PROCEDURE — 93971 EXTREMITY STUDY: CPT

## 2021-06-28 PROCEDURE — 84703 CHORIONIC GONADOTROPIN ASSAY: CPT

## 2021-06-28 PROCEDURE — 99283 EMERGENCY DEPT VISIT LOW MDM: CPT

## 2021-06-28 PROCEDURE — 85730 THROMBOPLASTIN TIME PARTIAL: CPT | Performed by: PHYSICIAN ASSISTANT

## 2021-06-28 PROCEDURE — 85610 PROTHROMBIN TIME: CPT | Performed by: PHYSICIAN ASSISTANT

## 2021-06-28 PROCEDURE — 85025 COMPLETE CBC W/AUTO DIFF WBC: CPT | Performed by: PHYSICIAN ASSISTANT

## 2021-06-28 PROCEDURE — 36415 COLL VENOUS BLD VENIPUNCTURE: CPT

## 2021-06-28 PROCEDURE — 80048 BASIC METABOLIC PNL TOTAL CA: CPT | Performed by: PHYSICIAN ASSISTANT

## 2021-06-28 RX ORDER — TRAMADOL HYDROCHLORIDE 50 MG/1
50 TABLET ORAL ONCE
Status: COMPLETED | OUTPATIENT
Start: 2021-06-28 | End: 2021-06-28

## 2021-06-28 RX ADMIN — TRAMADOL HYDROCHLORIDE 50 MG: 50 TABLET, FILM COATED ORAL at 16:57

## 2021-06-28 ASSESSMENT — PAIN SCALES - GENERAL: PAINLEVEL_OUTOF10: 10

## 2021-06-30 ENCOUNTER — HOSPITAL ENCOUNTER (EMERGENCY)
Age: 36
Discharge: HOME | End: 2021-06-30

## 2021-06-30 VITALS
SYSTOLIC BLOOD PRESSURE: 121 MMHG | OXYGEN SATURATION: 100 % | TEMPERATURE: 98.6 F | HEIGHT: 63 IN | HEART RATE: 99 BPM | DIASTOLIC BLOOD PRESSURE: 75 MMHG | WEIGHT: 115.3 LBS | RESPIRATION RATE: 16 BRPM | BODY MASS INDEX: 20.43 KG/M2

## 2021-06-30 DIAGNOSIS — M79.604 CHRONIC PAIN OF RIGHT LOWER EXTREMITY: Primary | ICD-10-CM

## 2021-06-30 DIAGNOSIS — G89.29 CHRONIC PAIN OF RIGHT LOWER EXTREMITY: Primary | ICD-10-CM

## 2021-06-30 PROCEDURE — 10002803 HB RX 637: Performed by: PHYSICIAN ASSISTANT

## 2021-06-30 PROCEDURE — 99282 EMERGENCY DEPT VISIT SF MDM: CPT

## 2021-06-30 RX ORDER — TRAMADOL HYDROCHLORIDE 50 MG/1
50 TABLET ORAL ONCE
Status: COMPLETED | OUTPATIENT
Start: 2021-06-30 | End: 2021-06-30

## 2021-06-30 RX ADMIN — TRAMADOL HYDROCHLORIDE 50 MG: 50 TABLET, FILM COATED ORAL at 10:13

## 2021-06-30 ASSESSMENT — ENCOUNTER SYMPTOMS
ABDOMINAL PAIN: 0
WHEEZING: 0
ADENOPATHY: 0
NAUSEA: 0
VOMITING: 0
NUMBNESS: 0
DIAPHORESIS: 0
FEVER: 0
RHINORRHEA: 0
WEAKNESS: 0
SHORTNESS OF BREATH: 0
CHILLS: 0
HEADACHES: 0
CONFUSION: 0
BLOOD IN STOOL: 0
SORE THROAT: 0
TROUBLE SWALLOWING: 0
STRIDOR: 0
DIARRHEA: 0
COUGH: 0

## 2021-06-30 ASSESSMENT — PAIN SCALES - GENERAL
PAINLEVEL_OUTOF10: 10
PAINLEVEL_OUTOF10: 10

## 2021-08-06 ENCOUNTER — APPOINTMENT (OUTPATIENT)
Dept: ULTRASOUND IMAGING | Age: 36
End: 2021-08-06

## 2021-08-06 ENCOUNTER — HOSPITAL ENCOUNTER (EMERGENCY)
Age: 36
Discharge: HOME | End: 2021-08-06

## 2021-08-06 VITALS
HEIGHT: 63 IN | RESPIRATION RATE: 14 BRPM | TEMPERATURE: 98.1 F | OXYGEN SATURATION: 99 % | HEART RATE: 82 BPM | WEIGHT: 113.76 LBS | DIASTOLIC BLOOD PRESSURE: 78 MMHG | BODY MASS INDEX: 20.16 KG/M2 | SYSTOLIC BLOOD PRESSURE: 118 MMHG

## 2021-08-06 DIAGNOSIS — M79.661 RIGHT CALF PAIN: Primary | ICD-10-CM

## 2021-08-06 LAB
ALBUMIN SERPL-MCNC: 3.2 G/DL (ref 3.6–5.1)
ALBUMIN/GLOB SERPL: 1 {RATIO} (ref 1–2.4)
ALP SERPL-CCNC: 76 UNITS/L (ref 45–117)
ALT SERPL-CCNC: 19 UNITS/L
ANION GAP SERPL CALC-SCNC: 13 MMOL/L (ref 10–20)
APTT PPP: 35 SEC (ref 22–30)
AST SERPL-CCNC: 11 UNITS/L
BASOPHILS # BLD: 0 K/MCL (ref 0–0.3)
BASOPHILS NFR BLD: 1 %
BILIRUB SERPL-MCNC: 0.1 MG/DL (ref 0.2–1)
BUN SERPL-MCNC: 13 MG/DL (ref 6–20)
BUN/CREAT SERPL: 21 (ref 7–25)
CALCIUM SERPL-MCNC: 8.3 MG/DL (ref 8.4–10.2)
CHLORIDE SERPL-SCNC: 111 MMOL/L (ref 98–107)
CO2 SERPL-SCNC: 25 MMOL/L (ref 21–32)
CREAT SERPL-MCNC: 0.62 MG/DL (ref 0.51–0.95)
DEPRECATED RDW RBC: 44.2 FL (ref 39–50)
EOSINOPHIL # BLD: 0.1 K/MCL (ref 0–0.5)
EOSINOPHIL NFR BLD: 2 %
ERYTHROCYTE [DISTWIDTH] IN BLOOD: 13.4 % (ref 11–15)
FASTING DURATION TIME PATIENT: ABNORMAL H
GFR SERPLBLD BASED ON 1.73 SQ M-ARVRAT: >90 ML/MIN/1.73M2
GLOBULIN SER-MCNC: 3.1 G/DL (ref 2–4)
GLUCOSE SERPL-MCNC: 102 MG/DL (ref 65–99)
HCT VFR BLD CALC: 28.9 % (ref 36–46.5)
HGB BLD-MCNC: 9 G/DL (ref 12–15.5)
IMM GRANULOCYTES # BLD AUTO: 0 K/MCL (ref 0–0.2)
IMM GRANULOCYTES # BLD: 0 %
INR PPP: 2.9
LYMPHOCYTES # BLD: 2.5 K/MCL (ref 1–4.8)
LYMPHOCYTES NFR BLD: 43 %
MCH RBC QN AUTO: 28 PG (ref 26–34)
MCHC RBC AUTO-ENTMCNC: 31.1 G/DL (ref 32–36.5)
MCV RBC AUTO: 90 FL (ref 78–100)
MONOCYTES # BLD: 0.5 K/MCL (ref 0.3–0.9)
MONOCYTES NFR BLD: 8 %
NEUTROPHILS # BLD: 2.8 K/MCL (ref 1.8–7.7)
NEUTROPHILS NFR BLD: 46 %
NRBC BLD MANUAL-RTO: 0 /100 WBC
PLATELET # BLD AUTO: 319 K/MCL (ref 140–450)
POTASSIUM SERPL-SCNC: 3.9 MMOL/L (ref 3.4–5.1)
PROT SERPL-MCNC: 6.3 G/DL (ref 6.4–8.2)
PROTHROMBIN TIME: 27.7 SEC (ref 9.7–11.8)
RBC # BLD: 3.21 MIL/MCL (ref 4–5.2)
SODIUM SERPL-SCNC: 145 MMOL/L (ref 135–145)
WBC # BLD: 6 K/MCL (ref 4.2–11)

## 2021-08-06 PROCEDURE — 85610 PROTHROMBIN TIME: CPT | Performed by: NURSE PRACTITIONER

## 2021-08-06 PROCEDURE — 85025 COMPLETE CBC W/AUTO DIFF WBC: CPT | Performed by: NURSE PRACTITIONER

## 2021-08-06 PROCEDURE — 36415 COLL VENOUS BLD VENIPUNCTURE: CPT

## 2021-08-06 PROCEDURE — 85730 THROMBOPLASTIN TIME PARTIAL: CPT | Performed by: NURSE PRACTITIONER

## 2021-08-06 PROCEDURE — 80053 COMPREHEN METABOLIC PANEL: CPT | Performed by: NURSE PRACTITIONER

## 2021-08-06 PROCEDURE — 99283 EMERGENCY DEPT VISIT LOW MDM: CPT

## 2021-08-06 PROCEDURE — 93971 EXTREMITY STUDY: CPT

## 2021-08-06 PROCEDURE — 10002803 HB RX 637: Performed by: NURSE PRACTITIONER

## 2021-08-06 RX ORDER — TRAMADOL HYDROCHLORIDE 50 MG/1
50 TABLET ORAL ONCE
Status: COMPLETED | OUTPATIENT
Start: 2021-08-06 | End: 2021-08-06

## 2021-08-06 RX ADMIN — TRAMADOL HYDROCHLORIDE 50 MG: 50 TABLET, FILM COATED ORAL at 20:46

## 2021-08-08 ENCOUNTER — HOSPITAL ENCOUNTER (EMERGENCY)
Age: 36
Discharge: HOME | End: 2021-08-08
Attending: EMERGENCY MEDICINE

## 2021-08-08 VITALS
SYSTOLIC BLOOD PRESSURE: 126 MMHG | OXYGEN SATURATION: 100 % | HEART RATE: 82 BPM | RESPIRATION RATE: 18 BRPM | TEMPERATURE: 98.1 F | HEIGHT: 63 IN | BODY MASS INDEX: 20.15 KG/M2 | DIASTOLIC BLOOD PRESSURE: 95 MMHG

## 2021-08-08 DIAGNOSIS — M79.604 CHRONIC PAIN OF RIGHT LOWER EXTREMITY: Primary | ICD-10-CM

## 2021-08-08 DIAGNOSIS — G89.29 CHRONIC PAIN OF RIGHT LOWER EXTREMITY: Primary | ICD-10-CM

## 2021-08-08 LAB
ALBUMIN SERPL-MCNC: 3.9 G/DL (ref 3.6–5.1)
ALBUMIN/GLOB SERPL: 1.1 {RATIO} (ref 1–2.4)
ALP SERPL-CCNC: 75 UNITS/L (ref 45–117)
ALT SERPL-CCNC: 21 UNITS/L
ANION GAP SERPL CALC-SCNC: 7 MMOL/L (ref 10–20)
AST SERPL-CCNC: 10 UNITS/L
BASOPHILS # BLD: 0 K/MCL (ref 0–0.3)
BASOPHILS NFR BLD: 1 %
BILIRUB SERPL-MCNC: 0.2 MG/DL (ref 0.2–1)
BUN SERPL-MCNC: 9 MG/DL (ref 6–20)
BUN/CREAT SERPL: 16 (ref 7–25)
CALCIUM SERPL-MCNC: 8.8 MG/DL (ref 8.4–10.2)
CHLORIDE SERPL-SCNC: 111 MMOL/L (ref 98–107)
CO2 SERPL-SCNC: 27 MMOL/L (ref 21–32)
CREAT SERPL-MCNC: 0.57 MG/DL (ref 0.51–0.95)
DEPRECATED RDW RBC: 44.6 FL (ref 39–50)
EOSINOPHIL # BLD: 0 K/MCL (ref 0–0.5)
EOSINOPHIL NFR BLD: 0 %
ERYTHROCYTE [DISTWIDTH] IN BLOOD: 13.4 % (ref 11–15)
FASTING DURATION TIME PATIENT: ABNORMAL H
GFR SERPLBLD BASED ON 1.73 SQ M-ARVRAT: >90 ML/MIN/1.73M2
GLOBULIN SER-MCNC: 3.7 G/DL (ref 2–4)
GLUCOSE SERPL-MCNC: 98 MG/DL (ref 65–99)
HCT VFR BLD CALC: 33.9 % (ref 36–46.5)
HGB BLD-MCNC: 10.5 G/DL (ref 12–15.5)
IMM GRANULOCYTES # BLD AUTO: 0 K/MCL (ref 0–0.2)
IMM GRANULOCYTES # BLD: 0 %
LYMPHOCYTES # BLD: 1 K/MCL (ref 1–4.8)
LYMPHOCYTES NFR BLD: 20 %
MCH RBC QN AUTO: 28.2 PG (ref 26–34)
MCHC RBC AUTO-ENTMCNC: 31 G/DL (ref 32–36.5)
MCV RBC AUTO: 91.1 FL (ref 78–100)
MONOCYTES # BLD: 0.2 K/MCL (ref 0.3–0.9)
MONOCYTES NFR BLD: 5 %
NEUTROPHILS # BLD: 3.7 K/MCL (ref 1.8–7.7)
NEUTROPHILS NFR BLD: 74 %
NRBC BLD MANUAL-RTO: 0 /100 WBC
PLATELET # BLD AUTO: 386 K/MCL (ref 140–450)
POTASSIUM SERPL-SCNC: 4.7 MMOL/L (ref 3.4–5.1)
PROT SERPL-MCNC: 7.6 G/DL (ref 6.4–8.2)
RBC # BLD: 3.72 MIL/MCL (ref 4–5.2)
SODIUM SERPL-SCNC: 140 MMOL/L (ref 135–145)
TROPONIN I SERPL HS-MCNC: <0.02 NG/ML
WBC # BLD: 5 K/MCL (ref 4.2–11)

## 2021-08-08 PROCEDURE — 84484 ASSAY OF TROPONIN QUANT: CPT | Performed by: EMERGENCY MEDICINE

## 2021-08-08 PROCEDURE — 80053 COMPREHEN METABOLIC PANEL: CPT | Performed by: EMERGENCY MEDICINE

## 2021-08-08 PROCEDURE — 85025 COMPLETE CBC W/AUTO DIFF WBC: CPT | Performed by: EMERGENCY MEDICINE

## 2021-08-08 PROCEDURE — 93010 ELECTROCARDIOGRAM REPORT: CPT | Performed by: RADIOLOGY

## 2021-08-08 PROCEDURE — 36415 COLL VENOUS BLD VENIPUNCTURE: CPT

## 2021-08-08 PROCEDURE — 99284 EMERGENCY DEPT VISIT MOD MDM: CPT

## 2021-08-08 PROCEDURE — 93005 ELECTROCARDIOGRAM TRACING: CPT | Performed by: EMERGENCY MEDICINE

## 2021-08-08 PROCEDURE — 99284 EMERGENCY DEPT VISIT MOD MDM: CPT | Performed by: EMERGENCY MEDICINE

## 2021-08-08 PROCEDURE — 10002803 HB RX 637: Performed by: EMERGENCY MEDICINE

## 2021-08-08 RX ORDER — LIDOCAINE 4 G/G
1 PATCH TOPICAL ONCE
Status: DISCONTINUED | OUTPATIENT
Start: 2021-08-08 | End: 2021-08-08 | Stop reason: HOSPADM

## 2021-08-08 RX ORDER — ACETAMINOPHEN 325 MG/1
975 TABLET ORAL ONCE
Status: DISCONTINUED | OUTPATIENT
Start: 2021-08-08 | End: 2021-08-08 | Stop reason: HOSPADM

## 2021-08-08 RX ORDER — ONDANSETRON 4 MG/1
4 TABLET, ORALLY DISINTEGRATING ORAL ONCE
Status: COMPLETED | OUTPATIENT
Start: 2021-08-08 | End: 2021-08-08

## 2021-08-08 RX ORDER — TRAMADOL HYDROCHLORIDE 50 MG/1
50 TABLET ORAL ONCE
Status: COMPLETED | OUTPATIENT
Start: 2021-08-08 | End: 2021-08-08

## 2021-08-08 RX ADMIN — TRAMADOL HYDROCHLORIDE 50 MG: 50 TABLET, FILM COATED ORAL at 11:22

## 2021-08-08 RX ADMIN — ONDANSETRON 4 MG: 4 TABLET, ORALLY DISINTEGRATING ORAL at 11:20

## 2021-08-08 RX ADMIN — LIDOCAINE 1 PATCH: 246 PATCH TOPICAL at 11:28

## 2021-08-08 ASSESSMENT — ENCOUNTER SYMPTOMS
ADENOPATHY: 0
ABDOMINAL PAIN: 0
CHEST TIGHTNESS: 0
CONFUSION: 0
CHILLS: 0
DIARRHEA: 0
NUMBNESS: 0
FATIGUE: 1
PHOTOPHOBIA: 0
WEAKNESS: 0
ABDOMINAL DISTENTION: 0
NAUSEA: 1
POLYDIPSIA: 0
VOMITING: 0
FEVER: 0
COUGH: 0
WOUND: 0
HEADACHES: 0
DIZZINESS: 0
BRUISES/BLEEDS EASILY: 0
BACK PAIN: 0
SHORTNESS OF BREATH: 0
SPEECH DIFFICULTY: 0
EYE PAIN: 0
SORE THROAT: 0

## 2021-08-09 ENCOUNTER — APPOINTMENT (OUTPATIENT)
Dept: MRI IMAGING | Age: 36
End: 2021-08-09

## 2021-08-09 LAB
ATRIAL RATE (BPM): 77
P AXIS (DEGREES): 63
PR-INTERVAL (MSEC): 122
QRS-INTERVAL (MSEC): 84
QT-INTERVAL (MSEC): 387
QTC: 438
R AXIS (DEGREES): 69
REPORT TEXT: NORMAL
T AXIS (DEGREES): 57
VENTRICULAR RATE EKG/MIN (BPM): 77

## 2021-08-10 ENCOUNTER — HOSPITAL ENCOUNTER (EMERGENCY)
Age: 36
Discharge: HOME | End: 2021-08-10

## 2021-08-10 VITALS
HEIGHT: 63 IN | DIASTOLIC BLOOD PRESSURE: 82 MMHG | WEIGHT: 120 LBS | TEMPERATURE: 98.3 F | SYSTOLIC BLOOD PRESSURE: 135 MMHG | HEART RATE: 109 BPM | RESPIRATION RATE: 20 BRPM | OXYGEN SATURATION: 99 % | BODY MASS INDEX: 21.26 KG/M2

## 2021-08-10 DIAGNOSIS — G89.29 CHRONIC PAIN OF RIGHT LOWER EXTREMITY: Primary | ICD-10-CM

## 2021-08-10 DIAGNOSIS — M79.604 CHRONIC PAIN OF RIGHT LOWER EXTREMITY: Primary | ICD-10-CM

## 2021-08-10 DIAGNOSIS — K04.7 DENTAL ABSCESS: ICD-10-CM

## 2021-08-10 PROCEDURE — 99284 EMERGENCY DEPT VISIT MOD MDM: CPT

## 2021-08-10 PROCEDURE — 10002803 HB RX 637: Performed by: NURSE PRACTITIONER

## 2021-08-10 RX ORDER — HYDROCODONE BITARTRATE AND ACETAMINOPHEN 5; 325 MG/1; MG/1
1 TABLET ORAL ONCE
Status: DISCONTINUED | OUTPATIENT
Start: 2021-08-10 | End: 2021-08-10 | Stop reason: SDUPTHER

## 2021-08-10 RX ORDER — AMOXICILLIN 875 MG/1
875 TABLET, COATED ORAL 2 TIMES DAILY
Qty: 20 TABLET | Refills: 0 | Status: SHIPPED | OUTPATIENT
Start: 2021-08-10 | End: 2021-08-20

## 2021-08-10 RX ORDER — HYDROCODONE BITARTRATE AND ACETAMINOPHEN 5; 325 MG/1; MG/1
2 TABLET ORAL ONCE
Status: COMPLETED | OUTPATIENT
Start: 2021-08-10 | End: 2021-08-10

## 2021-08-10 RX ORDER — LIDOCAINE 4 G/G
1 PATCH TOPICAL ONCE
Status: DISCONTINUED | OUTPATIENT
Start: 2021-08-10 | End: 2021-08-10 | Stop reason: HOSPADM

## 2021-08-10 RX ADMIN — LIDOCAINE 1 PATCH: 246 PATCH TOPICAL at 09:40

## 2021-08-10 RX ADMIN — HYDROCODONE BITARTRATE AND ACETAMINOPHEN 2 TABLET: 5; 325 TABLET ORAL at 09:27

## 2021-08-10 ASSESSMENT — ENCOUNTER SYMPTOMS
CHILLS: 0
VOMITING: 0
CHEST TIGHTNESS: 0
TROUBLE SWALLOWING: 0
ABDOMINAL PAIN: 0
EYE DISCHARGE: 0
SHORTNESS OF BREATH: 0
COLOR CHANGE: 0
ACTIVITY CHANGE: 0
BACK PAIN: 0
HEADACHES: 0
DIARRHEA: 0
APPETITE CHANGE: 0
SORE THROAT: 0
EYE ITCHING: 0
FATIGUE: 0
RHINORRHEA: 0
COUGH: 0
EYE PAIN: 0
EYE REDNESS: 0
FEVER: 0
WOUND: 0
DIZZINESS: 0

## 2021-08-10 ASSESSMENT — PAIN SCALES - GENERAL: PAINLEVEL_OUTOF10: 9

## 2021-09-04 ENCOUNTER — HOSPITAL ENCOUNTER (EMERGENCY)
Age: 36
Discharge: HOME | End: 2021-09-04

## 2021-09-04 VITALS
RESPIRATION RATE: 16 BRPM | HEART RATE: 106 BPM | SYSTOLIC BLOOD PRESSURE: 120 MMHG | OXYGEN SATURATION: 99 % | HEIGHT: 63 IN | WEIGHT: 114 LBS | TEMPERATURE: 98.3 F | DIASTOLIC BLOOD PRESSURE: 81 MMHG | BODY MASS INDEX: 20.2 KG/M2

## 2021-09-04 DIAGNOSIS — G89.29 CHRONIC DENTAL PAIN: Primary | ICD-10-CM

## 2021-09-04 DIAGNOSIS — K08.9 CHRONIC DENTAL PAIN: Primary | ICD-10-CM

## 2021-09-04 DIAGNOSIS — Z76.5 DRUG-SEEKING BEHAVIOR: ICD-10-CM

## 2021-09-04 PROCEDURE — 10002803 HB RX 637: Performed by: PHYSICIAN ASSISTANT

## 2021-09-04 PROCEDURE — 99282 EMERGENCY DEPT VISIT SF MDM: CPT

## 2021-09-04 RX ORDER — CLINDAMYCIN HYDROCHLORIDE 300 MG/1
300 CAPSULE ORAL 3 TIMES DAILY
Qty: 30 CAPSULE | Refills: 0 | Status: SHIPPED | OUTPATIENT
Start: 2021-09-04

## 2021-09-04 RX ORDER — HYDROCODONE BITARTRATE AND ACETAMINOPHEN 7.5; 325 MG/1; MG/1
1 TABLET ORAL ONCE
Status: COMPLETED | OUTPATIENT
Start: 2021-09-04 | End: 2021-09-04

## 2021-09-04 RX ORDER — LIDOCAINE HYDROCHLORIDE 20 MG/ML
5 SOLUTION OROPHARYNGEAL PRN
Qty: 120 ML | Refills: 0 | Status: SHIPPED | OUTPATIENT
Start: 2021-09-04

## 2021-09-04 RX ADMIN — HYDROCODONE BITARTRATE AND ACETAMINOPHEN 1 TABLET: 7.5; 325 TABLET ORAL at 19:31

## 2021-09-04 ASSESSMENT — ENCOUNTER SYMPTOMS
PHOTOPHOBIA: 0
EYE PAIN: 0
CHOKING: 0
PSYCHIATRIC NEGATIVE: 1
SORE THROAT: 0
GASTROINTESTINAL NEGATIVE: 1
ENDOCRINE NEGATIVE: 1
HEMATOLOGIC/LYMPHATIC NEGATIVE: 1
RHINORRHEA: 0
NEUROLOGICAL NEGATIVE: 1
FEVER: 0
CHILLS: 0
VOICE CHANGE: 0
SHORTNESS OF BREATH: 0
SINUS PAIN: 0
COUGH: 0
ALLERGIC/IMMUNOLOGIC NEGATIVE: 1

## 2021-09-04 ASSESSMENT — PAIN SCALES - GENERAL: PAINLEVEL_OUTOF10: 10

## 2021-09-04 ASSESSMENT — VISUAL ACUITY: OU: 1

## 2021-09-05 ENCOUNTER — HOSPITAL ENCOUNTER (EMERGENCY)
Facility: HOSPITAL | Age: 36
Discharge: HOME | End: 2021-09-05
Attending: EMERGENCY MEDICINE
Payer: MEDICAID

## 2021-09-05 ENCOUNTER — HOSPITAL ENCOUNTER (EMERGENCY)
Age: 36
Discharge: HOME | End: 2021-09-05

## 2021-09-05 VITALS
OXYGEN SATURATION: 97 % | DIASTOLIC BLOOD PRESSURE: 79 MMHG | WEIGHT: 110 LBS | RESPIRATION RATE: 18 BRPM | SYSTOLIC BLOOD PRESSURE: 106 MMHG | HEART RATE: 102 BPM | BODY MASS INDEX: 19.49 KG/M2 | HEIGHT: 63 IN

## 2021-09-05 VITALS
WEIGHT: 109.57 LBS | BODY MASS INDEX: 19.41 KG/M2 | RESPIRATION RATE: 20 BRPM | OXYGEN SATURATION: 100 % | HEIGHT: 63 IN | TEMPERATURE: 98.1 F | HEART RATE: 120 BPM | DIASTOLIC BLOOD PRESSURE: 80 MMHG | SYSTOLIC BLOOD PRESSURE: 112 MMHG

## 2021-09-05 DIAGNOSIS — Z76.5 DRUG-SEEKING BEHAVIOR: Primary | ICD-10-CM

## 2021-09-05 DIAGNOSIS — M79.604 LEG PAIN, RIGHT: ICD-10-CM

## 2021-09-05 DIAGNOSIS — K04.7 DENTAL ABSCESS: ICD-10-CM

## 2021-09-05 DIAGNOSIS — K08.89 PAIN, DENTAL: Primary | ICD-10-CM

## 2021-09-05 DIAGNOSIS — Z76.5 DRUG-SEEKING BEHAVIOR: ICD-10-CM

## 2021-09-05 LAB
INR BLD: 1.55 (ref 0.89–1.11)
PSA SERPL DL<=0.01 NG/ML-MCNC: 18.9 SECONDS (ref 12.2–14.5)

## 2021-09-05 PROCEDURE — 99283 EMERGENCY DEPT VISIT LOW MDM: CPT

## 2021-09-05 PROCEDURE — 36415 COLL VENOUS BLD VENIPUNCTURE: CPT

## 2021-09-05 PROCEDURE — 85610 PROTHROMBIN TIME: CPT | Performed by: EMERGENCY MEDICINE

## 2021-09-05 PROCEDURE — 99282 EMERGENCY DEPT VISIT SF MDM: CPT

## 2021-09-05 PROCEDURE — 10002803 HB RX 637: Performed by: NURSE PRACTITIONER

## 2021-09-05 RX ORDER — HYDROCODONE BITARTRATE AND ACETAMINOPHEN 5; 325 MG/1; MG/1
1 TABLET ORAL ONCE
Status: COMPLETED | OUTPATIENT
Start: 2021-09-05 | End: 2021-09-05

## 2021-09-05 RX ORDER — WARFARIN SODIUM 6 MG/1
6 TABLET ORAL DAILY
COMMUNITY

## 2021-09-05 RX ADMIN — HYDROCODONE BITARTRATE AND ACETAMINOPHEN 1 TABLET: 5; 325 TABLET ORAL at 12:27

## 2021-09-05 ASSESSMENT — ENCOUNTER SYMPTOMS
VOMITING: 0
RHINORRHEA: 0
APPETITE CHANGE: 0
COUGH: 0
TROUBLE SWALLOWING: 0
CHEST TIGHTNESS: 0
CHILLS: 0
DIZZINESS: 0
ABDOMINAL PAIN: 0
FATIGUE: 0
DIARRHEA: 0
EYE ITCHING: 0
WOUND: 0
EYE DISCHARGE: 0
EYE REDNESS: 0
ACTIVITY CHANGE: 0
VOICE CHANGE: 0
SORE THROAT: 0
EYE PAIN: 0
SHORTNESS OF BREATH: 0
COLOR CHANGE: 0
BACK PAIN: 0
FEVER: 0
HEADACHES: 0

## 2021-09-05 NOTE — ED PROVIDER NOTES
Patient Seen in: [facility] Emergency Department      History   Patient presents with:  Deep Vein Thrombosis    Stated Complaint: hx dvt, right leg pain    HPI/Subjective:   HPI    This is a 82-year-old female with past medical history of DVT on Cou kg/m²         Physical Exam    GENERAL: Awake, alert oriented x3, nontoxic appearing. SKIN: Normal, warm, and dry. HEENT: Multiple teeth in varying stages of decay and right. Multiple teeth are cracked. Pupils equally round and reactive to light.  Conj

## 2021-09-05 NOTE — ED INITIAL ASSESSMENT (HPI)
Patient presents with c/o two cracked teeth, right lower and left upper. She is unable to see a dentist until September 20th. She also reports right leg pain, hx of DVT on coumadin. Patient reports compliance with all doses of medication.

## 2021-10-07 ENCOUNTER — HOSPITAL ENCOUNTER (EMERGENCY)
Age: 36
Discharge: LEFT BEFORE DISCHARGE | End: 2021-10-07

## 2021-10-07 VITALS
WEIGHT: 115.3 LBS | DIASTOLIC BLOOD PRESSURE: 79 MMHG | TEMPERATURE: 98 F | SYSTOLIC BLOOD PRESSURE: 127 MMHG | HEIGHT: 63 IN | BODY MASS INDEX: 20.43 KG/M2 | HEART RATE: 87 BPM | RESPIRATION RATE: 18 BRPM | OXYGEN SATURATION: 100 %

## 2021-10-07 DIAGNOSIS — Z79.01 SUBTHERAPEUTIC ANTICOAGULATION: Primary | ICD-10-CM

## 2021-10-07 DIAGNOSIS — K08.89 DENTALGIA: ICD-10-CM

## 2021-10-07 DIAGNOSIS — Z51.81 SUBTHERAPEUTIC ANTICOAGULATION: Primary | ICD-10-CM

## 2021-10-07 LAB
INR PPP: 1.5
PROTHROMBIN TIME: 14.7 SEC (ref 9.7–11.8)

## 2021-10-07 PROCEDURE — 36415 COLL VENOUS BLD VENIPUNCTURE: CPT

## 2021-10-07 PROCEDURE — 10002803 HB RX 637: Performed by: NURSE PRACTITIONER

## 2021-10-07 PROCEDURE — 85610 PROTHROMBIN TIME: CPT | Performed by: NURSE PRACTITIONER

## 2021-10-07 PROCEDURE — 99283 EMERGENCY DEPT VISIT LOW MDM: CPT

## 2021-10-07 RX ORDER — TRAMADOL HYDROCHLORIDE 50 MG/1
100 TABLET ORAL ONCE
Status: COMPLETED | OUTPATIENT
Start: 2021-10-07 | End: 2021-10-07

## 2021-10-07 RX ORDER — AMOXICILLIN AND CLAVULANATE POTASSIUM 875; 125 MG/1; MG/1
1 TABLET, FILM COATED ORAL EVERY 12 HOURS
Qty: 20 TABLET | Refills: 0 | Status: SHIPPED | OUTPATIENT
Start: 2021-10-07 | End: 2021-10-17

## 2021-10-07 RX ADMIN — TRAMADOL HYDROCHLORIDE 100 MG: 50 TABLET, FILM COATED ORAL at 14:44

## 2021-10-07 ASSESSMENT — ENCOUNTER SYMPTOMS
SORE THROAT: 0
RHINORRHEA: 0
EYE ITCHING: 0
CHILLS: 0
DIARRHEA: 0
DIZZINESS: 0
EYE PAIN: 0
CHEST TIGHTNESS: 0
FATIGUE: 0
COUGH: 0
COLOR CHANGE: 0
EYE REDNESS: 0
TROUBLE SWALLOWING: 0
WOUND: 0
BACK PAIN: 0
ACTIVITY CHANGE: 0
FEVER: 0
SHORTNESS OF BREATH: 0
ABDOMINAL PAIN: 0
VOMITING: 0
APPETITE CHANGE: 0
EYE DISCHARGE: 0
HEADACHES: 0

## 2021-10-17 ENCOUNTER — HOSPITAL ENCOUNTER (EMERGENCY)
Age: 36
Discharge: HOME | End: 2021-10-17

## 2021-10-17 VITALS
HEIGHT: 63 IN | WEIGHT: 116.4 LBS | HEART RATE: 93 BPM | DIASTOLIC BLOOD PRESSURE: 71 MMHG | SYSTOLIC BLOOD PRESSURE: 116 MMHG | BODY MASS INDEX: 20.62 KG/M2 | RESPIRATION RATE: 18 BRPM | OXYGEN SATURATION: 100 % | TEMPERATURE: 99.7 F

## 2021-10-17 DIAGNOSIS — K08.89 PAIN, DENTAL: Primary | ICD-10-CM

## 2021-10-17 PROCEDURE — 10002803 HB RX 637: Performed by: PHYSICIAN ASSISTANT

## 2021-10-17 PROCEDURE — 99282 EMERGENCY DEPT VISIT SF MDM: CPT

## 2021-10-17 RX ORDER — TRAMADOL HYDROCHLORIDE 50 MG/1
50 TABLET ORAL ONCE
Status: COMPLETED | OUTPATIENT
Start: 2021-10-17 | End: 2021-10-17

## 2021-10-17 RX ADMIN — TRAMADOL HYDROCHLORIDE 50 MG: 50 TABLET ORAL at 15:32

## 2021-10-17 ASSESSMENT — PAIN SCALES - GENERAL: PAINLEVEL_OUTOF10: 10

## 2021-10-17 ASSESSMENT — ENCOUNTER SYMPTOMS
EYE REDNESS: 0
EYE DISCHARGE: 0
DIZZINESS: 0
COUGH: 0
CHILLS: 0
ABDOMINAL PAIN: 0
CHEST TIGHTNESS: 0
NAUSEA: 0
PSYCHIATRIC NEGATIVE: 1
VOMITING: 0
LIGHT-HEADEDNESS: 0
FEVER: 0
HEADACHES: 0
ACTIVITY CHANGE: 0
DIARRHEA: 0
SHORTNESS OF BREATH: 0
SORE THROAT: 0
TROUBLE SWALLOWING: 0
CONSTIPATION: 0

## 2021-10-17 ASSESSMENT — VISUAL ACUITY: OU: 1

## 2021-12-26 ENCOUNTER — HOSPITAL ENCOUNTER (EMERGENCY)
Age: 36
Discharge: HOME | End: 2021-12-26

## 2021-12-26 VITALS
HEART RATE: 98 BPM | TEMPERATURE: 98 F | RESPIRATION RATE: 17 BRPM | DIASTOLIC BLOOD PRESSURE: 69 MMHG | BODY MASS INDEX: 19.39 KG/M2 | HEIGHT: 65 IN | SYSTOLIC BLOOD PRESSURE: 138 MMHG | WEIGHT: 116.4 LBS | OXYGEN SATURATION: 98 %

## 2021-12-26 DIAGNOSIS — M79.661 PAIN OF RIGHT LOWER LEG: Primary | ICD-10-CM

## 2021-12-26 PROCEDURE — 99283 EMERGENCY DEPT VISIT LOW MDM: CPT

## 2021-12-26 PROCEDURE — 10002803 HB RX 637: Performed by: NURSE PRACTITIONER

## 2021-12-26 RX ORDER — HYDROCODONE BITARTRATE AND ACETAMINOPHEN 10; 325 MG/1; MG/1
1 TABLET ORAL ONCE
Status: COMPLETED | OUTPATIENT
Start: 2021-12-26 | End: 2021-12-26

## 2021-12-26 RX ORDER — LIDOCAINE 4 G/G
1 PATCH TOPICAL ONCE
Status: DISCONTINUED | OUTPATIENT
Start: 2021-12-26 | End: 2021-12-26 | Stop reason: HOSPADM

## 2021-12-26 RX ORDER — HYDROCODONE BITARTRATE AND ACETAMINOPHEN 5; 325 MG/1; MG/1
1-2 TABLET ORAL EVERY 4 HOURS PRN
Qty: 12 TABLET | Refills: 0 | Status: SHIPPED | OUTPATIENT
Start: 2021-12-26

## 2021-12-26 RX ADMIN — LIDOCAINE 1 PATCH: 246 PATCH TOPICAL at 16:34

## 2021-12-26 RX ADMIN — HYDROCODONE BITARTRATE AND ACETAMINOPHEN 1 TABLET: 10; 325 TABLET ORAL at 16:35

## 2021-12-26 ASSESSMENT — ENCOUNTER SYMPTOMS
ALLERGIC/IMMUNOLOGIC NEGATIVE: 1
ENDOCRINE NEGATIVE: 1
NEUROLOGICAL NEGATIVE: 1
EYES NEGATIVE: 1
GASTROINTESTINAL NEGATIVE: 1
RESPIRATORY NEGATIVE: 1
TINGLING: 0
LOSS OF SENSATION: 0
HEMATOLOGIC/LYMPHATIC NEGATIVE: 1
CONSTITUTIONAL NEGATIVE: 1
PSYCHIATRIC NEGATIVE: 1
NUMBNESS: 0

## 2022-01-09 ENCOUNTER — HOSPITAL ENCOUNTER (EMERGENCY)
Age: 37
Discharge: HOME | End: 2022-01-09

## 2022-01-09 VITALS
WEIGHT: 118.61 LBS | DIASTOLIC BLOOD PRESSURE: 79 MMHG | HEART RATE: 119 BPM | OXYGEN SATURATION: 100 % | TEMPERATURE: 98.7 F | SYSTOLIC BLOOD PRESSURE: 114 MMHG | BODY MASS INDEX: 21.02 KG/M2 | HEIGHT: 63 IN | RESPIRATION RATE: 18 BRPM

## 2022-01-09 DIAGNOSIS — Z76.5 DRUG-SEEKING BEHAVIOR: ICD-10-CM

## 2022-01-09 DIAGNOSIS — G89.29 CHRONIC PAIN OF RIGHT KNEE: Primary | ICD-10-CM

## 2022-01-09 DIAGNOSIS — M25.561 CHRONIC PAIN OF RIGHT KNEE: Primary | ICD-10-CM

## 2022-01-09 PROCEDURE — 99282 EMERGENCY DEPT VISIT SF MDM: CPT

## 2022-01-09 PROCEDURE — 10002803 HB RX 637: Performed by: PHYSICIAN ASSISTANT

## 2022-01-09 RX ORDER — HYDROCODONE BITARTRATE AND ACETAMINOPHEN 5; 325 MG/1; MG/1
1 TABLET ORAL 3 TIMES DAILY
Qty: 9 TABLET | Refills: 0 | Status: SHIPPED | OUTPATIENT
Start: 2022-01-09 | End: 2022-01-12

## 2022-01-09 RX ORDER — HYDROCODONE BITARTRATE AND ACETAMINOPHEN 10; 325 MG/1; MG/1
1 TABLET ORAL ONCE
Status: COMPLETED | OUTPATIENT
Start: 2022-01-09 | End: 2022-01-09

## 2022-01-09 RX ADMIN — HYDROCODONE BITARTRATE AND ACETAMINOPHEN 1 TABLET: 10; 325 TABLET ORAL at 17:47

## 2022-01-09 ASSESSMENT — ENCOUNTER SYMPTOMS
DIARRHEA: 0
ADENOPATHY: 0
COUGH: 0
CONFUSION: 0
FEVER: 0
NAUSEA: 0
SORE THROAT: 0
SHORTNESS OF BREATH: 0
DIZZINESS: 0
DIAPHORESIS: 0
HEADACHES: 0
WEAKNESS: 0
CHILLS: 0
ABDOMINAL PAIN: 0
VOMITING: 0
RHINORRHEA: 0
NUMBNESS: 0

## 2022-01-09 ASSESSMENT — PAIN SCALES - GENERAL: PAINLEVEL_OUTOF10: 10

## 2022-01-11 ENCOUNTER — HOSPITAL ENCOUNTER (EMERGENCY)
Age: 37
Discharge: LEFT BEFORE DISCHARGE | End: 2022-01-11

## 2022-01-11 VITALS
HEART RATE: 109 BPM | TEMPERATURE: 99.1 F | RESPIRATION RATE: 18 BRPM | DIASTOLIC BLOOD PRESSURE: 84 MMHG | OXYGEN SATURATION: 100 % | BODY MASS INDEX: 20.84 KG/M2 | WEIGHT: 117.62 LBS | HEIGHT: 63 IN | SYSTOLIC BLOOD PRESSURE: 116 MMHG

## 2022-01-11 DIAGNOSIS — Z76.5 DRUG-SEEKING BEHAVIOR: Primary | ICD-10-CM

## 2022-01-11 PROCEDURE — 99283 EMERGENCY DEPT VISIT LOW MDM: CPT

## 2022-01-11 ASSESSMENT — ENCOUNTER SYMPTOMS
PSYCHIATRIC NEGATIVE: 1
NEUROLOGICAL NEGATIVE: 1
RESPIRATORY NEGATIVE: 1
HEMATOLOGIC/LYMPHATIC NEGATIVE: 1
ENDOCRINE NEGATIVE: 1
CONSTITUTIONAL NEGATIVE: 1
EYES NEGATIVE: 1
GASTROINTESTINAL NEGATIVE: 1
ALLERGIC/IMMUNOLOGIC NEGATIVE: 1

## 2022-02-08 ENCOUNTER — HOSPITAL ENCOUNTER (EMERGENCY)
Age: 37
Discharge: HOME | End: 2022-02-08
Attending: EMERGENCY MEDICINE

## 2022-02-08 ENCOUNTER — APPOINTMENT (OUTPATIENT)
Dept: GENERAL RADIOLOGY | Age: 37
End: 2022-02-08
Attending: EMERGENCY MEDICINE

## 2022-02-08 VITALS
WEIGHT: 121.69 LBS | TEMPERATURE: 98.4 F | OXYGEN SATURATION: 100 % | HEART RATE: 92 BPM | RESPIRATION RATE: 16 BRPM | DIASTOLIC BLOOD PRESSURE: 84 MMHG | SYSTOLIC BLOOD PRESSURE: 127 MMHG | BODY MASS INDEX: 21.56 KG/M2

## 2022-02-08 DIAGNOSIS — M25.561 ACUTE PAIN OF RIGHT KNEE: Primary | ICD-10-CM

## 2022-02-08 PROCEDURE — 99283 EMERGENCY DEPT VISIT LOW MDM: CPT

## 2022-02-08 PROCEDURE — 10002803 HB RX 637: Performed by: EMERGENCY MEDICINE

## 2022-02-08 RX ORDER — TRAMADOL HYDROCHLORIDE 50 MG/1
50 TABLET ORAL EVERY 4 HOURS PRN
Qty: 10 TABLET | Refills: 0 | Status: SHIPPED | OUTPATIENT
Start: 2022-02-08

## 2022-02-08 RX ORDER — HYDROCODONE BITARTRATE AND ACETAMINOPHEN 5; 325 MG/1; MG/1
1 TABLET ORAL ONCE
Status: COMPLETED | OUTPATIENT
Start: 2022-02-08 | End: 2022-02-08

## 2022-02-08 RX ADMIN — HYDROCODONE BITARTRATE AND ACETAMINOPHEN 1 TABLET: 5; 325 TABLET ORAL at 11:29

## 2022-02-08 ASSESSMENT — ENCOUNTER SYMPTOMS
VOMITING: 0
SORE THROAT: 0
RHINORRHEA: 0
ABDOMINAL PAIN: 0
COUGH: 0
EYE DISCHARGE: 0
WEAKNESS: 0
SHORTNESS OF BREATH: 0
CHILLS: 0
FEVER: 0
SEIZURES: 0
WOUND: 0

## 2022-02-16 ENCOUNTER — APPOINTMENT (OUTPATIENT)
Dept: ULTRASOUND IMAGING | Age: 37
End: 2022-02-16

## 2022-02-16 ENCOUNTER — HOSPITAL ENCOUNTER (EMERGENCY)
Age: 37
Discharge: HOME | End: 2022-02-16

## 2022-02-16 VITALS
SYSTOLIC BLOOD PRESSURE: 123 MMHG | DIASTOLIC BLOOD PRESSURE: 72 MMHG | TEMPERATURE: 99.6 F | RESPIRATION RATE: 18 BRPM | BODY MASS INDEX: 21.48 KG/M2 | WEIGHT: 121.25 LBS | OXYGEN SATURATION: 100 % | HEART RATE: 102 BPM

## 2022-02-16 DIAGNOSIS — M79.604 CHRONIC PAIN OF RIGHT LOWER EXTREMITY: Primary | ICD-10-CM

## 2022-02-16 DIAGNOSIS — G89.29 CHRONIC PAIN OF RIGHT LOWER EXTREMITY: Primary | ICD-10-CM

## 2022-02-16 PROCEDURE — 99283 EMERGENCY DEPT VISIT LOW MDM: CPT

## 2022-02-16 RX ORDER — WARFARIN SODIUM 6 MG/1
6 TABLET ORAL
COMMUNITY

## 2022-02-16 ASSESSMENT — ENCOUNTER SYMPTOMS
SHORTNESS OF BREATH: 0
ACTIVITY CHANGE: 0
FEVER: 0
CHILLS: 0
PSYCHIATRIC NEGATIVE: 1
COUGH: 0
ABDOMINAL PAIN: 0
DIZZINESS: 0
EYE DISCHARGE: 0
DIARRHEA: 0
TROUBLE SWALLOWING: 0
SORE THROAT: 0
LIGHT-HEADEDNESS: 0
VOMITING: 0
CHEST TIGHTNESS: 0
HEADACHES: 0
CONSTIPATION: 0
NAUSEA: 0
EYE REDNESS: 0

## 2022-02-17 ENCOUNTER — HOSPITAL ENCOUNTER (EMERGENCY)
Age: 37
Discharge: LEFT BEFORE DISCHARGE | End: 2022-02-17

## 2022-02-17 ENCOUNTER — APPOINTMENT (OUTPATIENT)
Dept: GENERAL RADIOLOGY | Age: 37
End: 2022-02-17

## 2022-02-17 ENCOUNTER — APPOINTMENT (OUTPATIENT)
Dept: ULTRASOUND IMAGING | Age: 37
End: 2022-02-17

## 2022-02-17 VITALS
TEMPERATURE: 98.3 F | WEIGHT: 114.64 LBS | BODY MASS INDEX: 20.31 KG/M2 | OXYGEN SATURATION: 100 % | HEIGHT: 63 IN | DIASTOLIC BLOOD PRESSURE: 70 MMHG | RESPIRATION RATE: 18 BRPM | HEART RATE: 120 BPM | SYSTOLIC BLOOD PRESSURE: 110 MMHG

## 2022-02-17 DIAGNOSIS — M79.604 PAIN OF RIGHT LOWER EXTREMITY: Primary | ICD-10-CM

## 2022-02-17 PROCEDURE — 99284 EMERGENCY DEPT VISIT MOD MDM: CPT

## 2022-02-17 RX ORDER — ACETAMINOPHEN 325 MG/1
650 TABLET ORAL ONCE
Status: DISCONTINUED | OUTPATIENT
Start: 2022-02-17 | End: 2022-02-17 | Stop reason: HOSPADM

## 2022-02-17 ASSESSMENT — ENCOUNTER SYMPTOMS
SORE THROAT: 0
FEVER: 0
SHORTNESS OF BREATH: 0
ABDOMINAL PAIN: 0
HEADACHES: 0

## 2022-04-09 ENCOUNTER — EXTERNAL RECORD (OUTPATIENT)
Dept: OTHER | Age: 37
End: 2022-04-09

## 2022-04-11 ENCOUNTER — EXTERNAL RECORD (OUTPATIENT)
Dept: GASTROENTEROLOGY | Age: 37
End: 2022-04-11

## 2022-04-11 ENCOUNTER — EXTERNAL RECORD (OUTPATIENT)
Dept: OTHER | Age: 37
End: 2022-04-11

## 2022-04-11 LAB
ALBUMIN, SERUM (ALB): 3.5 G/DL (ref 3.5–5.7)
ANION GAP: 6 MEQ/L (ref 6.2–14.7)
BLOOD UREA NITROGEN (BUN): 4 MG/DL (ref 7–25)
BUN/CREATININE RATIO: 6.3 (ref 7.4–23)
CALCIUM ALBUMIN ADJUSTED: 8.8 MG/DL (ref 8.6–10.3)
CALCIUM, SERUM: 8.4 MG/DL (ref 8.6–10.3)
CARBON DIOXIDE: 23 MEQ/L (ref 21–31)
CHLORIDE, SERUM: 111 MMOL/L (ref 98–107)
CREATININE: 0.63 MG/DL (ref 0.6–1.2)
EST GLOMERULAR FILTRATION RATE: > 60 ML/MIN
GLUCOSE: 95 MG/DL (ref 70–99)
HEMOGLOBIN: 8.5 GM/DL (ref 12–15.3)
K (POTASSIUM, SERUM): 3.8 MMOL/L (ref 3.5–5.2)
NA (SODIUM, SERUM): 140 MMOL/L (ref 133–144)

## 2022-06-03 ENCOUNTER — HOSPITAL ENCOUNTER (EMERGENCY)
Age: 37
Discharge: LEFT BEFORE DISCHARGE | End: 2022-06-03

## 2022-06-03 VITALS
RESPIRATION RATE: 18 BRPM | WEIGHT: 117.39 LBS | HEIGHT: 63 IN | OXYGEN SATURATION: 100 % | HEART RATE: 154 BPM | TEMPERATURE: 98.7 F | SYSTOLIC BLOOD PRESSURE: 99 MMHG | DIASTOLIC BLOOD PRESSURE: 74 MMHG | BODY MASS INDEX: 20.8 KG/M2

## 2022-06-03 DIAGNOSIS — M25.552 LEFT HIP PAIN: ICD-10-CM

## 2022-06-03 DIAGNOSIS — M79.604 PAIN OF RIGHT LOWER EXTREMITY: ICD-10-CM

## 2022-06-03 DIAGNOSIS — Z76.5 DRUG-SEEKING BEHAVIOR: ICD-10-CM

## 2022-06-03 DIAGNOSIS — Z53.21 ELOPED FROM EMERGENCY DEPARTMENT: Primary | ICD-10-CM

## 2022-06-03 RX ORDER — ACETAMINOPHEN 500 MG
1000 TABLET ORAL ONCE
Status: DISCONTINUED | OUTPATIENT
Start: 2022-06-03 | End: 2022-06-03

## 2022-06-03 ASSESSMENT — ENCOUNTER SYMPTOMS
FEVER: 0
SHORTNESS OF BREATH: 0
ABDOMINAL PAIN: 0
CONFUSION: 0
SORE THROAT: 0
RHINORRHEA: 0
HEADACHES: 0

## 2022-11-26 ENCOUNTER — HOSPITAL ENCOUNTER (EMERGENCY)
Age: 37
Discharge: HOME | End: 2022-11-26

## 2022-11-26 VITALS
SYSTOLIC BLOOD PRESSURE: 113 MMHG | DIASTOLIC BLOOD PRESSURE: 70 MMHG | HEIGHT: 63 IN | BODY MASS INDEX: 23.12 KG/M2 | RESPIRATION RATE: 17 BRPM | WEIGHT: 130.51 LBS | HEART RATE: 103 BPM | TEMPERATURE: 98.5 F | OXYGEN SATURATION: 100 %

## 2022-11-26 DIAGNOSIS — M25.552 CHRONIC PAIN OF BOTH HIPS: Primary | ICD-10-CM

## 2022-11-26 DIAGNOSIS — G89.29 CHRONIC PAIN OF BOTH HIPS: Primary | ICD-10-CM

## 2022-11-26 DIAGNOSIS — M25.551 CHRONIC PAIN OF BOTH HIPS: Primary | ICD-10-CM

## 2022-11-26 PROCEDURE — 10002803 HB RX 637: Performed by: NURSE PRACTITIONER

## 2022-11-26 PROCEDURE — 99283 EMERGENCY DEPT VISIT LOW MDM: CPT

## 2022-11-26 RX ORDER — LIDOCAINE 4 G/G
1 PATCH TOPICAL EVERY 12 HOURS
Qty: 10 PATCH | Refills: 0 | Status: SHIPPED | OUTPATIENT
Start: 2022-11-26

## 2022-11-26 RX ADMIN — ACETAMINOPHEN AND CODEINE PHOSPHATE 1 TABLET: 300; 30 TABLET ORAL at 14:58

## 2022-11-26 ASSESSMENT — PAIN SCALES - GENERAL: PAINLEVEL_OUTOF10: 10

## 2023-09-08 ENCOUNTER — APPOINTMENT (OUTPATIENT)
Dept: ULTRASOUND IMAGING | Age: 38
End: 2023-09-08

## 2023-09-08 ENCOUNTER — APPOINTMENT (OUTPATIENT)
Dept: GENERAL RADIOLOGY | Age: 38
End: 2023-09-08

## 2023-09-08 ENCOUNTER — HOSPITAL ENCOUNTER (EMERGENCY)
Age: 38
Discharge: HOME | End: 2023-09-08
Attending: EMERGENCY MEDICINE

## 2023-09-08 VITALS
HEART RATE: 77 BPM | RESPIRATION RATE: 16 BRPM | WEIGHT: 132.06 LBS | TEMPERATURE: 98.5 F | HEIGHT: 66 IN | SYSTOLIC BLOOD PRESSURE: 103 MMHG | DIASTOLIC BLOOD PRESSURE: 64 MMHG | BODY MASS INDEX: 21.22 KG/M2 | OXYGEN SATURATION: 99 %

## 2023-09-08 DIAGNOSIS — R60.0 BILATERAL LOWER EXTREMITY EDEMA: Primary | ICD-10-CM

## 2023-09-08 LAB
ALBUMIN SERPL-MCNC: 3.6 G/DL (ref 3.6–5.1)
ALBUMIN/GLOB SERPL: 1 {RATIO} (ref 1–2.4)
ALP SERPL-CCNC: 94 UNITS/L (ref 45–117)
ALT SERPL-CCNC: 19 UNITS/L
ANION GAP SERPL CALC-SCNC: 11 MMOL/L (ref 7–19)
APPEARANCE UR: ABNORMAL
AST SERPL-CCNC: 16 UNITS/L
BACTERIA #/AREA URNS HPF: ABNORMAL /HPF
BASOPHILS # BLD: 0 K/MCL (ref 0–0.3)
BASOPHILS NFR BLD: 1 %
BILIRUB SERPL-MCNC: 0.4 MG/DL (ref 0.2–1)
BILIRUB UR QL STRIP: NEGATIVE
BUN SERPL-MCNC: 4 MG/DL (ref 6–20)
BUN/CREAT SERPL: 7 (ref 7–25)
CALCIUM SERPL-MCNC: 8.6 MG/DL (ref 8.4–10.2)
CHLORIDE SERPL-SCNC: 101 MMOL/L (ref 97–110)
CO2 SERPL-SCNC: 27 MMOL/L (ref 21–32)
COLOR UR: ABNORMAL
CREAT SERPL-MCNC: 0.59 MG/DL (ref 0.51–0.95)
DEPRECATED RDW RBC: 45.6 FL (ref 39–50)
EGFRCR SERPLBLD CKD-EPI 2021: >90 ML/MIN/{1.73_M2}
EOSINOPHIL # BLD: 0.1 K/MCL (ref 0–0.5)
EOSINOPHIL NFR BLD: 2 %
ERYTHROCYTE [DISTWIDTH] IN BLOOD: 14.7 % (ref 11–15)
FASTING DURATION TIME PATIENT: ABNORMAL H
FLUAV RNA RESP QL NAA+PROBE: NOT DETECTED
FLUBV RNA RESP QL NAA+PROBE: NOT DETECTED
GLOBULIN SER-MCNC: 3.5 G/DL (ref 2–4)
GLUCOSE SERPL-MCNC: 98 MG/DL (ref 70–99)
GLUCOSE UR STRIP-MCNC: NEGATIVE MG/DL
HCG UR QL: NEGATIVE
HCT VFR BLD CALC: 30.6 % (ref 36–46.5)
HGB BLD-MCNC: 9.5 G/DL (ref 12–15.5)
HGB UR QL STRIP: NEGATIVE
HYALINE CASTS #/AREA URNS LPF: ABNORMAL /LPF
IMM GRANULOCYTES # BLD AUTO: 0 K/MCL (ref 0–0.2)
IMM GRANULOCYTES # BLD: 0 %
KETONES UR STRIP-MCNC: NEGATIVE MG/DL
LEUKOCYTE ESTERASE UR QL STRIP: ABNORMAL
LYMPHOCYTES # BLD: 1.5 K/MCL (ref 1–4.8)
LYMPHOCYTES NFR BLD: 26 %
MCH RBC QN AUTO: 26 PG (ref 26–34)
MCHC RBC AUTO-ENTMCNC: 31 G/DL (ref 32–36.5)
MCV RBC AUTO: 83.6 FL (ref 78–100)
MONOCYTES # BLD: 0.6 K/MCL (ref 0.3–0.9)
MONOCYTES NFR BLD: 9 %
NEUTROPHILS # BLD: 3.7 K/MCL (ref 1.8–7.7)
NEUTROPHILS NFR BLD: 62 %
NITRITE UR QL STRIP: NEGATIVE
NRBC BLD MANUAL-RTO: 0 /100 WBC
PH UR STRIP: 7 [PH] (ref 5–7)
PLATELET # BLD AUTO: 378 K/MCL (ref 140–450)
POTASSIUM SERPL-SCNC: 3.7 MMOL/L (ref 3.4–5.1)
PROT SERPL-MCNC: 7.1 G/DL (ref 6.4–8.2)
PROT UR STRIP-MCNC: NEGATIVE MG/DL
RAINBOW EXTRA TUBES HOLD SPECIMEN: NORMAL
RAINBOW EXTRA TUBES HOLD SPECIMEN: NORMAL
RBC # BLD: 3.66 MIL/MCL (ref 4–5.2)
RBC #/AREA URNS HPF: ABNORMAL /HPF
RSV AG NPH QL IA.RAPID: NOT DETECTED
SARS-COV-2 RNA RESP QL NAA+PROBE: NOT DETECTED
SERVICE CMNT-IMP: NORMAL
SERVICE CMNT-IMP: NORMAL
SODIUM SERPL-SCNC: 135 MMOL/L (ref 135–145)
SP GR UR STRIP: 1.01 (ref 1–1.03)
SQUAMOUS #/AREA URNS HPF: ABNORMAL /HPF
UROBILINOGEN UR STRIP-MCNC: 0.2 MG/DL
WBC # BLD: 6 K/MCL (ref 4.2–11)
WBC #/AREA URNS HPF: ABNORMAL /HPF

## 2023-09-08 PROCEDURE — 87086 URINE CULTURE/COLONY COUNT: CPT

## 2023-09-08 PROCEDURE — 93970 EXTREMITY STUDY: CPT

## 2023-09-08 PROCEDURE — 10002803 HB RX 637: Performed by: EMERGENCY MEDICINE

## 2023-09-08 PROCEDURE — C9803 HOPD COVID-19 SPEC COLLECT: HCPCS

## 2023-09-08 PROCEDURE — 81001 URINALYSIS AUTO W/SCOPE: CPT

## 2023-09-08 PROCEDURE — 85025 COMPLETE CBC W/AUTO DIFF WBC: CPT

## 2023-09-08 PROCEDURE — 99284 EMERGENCY DEPT VISIT MOD MDM: CPT

## 2023-09-08 PROCEDURE — 84703 CHORIONIC GONADOTROPIN ASSAY: CPT

## 2023-09-08 PROCEDURE — 71046 X-RAY EXAM CHEST 2 VIEWS: CPT

## 2023-09-08 PROCEDURE — 80053 COMPREHEN METABOLIC PANEL: CPT

## 2023-09-08 PROCEDURE — 0241U COVID/FLU/RSV PANEL: CPT

## 2023-09-08 RX ORDER — HYDROCODONE BITARTRATE AND ACETAMINOPHEN 5; 325 MG/1; MG/1
1 TABLET ORAL ONCE
Status: COMPLETED | OUTPATIENT
Start: 2023-09-08 | End: 2023-09-08

## 2023-09-08 RX ADMIN — HYDROCODONE BITARTRATE AND ACETAMINOPHEN 1 TABLET: 5; 325 TABLET ORAL at 13:21

## 2023-09-08 ASSESSMENT — PAIN DESCRIPTION - PAIN TYPE: TYPE: ACUTE PAIN

## 2023-09-08 ASSESSMENT — PAIN SCALES - GENERAL
PAINLEVEL_OUTOF10: 9
PAINLEVEL_OUTOF10: 6

## 2023-09-09 LAB — BACTERIA UR CULT: NORMAL

## 2023-09-10 ENCOUNTER — HOSPITAL ENCOUNTER (EMERGENCY)
Age: 38
Discharge: HOME | End: 2023-09-10
Attending: EMERGENCY MEDICINE
Payer: MEDICAID

## 2023-09-10 VITALS
OXYGEN SATURATION: 99 % | BODY MASS INDEX: 25 KG/M2 | DIASTOLIC BLOOD PRESSURE: 70 MMHG | HEART RATE: 86 BPM | TEMPERATURE: 98 F | WEIGHT: 140 LBS | SYSTOLIC BLOOD PRESSURE: 116 MMHG | RESPIRATION RATE: 16 BRPM

## 2023-09-10 DIAGNOSIS — B37.31 CANDIDAL VAGINITIS: ICD-10-CM

## 2023-09-10 DIAGNOSIS — K08.89 PAIN, DENTAL: Primary | ICD-10-CM

## 2023-09-10 LAB — B-HCG UR QL: NEGATIVE

## 2023-09-10 PROCEDURE — 87591 N.GONORRHOEAE DNA AMP PROB: CPT | Performed by: EMERGENCY MEDICINE

## 2023-09-10 PROCEDURE — 87660 TRICHOMONAS VAGIN DIR PROBE: CPT | Performed by: EMERGENCY MEDICINE

## 2023-09-10 PROCEDURE — 99284 EMERGENCY DEPT VISIT MOD MDM: CPT

## 2023-09-10 PROCEDURE — 87480 CANDIDA DNA DIR PROBE: CPT | Performed by: EMERGENCY MEDICINE

## 2023-09-10 PROCEDURE — 81025 URINE PREGNANCY TEST: CPT

## 2023-09-10 PROCEDURE — 87491 CHLMYD TRACH DNA AMP PROBE: CPT | Performed by: EMERGENCY MEDICINE

## 2023-09-10 PROCEDURE — 99283 EMERGENCY DEPT VISIT LOW MDM: CPT

## 2023-09-10 PROCEDURE — 87510 GARDNER VAG DNA DIR PROBE: CPT | Performed by: EMERGENCY MEDICINE

## 2023-09-10 RX ORDER — FLUCONAZOLE 150 MG/1
150 TABLET ORAL
Qty: 3 TABLET | Refills: 0 | Status: SHIPPED | OUTPATIENT
Start: 2023-09-10

## 2023-09-11 LAB
C TRACH DNA SPEC QL NAA+PROBE: NEGATIVE
N GONORRHOEA DNA SPEC QL NAA+PROBE: NEGATIVE

## 2023-11-02 ENCOUNTER — HOSPITAL ENCOUNTER (EMERGENCY)
Age: 38
Discharge: LEFT BEFORE DISCHARGE | End: 2023-11-02
Payer: MEDICAID

## 2023-11-04 ENCOUNTER — HOSPITAL ENCOUNTER (EMERGENCY)
Age: 38
Discharge: HOME | End: 2023-11-04
Attending: EMERGENCY MEDICINE
Payer: MEDICAID

## 2023-11-04 VITALS
SYSTOLIC BLOOD PRESSURE: 101 MMHG | HEART RATE: 94 BPM | BODY MASS INDEX: 24.8 KG/M2 | DIASTOLIC BLOOD PRESSURE: 62 MMHG | TEMPERATURE: 98 F | OXYGEN SATURATION: 99 % | HEIGHT: 63 IN | WEIGHT: 140 LBS | RESPIRATION RATE: 16 BRPM

## 2023-11-04 DIAGNOSIS — N39.0 URINARY TRACT INFECTION WITHOUT HEMATURIA, SITE UNSPECIFIED: Primary | ICD-10-CM

## 2023-11-04 LAB
B-HCG UR QL: NEGATIVE
BILIRUB UR QL STRIP.AUTO: NEGATIVE
COLOR UR AUTO: YELLOW
GLUCOSE UR STRIP.AUTO-MCNC: NEGATIVE MG/DL
KETONES UR STRIP.AUTO-MCNC: NEGATIVE MG/DL
NITRITE UR QL STRIP.AUTO: NEGATIVE
PH UR STRIP.AUTO: 6 [PH] (ref 5–8)
SP GR UR STRIP.AUTO: >=1.03 (ref 1–1.03)
UROBILINOGEN UR STRIP.AUTO-MCNC: 0.2 MG/DL
WBC #/AREA URNS AUTO: >50 /HPF
WBC #/AREA URNS AUTO: >50 /HPF

## 2023-11-04 PROCEDURE — 87086 URINE CULTURE/COLONY COUNT: CPT | Performed by: EMERGENCY MEDICINE

## 2023-11-04 PROCEDURE — 87077 CULTURE AEROBIC IDENTIFY: CPT | Performed by: EMERGENCY MEDICINE

## 2023-11-04 PROCEDURE — 81001 URINALYSIS AUTO W/SCOPE: CPT

## 2023-11-04 PROCEDURE — 99284 EMERGENCY DEPT VISIT MOD MDM: CPT

## 2023-11-04 PROCEDURE — 87480 CANDIDA DNA DIR PROBE: CPT | Performed by: EMERGENCY MEDICINE

## 2023-11-04 PROCEDURE — 81015 MICROSCOPIC EXAM OF URINE: CPT | Performed by: EMERGENCY MEDICINE

## 2023-11-04 PROCEDURE — 87491 CHLMYD TRACH DNA AMP PROBE: CPT | Performed by: EMERGENCY MEDICINE

## 2023-11-04 PROCEDURE — 81015 MICROSCOPIC EXAM OF URINE: CPT

## 2023-11-04 PROCEDURE — 87591 N.GONORRHOEAE DNA AMP PROB: CPT | Performed by: EMERGENCY MEDICINE

## 2023-11-04 PROCEDURE — 87510 GARDNER VAG DNA DIR PROBE: CPT | Performed by: EMERGENCY MEDICINE

## 2023-11-04 PROCEDURE — 87660 TRICHOMONAS VAGIN DIR PROBE: CPT | Performed by: EMERGENCY MEDICINE

## 2023-11-04 PROCEDURE — 81025 URINE PREGNANCY TEST: CPT

## 2023-11-04 RX ORDER — CEPHALEXIN 500 MG/1
500 CAPSULE ORAL 3 TIMES DAILY
Qty: 21 CAPSULE | Refills: 0 | Status: SHIPPED | OUTPATIENT
Start: 2023-11-04 | End: 2023-11-11

## 2023-11-04 RX ORDER — CEPHALEXIN 500 MG/1
500 CAPSULE ORAL ONCE
Status: COMPLETED | OUTPATIENT
Start: 2023-11-04 | End: 2023-11-04

## 2023-11-04 NOTE — ED INITIAL ASSESSMENT (HPI)
Pt reports burning with urination and itching to vaginal area x 2 days. Pt states it feels like a yeast infection.

## 2023-11-04 NOTE — DISCHARGE INSTRUCTIONS
Take the antibiotic as prescribed. Follow-up with your primary care doctor or the gynecologist provided in the next 1 week for reassessment. Discuss additional STI testing at that appointment. Return for any severe pain, fevers or any other concerning symptoms.

## 2023-11-06 LAB
C TRACH DNA SPEC QL NAA+PROBE: NEGATIVE
N GONORRHOEA DNA SPEC QL NAA+PROBE: NEGATIVE

## 2023-12-01 ENCOUNTER — HOSPITAL ENCOUNTER (EMERGENCY)
Facility: HOSPITAL | Age: 38
Discharge: HOME | End: 2023-12-01
Attending: EMERGENCY MEDICINE
Payer: MEDICAID

## 2023-12-01 VITALS
HEART RATE: 104 BPM | RESPIRATION RATE: 16 BRPM | OXYGEN SATURATION: 100 % | TEMPERATURE: 98 F | DIASTOLIC BLOOD PRESSURE: 87 MMHG | SYSTOLIC BLOOD PRESSURE: 124 MMHG

## 2023-12-01 DIAGNOSIS — G89.29 OTHER CHRONIC PAIN: ICD-10-CM

## 2023-12-01 DIAGNOSIS — K02.9 DENTAL CARIES: Primary | ICD-10-CM

## 2023-12-01 PROCEDURE — 99283 EMERGENCY DEPT VISIT LOW MDM: CPT

## 2023-12-01 PROCEDURE — 99284 EMERGENCY DEPT VISIT MOD MDM: CPT

## 2023-12-01 RX ORDER — PENICILLIN V POTASSIUM 500 MG/1
500 TABLET ORAL 2 TIMES DAILY
Qty: 14 TABLET | Refills: 0 | Status: SHIPPED | OUTPATIENT
Start: 2023-12-01 | End: 2023-12-08

## 2023-12-01 NOTE — CM/SW NOTE
CM assistance was requested to provide patient with dental resources. CM put together a pack of information with dental resources. CM went to patient room and she left without any of her paperwork.

## 2023-12-26 ENCOUNTER — HOSPITAL ENCOUNTER (EMERGENCY)
Age: 38
Discharge: HOME | End: 2023-12-26
Attending: EMERGENCY MEDICINE
Payer: MEDICAID

## 2023-12-26 VITALS
RESPIRATION RATE: 14 BRPM | BODY MASS INDEX: 26.58 KG/M2 | HEART RATE: 94 BPM | OXYGEN SATURATION: 100 % | WEIGHT: 150 LBS | HEIGHT: 63 IN | SYSTOLIC BLOOD PRESSURE: 109 MMHG | TEMPERATURE: 98 F | DIASTOLIC BLOOD PRESSURE: 89 MMHG

## 2023-12-26 DIAGNOSIS — K08.89 PAIN, DENTAL: Primary | ICD-10-CM

## 2023-12-26 DIAGNOSIS — F41.9 ANXIETY: ICD-10-CM

## 2023-12-26 PROCEDURE — 99283 EMERGENCY DEPT VISIT LOW MDM: CPT

## 2023-12-26 PROCEDURE — 99284 EMERGENCY DEPT VISIT MOD MDM: CPT

## 2023-12-26 RX ORDER — AMOXICILLIN AND CLAVULANATE POTASSIUM 875; 125 MG/1; MG/1
1 TABLET, FILM COATED ORAL ONCE
Status: COMPLETED | OUTPATIENT
Start: 2023-12-26 | End: 2023-12-26

## 2023-12-26 RX ORDER — ACETAMINOPHEN 500 MG
1000 TABLET ORAL ONCE
Status: COMPLETED | OUTPATIENT
Start: 2023-12-26 | End: 2023-12-26

## 2023-12-26 RX ORDER — AMOXICILLIN AND CLAVULANATE POTASSIUM 875; 125 MG/1; MG/1
1 TABLET, FILM COATED ORAL 2 TIMES DAILY
Qty: 20 TABLET | Refills: 0 | Status: SHIPPED | OUTPATIENT
Start: 2023-12-26 | End: 2024-01-05

## 2023-12-26 NOTE — DISCHARGE INSTRUCTIONS
Therapists' resources. [facility]  Therapist ([facility])  [address] [address] [facility] [address] [facility], [address] [facility], [address], [place], [address], [address]  [email or web address]  [phone]      [facility] [employer]  Pre-licensed Professional ([facility])  [address] [facility], [address], [address]  [email or web address]  [phone]: [address] ([employer] and Jessica Cease)  [address] [facility] [place], [place] Britany Feng, [place] , [address], [address]  [email or web address]  [phone]

## 2023-12-26 NOTE — ED NOTES
[facility] spoke with this pt about having anxiety and panic attacks. Pt reports to have a hx  of anxiety for past couple of years and treating by a psychiatrist. Pt reports to have a hx of panic attacks, last episode was this morning. Pt reports to have panic attack whenever she drinks coffee. Pt reports to be non compliant with her psychiatrist's appointment recently. Pt denies SI/HI/ aggression. Resources provided on therapists and instructed pt to call their office in order to set up the appointment. 12/26/23 1348   Suicide Risk   Source of information for [facility] Patient   In what setting is the screener performed? in person   1. Have you wished you were dead or wished you could go to sleep and not wake up? (past 30 days) 0   2. Have you actually had any thoughts of killing yourself? (past 30 days) 0   6. Have you ever done anything, started to do anything, or prepared to do anything to end your life? (lifetime) No   Past Suicidal Ideation Denies   Loss or Near Loss by Suicide of Family or Friend?  No

## 2023-12-26 NOTE — ED INITIAL ASSESSMENT (HPI)
40 y/o F arrives to ED with c/o anxiety. Patient reports feeling increasingly anxious \"for a couple of weeks\" Patient reports she usually takes xanax but has been out of medication for a month. Patient also reports bilateral upper leg pain; denies injuries.

## 2023-12-31 ENCOUNTER — APPOINTMENT (OUTPATIENT)
Dept: CT IMAGING | Age: 38
End: 2023-12-31
Attending: NURSE PRACTITIONER

## 2023-12-31 ENCOUNTER — HOSPITAL ENCOUNTER (EMERGENCY)
Age: 38
Discharge: HOME | End: 2023-12-31

## 2023-12-31 VITALS
RESPIRATION RATE: 16 BRPM | WEIGHT: 135.8 LBS | OXYGEN SATURATION: 100 % | SYSTOLIC BLOOD PRESSURE: 131 MMHG | DIASTOLIC BLOOD PRESSURE: 89 MMHG | HEART RATE: 62 BPM | BODY MASS INDEX: 24.06 KG/M2 | HEIGHT: 63 IN | TEMPERATURE: 98.2 F

## 2023-12-31 DIAGNOSIS — R10.9 ABDOMINAL PAIN, UNSPECIFIED ABDOMINAL LOCATION: Primary | ICD-10-CM

## 2023-12-31 DIAGNOSIS — K08.89 PAIN, DENTAL: ICD-10-CM

## 2023-12-31 LAB
ALBUMIN SERPL-MCNC: 3.3 G/DL (ref 3.6–5.1)
ALBUMIN/GLOB SERPL: 0.8 {RATIO} (ref 1–2.4)
ALP SERPL-CCNC: 87 UNITS/L (ref 45–117)
ALT SERPL-CCNC: 18 UNITS/L
ANION GAP SERPL CALC-SCNC: 14 MMOL/L (ref 7–19)
APPEARANCE UR: NORMAL
AST SERPL-CCNC: 16 UNITS/L
B-HCG UR QL: NEGATIVE
BASOPHILS # BLD: 0 K/MCL (ref 0–0.3)
BASOPHILS NFR BLD: 0 %
BILIRUB SERPL-MCNC: 0.2 MG/DL (ref 0.2–1)
BILIRUB UR QL STRIP: NEGATIVE
BUN SERPL-MCNC: 9 MG/DL (ref 6–20)
BUN/CREAT SERPL: 15 (ref 7–25)
CALCIUM SERPL-MCNC: 8.1 MG/DL (ref 8.4–10.2)
CHLORIDE SERPL-SCNC: 103 MMOL/L (ref 97–110)
CO2 SERPL-SCNC: 27 MMOL/L (ref 21–32)
COLOR UR: NORMAL
CREAT SERPL-MCNC: 0.6 MG/DL (ref 0.51–0.95)
DEPRECATED RDW RBC: 45.4 FL (ref 39–50)
EGFRCR SERPLBLD CKD-EPI 2021: >90 ML/MIN/{1.73_M2}
EOSINOPHIL # BLD: 0.1 K/MCL (ref 0–0.5)
EOSINOPHIL NFR BLD: 2 %
ERYTHROCYTE [DISTWIDTH] IN BLOOD: 14.9 % (ref 11–15)
FASTING DURATION TIME PATIENT: ABNORMAL H
GLOBULIN SER-MCNC: 4 G/DL (ref 2–4)
GLUCOSE SERPL-MCNC: 100 MG/DL (ref 70–99)
GLUCOSE UR STRIP-MCNC: NEGATIVE MG/DL
HCT VFR BLD CALC: 29.5 % (ref 36–46.5)
HGB BLD-MCNC: 9.5 G/DL (ref 12–15.5)
HGB UR QL STRIP: NEGATIVE
IMM GRANULOCYTES # BLD AUTO: 0 K/MCL (ref 0–0.2)
IMM GRANULOCYTES # BLD: 0 %
KETONES UR STRIP-MCNC: NEGATIVE MG/DL
LEUKOCYTE ESTERASE UR QL STRIP: NEGATIVE
LIPASE SERPL-CCNC: 44 UNITS/L (ref 15–77)
LYMPHOCYTES # BLD: 2.7 K/MCL (ref 1–4.8)
LYMPHOCYTES NFR BLD: 56 %
MCH RBC QN AUTO: 26.5 PG (ref 26–34)
MCHC RBC AUTO-ENTMCNC: 32.2 G/DL (ref 32–36.5)
MCV RBC AUTO: 82.4 FL (ref 78–100)
MONOCYTES # BLD: 0.3 K/MCL (ref 0.3–0.9)
MONOCYTES NFR BLD: 7 %
NEUTROPHILS # BLD: 1.7 K/MCL (ref 1.8–7.7)
NEUTROPHILS NFR BLD: 35 %
NITRITE UR QL STRIP: NEGATIVE
NRBC BLD MANUAL-RTO: 0 /100 WBC
PH UR STRIP: 6.5 [PH] (ref 5–7)
PLATELET # BLD AUTO: 301 K/MCL (ref 140–450)
POTASSIUM SERPL-SCNC: 3.6 MMOL/L (ref 3.4–5.1)
PROT SERPL-MCNC: 7.3 G/DL (ref 6.4–8.2)
PROT UR STRIP-MCNC: NEGATIVE MG/DL
RBC # BLD: 3.58 MIL/MCL (ref 4–5.2)
SODIUM SERPL-SCNC: 140 MMOL/L (ref 135–145)
SP GR UR STRIP: 1.02 (ref 1–1.03)
UROBILINOGEN UR STRIP-MCNC: 0.2 MG/DL
WBC # BLD: 4.8 K/MCL (ref 4.2–11)

## 2023-12-31 PROCEDURE — 81025 URINE PREGNANCY TEST: CPT | Performed by: NURSE PRACTITIONER

## 2023-12-31 PROCEDURE — 81003 URINALYSIS AUTO W/O SCOPE: CPT | Performed by: NURSE PRACTITIONER

## 2023-12-31 PROCEDURE — 10002805 HB CONTRAST AGENT: Performed by: NURSE PRACTITIONER

## 2023-12-31 PROCEDURE — 74177 CT ABD & PELVIS W/CONTRAST: CPT

## 2023-12-31 PROCEDURE — 80053 COMPREHEN METABOLIC PANEL: CPT | Performed by: NURSE PRACTITIONER

## 2023-12-31 PROCEDURE — 10002801 HB RX 250 W/O HCPCS: Performed by: NURSE PRACTITIONER

## 2023-12-31 PROCEDURE — 85025 COMPLETE CBC W/AUTO DIFF WBC: CPT | Performed by: NURSE PRACTITIONER

## 2023-12-31 PROCEDURE — 83690 ASSAY OF LIPASE: CPT | Performed by: NURSE PRACTITIONER

## 2023-12-31 PROCEDURE — 10002807 HB RX 258: Performed by: NURSE PRACTITIONER

## 2023-12-31 PROCEDURE — 10004651 HB RX, NO CHARGE ITEM: Performed by: NURSE PRACTITIONER

## 2023-12-31 RX ORDER — FAMOTIDINE 20 MG/1
20 TABLET, FILM COATED ORAL 2 TIMES DAILY
Qty: 30 TABLET | Refills: 0 | Status: SHIPPED | OUTPATIENT
Start: 2023-12-31 | End: 2024-01-15

## 2023-12-31 RX ORDER — ACETAMINOPHEN 500 MG
1000 TABLET ORAL ONCE
Status: DISCONTINUED | OUTPATIENT
Start: 2023-12-31 | End: 2023-12-31

## 2023-12-31 RX ORDER — AMOXICILLIN 500 MG/1
500 CAPSULE ORAL 3 TIMES DAILY
Qty: 21 CAPSULE | Refills: 0 | Status: SHIPPED | OUTPATIENT
Start: 2023-12-31 | End: 2024-01-07

## 2023-12-31 RX ORDER — ACETAMINOPHEN 500 MG
1000 TABLET ORAL ONCE
Status: COMPLETED | OUTPATIENT
Start: 2023-12-31 | End: 2023-12-31

## 2023-12-31 RX ORDER — FAMOTIDINE 10 MG/ML
20 INJECTION, SOLUTION INTRAVENOUS ONCE
Status: COMPLETED | OUTPATIENT
Start: 2023-12-31 | End: 2023-12-31

## 2023-12-31 RX ADMIN — SODIUM CHLORIDE 1000 ML: 9 INJECTION, SOLUTION INTRAVENOUS at 02:10

## 2023-12-31 RX ADMIN — IOHEXOL 75 ML: 350 INJECTION, SOLUTION INTRAVENOUS at 04:06

## 2023-12-31 RX ADMIN — ACETAMINOPHEN 1000 MG: 500 TABLET ORAL at 03:44

## 2023-12-31 RX ADMIN — FAMOTIDINE 20 MG: 10 INJECTION INTRAVENOUS at 03:42

## 2023-12-31 ASSESSMENT — PAIN SCALES - GENERAL
PAINLEVEL_OUTOF10: 10
PAINLEVEL_OUTOF10: 10

## 2023-12-31 ASSESSMENT — ENCOUNTER SYMPTOMS: ABDOMINAL PAIN: 1

## 2023-12-31 ASSESSMENT — PAIN DESCRIPTION - PAIN TYPE: TYPE: ACUTE PAIN

## 2024-01-01 NOTE — PROGRESS NOTES
presents to triage 5 after being seen in the ER for dental pain. EDC 17 (28 & 4/7 weeks gestation). Patient states \"it feels like my belly is expanding and I'm cramping. \"  No history of pre-term delivery with first child.   Denies complicatio 1

## 2024-01-05 ENCOUNTER — HOSPITAL ENCOUNTER (EMERGENCY)
Age: 39
Discharge: HOME | End: 2024-01-05
Attending: EMERGENCY MEDICINE
Payer: MEDICAID

## 2024-01-05 VITALS
DIASTOLIC BLOOD PRESSURE: 56 MMHG | SYSTOLIC BLOOD PRESSURE: 114 MMHG | HEART RATE: 77 BPM | TEMPERATURE: 98 F | BODY MASS INDEX: 26.58 KG/M2 | RESPIRATION RATE: 16 BRPM | WEIGHT: 150 LBS | HEIGHT: 63 IN | OXYGEN SATURATION: 99 %

## 2024-01-05 DIAGNOSIS — U07.1 COVID-19: Primary | ICD-10-CM

## 2024-01-05 LAB — SARS-COV-2 RNA RESP QL NAA+PROBE: DETECTED

## 2024-01-05 PROCEDURE — 99283 EMERGENCY DEPT VISIT LOW MDM: CPT

## 2024-01-05 NOTE — ED PROVIDER NOTES
Patient Seen in: [facility] In [place]      History     Chief Complaint   Patient presents with    Covid-19 Test     Stated Complaint: wants a covid test    Subjective:   HPI    Patient is 38-year-old female presents emergency room with a history of cough and cold symptoms have been ongoing for the last couple of days with increasing congestion at home.  The patient's father has been sick recently with COVID.  Patient wants a COVID test at this time.  Patient denies history of any other somatic complaints or discomfort at this time.    Objective:   Past Medical History:   Diagnosis Date    Anxiety     Arthritis     Avascular necrosis (HCC)     Endometriosis     Mental disorder     history of anxiety    Ovarian cyst               Past Surgical History:   Procedure Laterality Date     DELIVERY ONLY      HIP SURGERY                  No pertinent social history.            Review of Systems    Positive for stated complaint: wants a covid test  Other systems are as noted in HPI.  Constitutional and vital signs reviewed.      All other systems reviewed and negative except as noted above.    Physical Exam     ED Triage Vitals [24 0734]   /56   Pulse 77   Resp 16   Temp 98 °F (36.7 °C)   Temp src Temporal   SpO2 99 %   O2 Device None (Room air)       Current:/56   Pulse 77   Temp 98 °F (36.7 °C) (Temporal)   Resp 16   Ht 160 cm (5' 3\")   Wt 68 kg   LMP 2023 (Approximate)   SpO2 99%   BMI 26.57 kg/m²         Physical Exam  GENERAL: Well-developed, well-nourished female sitting up breathing easily in no apparent distress.  Patient is nontoxic in appearance.  HEENT: Head is normocephalic, atraumatic. Pupils are 4 mm equally round and reactive to light. Oropharynx is clear. Mucous membranes are moist.  LUNGS: Clear to auscultation bilaterally with no wheeze. There is good equal air entry bilaterally.  HEART: Regular rate and rhythm. Normal S1, S2 no S3, or S4. No  murmur.  NEURO: Patient is awake, alert and oriented to time place and person. Motor strength is 5 over 5 in all 4 extremities. There are no gross motor or sensory deficits appreciated.  Patient is up and ambulatory in the ER with a steady gait and no ataxia.           ED Course     Labs Reviewed   RAPID SARS-COV-2 BY PCR - Abnormal; Notable for the following components:       Result Value    Rapid SARS-CoV-2 by PCR Detected (*)     All other components within normal limits                      MDM      Patient's COVID test found to be positive.  Patient was kept off work for the next several days instructions to encourage fluids and rest at home to return to the ER immediately if symptoms worsen or if any other problems arise.  Patient instructed to encourage supportive care at home.  Patient discharged home at this time.                                   Medical Decision Making      Disposition and Plan     Clinical Impression:  1. COVID-19         Disposition:  Discharge  1/5/2024  8:07 am    Follow-up:  Leonor Whiteside MD  [address] [facility] [address]  [phone]    Follow up in 2 day(s)  or call your md for follow up this week          Medications Prescribed:  Current Discharge Medication List

## 2024-01-05 NOTE — DISCHARGE INSTRUCTIONS
Rest at home  Motrin or tylenol for symptoms at home  Return to the ER if symptoms worsen or if any other problems arise

## 2024-01-21 NOTE — ED PROVIDER NOTES
Patient Seen in: [facility] Emergency Department    History   Patient presents with:  Dental Problem (dental)    Stated Complaint: dental pain    HPI    68-year-old woman comes emerged from complaining of right maxillary molar pain. No fever.   She HEENT: Sclerae anicteric. Conjunctivae show no pallor. Oropharynx clear, mucous membranes moist.  Patient has teeth in poor repair particularly over the right maxillary molar area. Gingiva are not inflamed. No oral abscess.   Neck: supple, no rigidity normal... Well appearing, awake, alert, oriented to person, place, time/situation and in no apparent distress.

## 2024-04-27 ENCOUNTER — APPOINTMENT (OUTPATIENT)
Dept: ULTRASOUND IMAGING | Age: 39
End: 2024-04-27
Attending: NURSE PRACTITIONER
Payer: MEDICAID

## 2024-04-27 ENCOUNTER — HOSPITAL ENCOUNTER (EMERGENCY)
Age: 39
Discharge: HOME | End: 2024-04-27
Attending: EMERGENCY MEDICINE
Payer: MEDICAID

## 2024-04-27 VITALS
OXYGEN SATURATION: 98 % | RESPIRATION RATE: 18 BRPM | SYSTOLIC BLOOD PRESSURE: 110 MMHG | BODY MASS INDEX: 23.92 KG/M2 | DIASTOLIC BLOOD PRESSURE: 73 MMHG | TEMPERATURE: 99 F | WEIGHT: 135 LBS | HEART RATE: 95 BPM | HEIGHT: 63 IN

## 2024-04-27 DIAGNOSIS — M79.89 RIGHT LEG SWELLING: Primary | ICD-10-CM

## 2024-04-27 DIAGNOSIS — Z86.718 HISTORY OF DVT OF LOWER EXTREMITY: ICD-10-CM

## 2024-04-27 PROCEDURE — 93970 EXTREMITY STUDY: CPT | Performed by: NURSE PRACTITIONER

## 2024-04-27 PROCEDURE — 99284 EMERGENCY DEPT VISIT MOD MDM: CPT

## 2024-04-27 RX ORDER — HYDROCODONE BITARTRATE AND ACETAMINOPHEN 5; 325 MG/1; MG/1
1 TABLET ORAL ONCE
Status: COMPLETED | OUTPATIENT
Start: 2024-04-27 | End: 2024-04-27

## 2024-04-27 NOTE — ED PROVIDER NOTES
Patient Seen in: [facility] In [place]      History     Chief Complaint   Patient presents with    Leg Swelling     Stated Complaint: left calf pain, swelling x5 days    Subjective:   HPI    Patient is a 39-year-old female with history of DVT, avascular necrosis and arthritis here for evaluation of right lower extremity pain and swelling.  Patient is a poor historian and inconsistent with specific details.  Patient states for the past 64 days, she has had bilateral lower extremity pain.  Swelling of the right lower extremity started about 6 days ago.  Denies injury or trauma precipitating onset of symptoms.    Left foot pain-worsened over this past week.  Denies injury or trauma precipitating onset of symptoms.  Has been taking Tylenol with minimal relief.  Pain is reproducible with palpation and movement    Denies fevers, chills or flulike symptoms    History of DVTs.  Patient states her primary care physician took her off Coumadin \"about 2 years ago.\"    Patient states her most recent DVT was diagnosed in 2024 at [facility].  Accessing care everywhere, I do not see a diagnosis of DVT from [facility].    Denies shortness of breath or chest pain.  Smokes cigarettes.  Denies EtOH or drug use.  Is not on oral contraceptives.    Objective:   Past Medical History:    Anxiety    Arthritis    Avascular necrosis (HCC)    Endometriosis    Mental disorder    history of anxiety    Ovarian cyst              Past Surgical History:   Procedure Laterality Date     delivery only      Hip surgery                  Social History     Socioeconomic History    Marital status: Single   Tobacco Use    Smoking status: Every Day     Current packs/day: 0.50     Types: Cigarettes    Smokeless tobacco: Never   Vaping Use    Vaping status: Never Used   Substance and Sexual Activity    Alcohol use: Never    Drug use: Never   Social History Narrative    ** Merged History Encounter **          Social  Determinants of Health     Financial Resource Strain: Not At Risk (12/1/2022)    [facility]    Financial Resource Strain     How hard is it for you to pay for the very basics like food, housing, medical care, and heating?: Not hard at all   Food Insecurity: No Food Insecurity (5/24/2023)    [facility]    Food Insecurity     Currently or in the past 3 months, have you worried your food would run out before you had money to buy more?: No     In the past 12 months, have you run out of food or been unable to get more?: No   Transportation Needs: No Transportation Needs (5/24/2023)    [facility]    Transportation Needs     Medical Transportation Needs?: No   Physical Activity: Not on File (10/7/2022)    [facility]     Physical Activity     Physical Activity: 0   Stress: Not on File (10/7/2022)    [facility] from [facility]     Stress     Stress: 0   Social Connections: Not on File (10/7/2022)    [facility] from [facility]     Social Connections     Social Connections and Isolation: 0    Received from [facility]    Housing Stability              Review of Systems    Positive for stated complaint: left calf pain, swelling x5 days  Other systems are as noted in HPI.  Constitutional and vital signs reviewed.      All other systems reviewed and negative except as noted above.    Physical Exam     ED Triage Vitals   BP 04/27/24 1731 105/72   Pulse 04/27/24 1731 97   Resp 04/27/24 1731 20   Temp 04/27/24 1731 98.5 °F (36.9 °C)   Temp src 04/27/24 1731 Oral   SpO2 04/27/24 1731 99 %   O2 Device 04/27/24 1706 None (Room air)       Current:/73   Pulse 95   Temp 98.5 °F (36.9 °C) (Oral)   Resp 18   Ht 160 cm (5' 3\")   Wt 61.2 kg   LMP 04/14/2024 (Exact Date)   SpO2 98%   Breastfeeding  No   BMI 23.91 kg/m²         Physical Exam  Vitals and nursing note reviewed.   Constitutional:       General: She is not in acute distress.     Appearance: She is not ill-appearing, toxic-appearing or diaphoretic.   Eyes:      Conjunctiva/sclera: Conjunctivae normal.      Pupils: Pupils are equal, round, and reactive to light.   Cardiovascular:      Rate and Rhythm: Normal rate.      Pulses: Normal pulses.   Pulmonary:      Effort: Pulmonary effort is normal.   Musculoskeletal:         General: Swelling and tenderness present. No signs of injury.      Right lower le+ Edema present.      Left lower leg: No edema.   Neurological:      General: No focal deficit present.      Cranial Nerves: No cranial nerve deficit or facial asymmetry.      Comments: Patient is lethargic, falling asleep and slurred speech.  Patient declines drug or alcohol use             ED Course   Labs Reviewed - No data to display  US VENOUS DOPPLER LEG BILAT - DIAG IMG (CPT=93970)    Result Date: 2024  CONCLUSION:  No evidence of DVT in bilateral extremities.   LOCATION:  [facility]   Dictated by (CST): Alexy Kendrick MD on 2024 at 6:43 PM     Finalized by (CST): Alexy Kendrick MD on 2024 at 6:44 PM                    MDM               Medical Decision Making  Differentials include but are not limited to DVT, l lymphedema and pelvic compression.  Per ultrasound tech report, patient request pain medication prior to ultrasound.  Norco administered here.  Ultrasound negative for DVT.  Close outpatient follow-up with PCP.  Work note given.  Patient agrees with plan of care.  All questions answered to patient's satisfaction.    Reviewed multiple encounters addressing drug-seeking behavior.  Specific note of history of patient altering prescription from prednisone to Tylenol with codeine.     Patient presentation and plan of care reviewed and formulated with Dr. Alcantara, Emergency department physician        Amount and/or  Complexity of Data Reviewed  Radiology: ordered. Decision-making details documented in ED Course.        Disposition and Plan     Clinical Impression:  1. Right leg swelling    2. History of DVT of lower extremity         Disposition:  Discharge  4/27/2024  7:16 pm    Follow-up:  PCP    Follow up in 2 day(s)            Medications Prescribed:  There are no discharge medications for this patient.

## 2024-04-27 NOTE — ED QUICK NOTES
Went to transport patient to ultrasound and she said she was in a lot of pain. I asked if she could get the pain meds after the test and she said no, it hurts too much when they press.

## 2024-04-27 NOTE — ED QUICK NOTES
Per ultrasound tech, pt kept \"falling asleep\" during the exam. On arrival back to the room pt asking for crackers, given as requested

## 2024-06-22 ENCOUNTER — HOSPITAL ENCOUNTER (EMERGENCY)
Age: 39
Discharge: HOME | End: 2024-06-22

## 2024-06-22 VITALS
DIASTOLIC BLOOD PRESSURE: 59 MMHG | WEIGHT: 148.59 LBS | RESPIRATION RATE: 18 BRPM | HEART RATE: 79 BPM | OXYGEN SATURATION: 100 % | HEIGHT: 65 IN | BODY MASS INDEX: 24.76 KG/M2 | SYSTOLIC BLOOD PRESSURE: 109 MMHG | TEMPERATURE: 96.8 F

## 2024-06-22 DIAGNOSIS — K04.7 DENTAL INFECTION: Primary | ICD-10-CM

## 2024-06-22 DIAGNOSIS — K08.9 POOR DENTITION: ICD-10-CM

## 2024-06-22 PROCEDURE — 99283 EMERGENCY DEPT VISIT LOW MDM: CPT

## 2024-06-22 RX ORDER — CLINDAMYCIN HYDROCHLORIDE 300 MG/1
300 CAPSULE ORAL 3 TIMES DAILY
Qty: 30 CAPSULE | Refills: 0 | Status: SHIPPED | OUTPATIENT
Start: 2024-06-22 | End: 2024-07-02

## 2024-06-22 RX ORDER — ACETAMINOPHEN 500 MG
1000 TABLET ORAL EVERY 6 HOURS PRN
Qty: 30 TABLET | Refills: 0 | Status: SHIPPED | OUTPATIENT
Start: 2024-06-22

## 2024-09-03 PROCEDURE — 93010 ELECTROCARDIOGRAM REPORT: CPT | Performed by: INTERNAL MEDICINE

## (undated) NOTE — ED AVS SNAPSHOT
Ms. Roosevelt Silverio   MRN: [id number]    Department:  [facility] Emergency Department   Date of Visit:  8/11/2017           Disclosure     Insurance plans vary and the physician(s) referred by the ER may not be covered by your plan.  Please contact If you have been prescribed any medication(s), please fill your prescription right away and begin taking the medication(s) as directed    If the emergency physician has read X-rays, these will be re-interpreted by a radiologist.  If there is a significant

## (undated) NOTE — ED AVS SNAPSHOT
Ms. Christine Olivo   MRN: [id number]    Department:  [facility] Emergency Department   Date of Visit:  12/19/2017           Disclosure     Insurance plans vary and the physician(s) referred by the ER may not be covered by your plan.  Please contac tell this physician (or your personal doctor if your instructions are to return to your personal doctor) about any new or lasting problems. The primary care or specialist physician will see patients referred from the [facility] Emergency Department.  Fernando Petersen

## (undated) NOTE — ED AVS SNAPSHOT
[facility] Emergency Department    [facility] [address] [place] [address]    Phone:  [phone]    Fax:  [phone]           Ms. Mclaughlin Counter   MRN: [id number]    Department:  [facility] Emergency Department   Date of Visit: IF THERE IS ANY CHANGE OR WORSENING OF YOUR CONDITION, CALL YOUR PRIMARY CARE PHYSICIAN AT ONCE OR RETURN IMMEDIATELY TO THE EMERGENCY DEPARTMENT.     If you have been prescribed any medication(s), please fill your prescription right away and begin taking t

## (undated) NOTE — ED AVS SNAPSHOT
Ms. Lisa Solano   MRN: [id number]    Department:  [facility] Emergency Department   Date of Visit:  9/9/2017           Disclosure     Insurance plans vary and the physician(s) referred by the ER may not be covered by your plan.  Please contact If you have been prescribed any medication(s), please fill your prescription right away and begin taking the medication(s) as directed    If the emergency physician has read X-rays, these will be re-interpreted by a radiologist.  If there is a significant

## (undated) NOTE — ED AVS SNAPSHOT
[facility] Emergency Department  [facility] [address] [place] [address]  Phone:  [phone]  Fax:  [phone]          Ms. Lisa Solano   MRN: [id number]    Department:  [facility] Emergency Department   Date of Visit:  6/27/2017 IF THERE IS ANY CHANGE OR WORSENING OF YOUR CONDITION, CALL YOUR PRIMARY CARE PHYSICIAN AT ONCE OR RETURN IMMEDIATELY TO THE EMERGENCY DEPARTMENT.     If you have been prescribed any medication(s), please fill your prescription right away and begin taking t

## (undated) NOTE — ED AVS SNAPSHOT
[facility] Emergency Department  [facility] [address] [address]  Phone:  [phone]  Fax:  [phone]          Ms. Sheyla Vincent   MRN: [id number]    Department:  [facility] Emergency Department   Date of Visit:  7/25/2017 IF THERE IS ANY CHANGE OR WORSENING OF YOUR CONDITION, CALL YOUR PRIMARY CARE PHYSICIAN AT ONCE OR RETURN IMMEDIATELY TO THE EMERGENCY DEPARTMENT.     If you have been prescribed any medication(s), please fill your prescription right away and begin taking t

## (undated) NOTE — ED AVS SNAPSHOT
Ms. Manley Nabor   MRN: [id number]    Department:  [facility] Emergency Department   Date of Visit:  1/7/2018           Disclosure     Insurance plans vary and the physician(s) referred by the ER may not be covered by your plan.  Please contact tell this physician (or your personal doctor if your instructions are to return to your personal doctor) about any new or lasting problems. The primary care or specialist physician will see patients referred from the [facility] Emergency Department.  Angel Jacobs

## (undated) NOTE — ED AVS SNAPSHOT
Ms. Ulises Lozada   MRN: [id number]    Department:  [facility] Emergency Department   Date of Visit:  6/28/2018           Disclosure     Insurance plans vary and the physician(s) referred by the ER may not be covered by your plan.  Please contact tell this physician (or your personal doctor if your instructions are to return to your personal doctor) about any new or lasting problems. The primary care or specialist physician will see patients referred from the [facility] Emergency Department.  Salvadore Skiff

## (undated) NOTE — ED AVS SNAPSHOT
[facility] Emergency Department    [place] [address]    Phone:  [phone]    Fax:  [phone]           Ms. Bill Porter   MRN: [id number]    Department:  [facility] Emergency Department   Date of Visit: IF THERE IS ANY CHANGE OR WORSENING OF YOUR CONDITION, CALL YOUR PRIMARY CARE PHYSICIAN AT ONCE OR RETURN IMMEDIATELY TO THE EMERGENCY DEPARTMENT.     If you have been prescribed any medication(s), please fill your prescription right away and begin taking t

## (undated) NOTE — ED AVS SNAPSHOT
Ms. Jas Quach   MRN: [id number]    Department:  [facility] Emergency Department   Date of Visit:  3/3/2018           Disclosure     Insurance plans vary and the physician(s) referred by the ER may not be covered by your plan.  Please contact tell this physician (or your personal doctor if your instructions are to return to your personal doctor) about any new or lasting problems. The primary care or specialist physician will see patients referred from the [facility] Emergency Department.  Bernadette Bain

## (undated) NOTE — LETTER
Date & Time: 1/31/2020, 11:29 AM  Patient: Kristine Bonner  Encounter Provider(s):    Radha Hernandez, DO         This certifies that David Ordoñez, a patient at an [facility], am leaving the facility voluntarily and against the

## (undated) NOTE — LETTER
July 25, 2017    Patient: Blease Loss   Date of Visit: 7/25/2017       To Whom It May Concern:    Angelo Kiser was seen and treated in our emergency department on 7/25/2017. She {Return to school/sport/work:[id number]}.     If you have any question

## (undated) NOTE — ED AVS SNAPSHOT
THE [facility] Emergency Department in [address] [facility]    Phone:  [phone]    Fax:  [phone]           Ms. Juárez Han   MRN: [id number]    Department:  THE [facility] Emergency Department in [place]   Date of Vi Or call [phone]    If you have any problems with your follow-up, please call our  at [phone]    Si usted tiene algun problema con velázquez sequimiento, por favor llame a nuestro adminstrador de casos al [phone]    Expect to Pharmacy Address Phone Number   [facility] [address] ([address] [facility]) [place] ([address] [place] [address].  ([address] [facility]) [phone]   [facility] Now link in the [employer]. Enter your Communication [employer] Activation Code exactly as it appears below along with your Zip Code and Date of Birth to complete the sign-up process. If you do not sign up before the expiration date, you must request a new code.     Jocelynn Sawant

## (undated) NOTE — ED AVS SNAPSHOT
Ms. Ronda Valle   MRN: [id number]    Department:  [facility] Emergency Department   Date of Visit:  3/17/2018           Disclosure     Insurance plans vary and the physician(s) referred by the ER may not be covered by your plan.  Please contact tell this physician (or your personal doctor if your instructions are to return to your personal doctor) about any new or lasting problems. The primary care or specialist physician will see patients referred from the [facility] Emergency Department.  Jose Hanna

## (undated) NOTE — ED AVS SNAPSHOT
Ms. Kerri Morse   MRN: [id number]    Department:  [facility] Emergency Department   Date of Visit:  9/19/2018           Disclosure     Insurance plans vary and the physician(s) referred by the ER may not be covered by your plan.  Please contact tell this physician (or your personal doctor if your instructions are to return to your personal doctor) about any new or lasting problems. The primary care or specialist physician will see patients referred from the [facility] Emergency Department.  Chris Wolf

## (undated) NOTE — ED AVS SNAPSHOT
Ms. Sanford Farley   MRN: [id number]    Department:  [facility] Emergency Department   Date of Visit:  10/26/2017           Disclosure     Insurance plans vary and the physician(s) referred by the ER may not be covered by your plan.  Please contac If you have been prescribed any medication(s), please fill your prescription right away and begin taking the medication(s) as directed    If the emergency physician has read X-rays, these will be re-interpreted by a radiologist.  If there is a significant

## (undated) NOTE — ED AVS SNAPSHOT
Gem Amezcua   MRN: [id number]    Department:  [facility] Emergency Department   Date of Visit:  8/7/2019           Disclosure     Insurance plans vary and the physician(s) referred by the ER may not be covered by your plan.  Please co CARE PHYSICIAN AT ONCE OR RETURN IMMEDIATELY TO THE EMERGENCY DEPARTMENT. If you have been prescribed any medication(s), please fill your prescription right away and begin taking the medication(s) as directed.   If you believe that any of the medications

## (undated) NOTE — LETTER
July 25, 2017    Patient: Flores Ndiaye   Date of Visit: 7/25/2017       To Whom It May Concern:    Jose Raul Knight was seen and treated in our emergency department on 7/25/2017.  She can return to school with these limitations: No lifting greater than

## (undated) NOTE — ED AVS SNAPSHOT
Ms. Savana Sawyer   MRN: [id number]    Department:  [facility] Emergency Department   Date of Visit:  7/7/2019           Disclosure     Insurance plans vary and the physician(s) referred by the ER may not be covered by your plan.  Please contact tell this physician (or your personal doctor if your instructions are to return to your personal doctor) about any new or lasting problems. The primary care or specialist physician will see patients referred from the [facility] Emergency Department.  Dejan Sanchez

## (undated) NOTE — ED AVS SNAPSHOT
Ms. Roosevelt Silverio   MRN: [id number]    Department:  [facility] Emergency Department   Date of Visit:  6/5/2018           Disclosure     Insurance plans vary and the physician(s) referred by the ER may not be covered by your plan.  Please contact tell this physician (or your personal doctor if your instructions are to return to your personal doctor) about any new or lasting problems. The primary care or specialist physician will see patients referred from the [facility] Emergency Department.  Karina Hancock

## (undated) NOTE — ED AVS SNAPSHOT
Ms. Kristine Bonner   MRN: [id number]    Department:  [facility] Emergency Department   Date of Visit:  3/9/2018           Disclosure     Insurance plans vary and the physician(s) referred by the ER may not be covered by your plan.  Please contact tell this physician (or your personal doctor if your instructions are to return to your personal doctor) about any new or lasting problems. The primary care or specialist physician will see patients referred from the [facility] Emergency Department.  Angel Jacobs

## (undated) NOTE — ED AVS SNAPSHOT
Ms. Nina Bell   MRN: [id number]    Department:  [facility] Emergency Department   Date of Visit:  3/19/2018           Disclosure     Insurance plans vary and the physician(s) referred by the ER may not be covered by your plan.  Please contact tell this physician (or your personal doctor if your instructions are to return to your personal doctor) about any new or lasting problems. The primary care or specialist physician will see patients referred from the [facility] Emergency Department.  Annette Banegas

## (undated) NOTE — LETTER
February 24, 2017    Patient: Vi Ko   Date of Visit: 2/23/2017       To Whom It May Concern:    Minh Johnson was seen and treated in our emergency department on 2/23/2017. She may return to work 2/24/2017.     If you have any questions or con

## (undated) NOTE — ED AVS SNAPSHOT
Ms. Joslyn Hammer   MRN: [id number]    Department:  [facility] Emergency Department   Date of Visit:  10/9/2017           Disclosure     Insurance plans vary and the physician(s) referred by the ER may not be covered by your plan.  Please contact If you have been prescribed any medication(s), please fill your prescription right away and begin taking the medication(s) as directed    If the emergency physician has read X-rays, these will be re-interpreted by a radiologist.  If there is a significant

## (undated) NOTE — LETTER
Date & Time: 4/27/2024, 7:16 PM  Patient: Luh Delgado  Encounter Provider(s):    Sheldon Alcantara MD Keane, Mary, APRN       To Whom It May Concern:    Luh Delgado was seen and treated in our department on 4/27/2024.     If you have any questions or concerns, please do not hesitate to call.        _____________________________  Physician/APC Signature

## (undated) NOTE — ED AVS SNAPSHOT
[facility] Emergency Department    [place] [address] [address]    Phone:  [phone]    Fax:  [phone]           Ms. Tammie Walsh   MRN: [id number]    Department:  [facility] Emergency Department   Date of Visit: Or call [phone]    If you have any problems with your follow-up, please call our  at [phone]    Si usted tiene algun problema con velázquez sequimiento, por favor llame a nuestro adminstrador de casos al [phone]    Expect to Pharmacy Address Phone Number   [facility] [address] ([address] [place] ([address] [place] [address].  ([address]) [phone]   [facility] link in the [employer]. Enter your [employer] Activation Code exactly as it appears below along with your Zip Code and Date of Birth to complete the sign-up process. If you do not sign up before the expiration date, you must request a new code.     Rinku Radford

## (undated) NOTE — LETTER
March 19, 2018    Patient: Roosevelt Silverio   Date of Visit: 3/19/2018       To Whom It May Concern:    Traci Schroeder was seen and treated in our emergency department on 3/19/2018. She should not return to work until March 20, 2018.     If you have any q

## (undated) NOTE — LETTER
Date & Time: 7/29/2018, 5:04 PM  Patient: Christine Olivo  Encounter Provider(s):    Oleksandr Noyola MD       To Whom It May Concern:    Celso Griffin was seen and treated in our department on 7/29/2018. She may return to work Tuesday, July 31, 2018.     I

## (undated) NOTE — LETTER
Date & Time: 10/11/2020, 3:56 PM  Patient: Don Ram  Encounter Provider(s):    PRASHANTH Romo       To Whom It May Concern:    Nesha Pritchett was seen and treated in our department on 10/11/2020.     If you have any questions or concer

## (undated) NOTE — LETTER
July 25, 2017    Patient: Marquita Short   Date of Visit: 7/25/2017       To Whom It May Concern:    Madelaine Kaur was seen and treated in our emergency department on 7/25/2017.  She can return to work with these limitations: No lifting greater than 5

## (undated) NOTE — ED AVS SNAPSHOT
Ms. Roosevelt Silverio   MRN: [id number]    Department:  [facility] Emergency Department   Date of Visit:  8/22/2017           Disclosure     Insurance plans vary and the physician(s) referred by the ER may not be covered by your plan.  Please contact If you have been prescribed any medication(s), please fill your prescription right away and begin taking the medication(s) as directed    If the emergency physician has read X-rays, these will be re-interpreted by a radiologist.  If there is a significant

## (undated) NOTE — ED AVS SNAPSHOT
Ms. Jie Bain   MRN: [id number]    Department:  [facility] Emergency Department   Date of Visit:  1/3/2020           Disclosure     Insurance plans vary and the physician(s) referred by the ER may not be covered by your plan.  Please contact tell this physician (or your personal doctor if your instructions are to return to your personal doctor) about any new or lasting problems. The primary care or specialist physician will see patients referred from the [facility] Emergency Department.  Pancho Dietrich

## (undated) NOTE — LETTER
August 22, 2017    Patient: Homero Arzola   Date of Visit: 8/22/2017       To Whom It May Concern:    Brittany Butt was seen and treated in our emergency department on 8/22/2017. She should not return to work until 08/25/2017.     If you have any ques

## (undated) NOTE — ED AVS SNAPSHOT
Ms. Manley Nabor   MRN: [id number]    Department:  [facility] Emergency Department   Date of Visit:  7/29/2018           Disclosure     Insurance plans vary and the physician(s) referred by the ER may not be covered by your plan.  Please contact tell this physician (or your personal doctor if your instructions are to return to your personal doctor) about any new or lasting problems. The primary care or specialist physician will see patients referred from the [facility] Emergency Department.  Chayo Perez

## (undated) NOTE — ED AVS SNAPSHOT
[facility] Emergency Department    [facility] [address] [place] [address]    Phone:  [phone]    Fax:  [phone]           Ms. Kristine Bonner   MRN: [id number]    Department:  [facility] Emergency Department   Date of Visit: [phone] Emergency Department   [phone]       To Check ER Wait Times:  TEXT 'ERwait' to 98217      Click [email or web address]      Or call [phone]    If you have any problems with your follow-up, please call our case [facility] before you leave. After you leave, you should follow the attached instructions. I have read and understand the instructions given to me by my caregivers. [facility]        Pharmacy Address Phone Number   [facility] [address] [place] [address] Drive. Sign up for [employer], your secure online medical record. [employer] will allow you to access patient instructions from your recent visit,  view other health information, and more. To sign up or find more information, go to [email or web address]

## (undated) NOTE — ED AVS SNAPSHOT
Ms. Jie Bain   MRN: [id number]    Department:  [facility] Emergency Department   Date of Visit:  10/24/2019           Disclosure     Insurance plans vary and the physician(s) referred by the ER may not be covered by your plan.  Please contac tell this physician (or your personal doctor if your instructions are to return to your personal doctor) about any new or lasting problems. The primary care or specialist physician will see patients referred from the [facility] Emergency Department.  Jose Hanna

## (undated) NOTE — ED AVS SNAPSHOT
THE [facility] Emergency Department in [address] [facility]    Phone:  [phone]    Fax:  [phone]           Ms. Chuyita Graves   MRN: [id number]    Department:  THE [facility] Emergency Department in [place]   Date of Vi IF THERE IS ANY CHANGE OR WORSENING OF YOUR CONDITION, CALL YOUR PRIMARY CARE PHYSICIAN AT ONCE OR RETURN IMMEDIATELY TO THE EMERGENCY DEPARTMENT.     If you have been prescribed any medication(s), please fill your prescription right away and begin taking t

## (undated) NOTE — ED AVS SNAPSHOT
Ms. Kerrie Hughes   MRN: [id number]    Department:  [facility] Emergency Department   Date of Visit:  8/27/2019           Disclosure     Insurance plans vary and the physician(s) referred by the ER may not be covered by your plan.  Please contact tell this physician (or your personal doctor if your instructions are to return to your personal doctor) about any new or lasting problems. The primary care or specialist physician will see patients referred from the [facility] Emergency Department.  Katja Zayas

## (undated) NOTE — ED AVS SNAPSHOT
[facility] Emergency Department    [facility] [address] [place] [address]    Phone:  [phone]    Fax:  [phone]           Ms. Kristine Bonner   MRN: [id number]    Department:  [facility] Emergency Department   Date of Visit: IF THERE IS ANY CHANGE OR WORSENING OF YOUR CONDITION, CALL YOUR PRIMARY CARE PHYSICIAN AT ONCE OR RETURN IMMEDIATELY TO THE EMERGENCY DEPARTMENT.     If you have been prescribed any medication(s), please fill your prescription right away and begin taking t

## (undated) NOTE — ED AVS SNAPSHOT
[facility] Emergency Department    [facility] [address] [place] [address]    Phone:  [phone]    Fax:  [phone]           Ms. Reji Chung   MRN: [id number]    Department:  [facility] Emergency Department   Date of Visit: If you have any problems with your follow-up, please call our  at [phone]    Si usted tiene algun problema con velázquez sequimiento, por favor llame a nuestro adminstrador de casos al [phone]    Expect to receive an electronic request Hannah Kaiser [address] [facility] ([address]) [address] [facility]. ([address]) [address]  ([address]) [address] [facility] Enter your [employer] Activation Code exactly as it appears below along with your Zip Code and Date of Birth to complete the sign-up process. If you do not sign up before the expiration date, you must request a new code.     Your unique [employer] Access Code:

## (undated) NOTE — ED AVS SNAPSHOT
[facility] Emergency Department    [facility] [address] [place] [address]    Phone:  [phone]    Fax:  [phone]           Ms. Kerrie Hughes   MRN: [id number]    Department:  [facility] Emergency Department   Date of Visit: IF THERE IS ANY CHANGE OR WORSENING OF YOUR CONDITION, CALL YOUR PRIMARY CARE PHYSICIAN AT ONCE OR RETURN IMMEDIATELY TO THE EMERGENCY DEPARTMENT.     If you have been prescribed any medication(s), please fill your prescription right away and begin taking t

## (undated) NOTE — ED AVS SNAPSHOT
Ms. Manley Nabor   MRN: [id number]    Department:  [facility] Emergency Department   Date of Visit:  5/19/2019           Disclosure     Insurance plans vary and the physician(s) referred by the ER may not be covered by your plan.  Please contact tell this physician (or your personal doctor if your instructions are to return to your personal doctor) about any new or lasting problems. The primary care or specialist physician will see patients referred from the [facility] Emergency Department.  Joe Coyne

## (undated) NOTE — ED AVS SNAPSHOT
[facility] Emergency Department    [facility] [address] [place] [address]    Phone:  [phone]    Fax:  [phone]           Ms. Vi Ko   MRN: [id number]    Department:  [facility] Emergency Department   Date of Visit: Expect to receive an electronic request (by e-mail or text) to complete a self-assessment the day after your visit. You may also receive a call from our patient liason soon after your visit.  Also, some patients receive a detailed feedback survey mailed to [address] [phone] [address] ([address] [facility] [id number] [address] ([address]) [address] [place] [address] FINDINGS:  No focal consolidation, pleural effusion, or pneumothorax. Noted is slight dextroscoliosis at the thoracolumbar junction. [employer]     Sign up for [employer], your secure online medical record.   [employer] will allow you to access patient i

## (undated) NOTE — LETTER
Date & Time: 1/5/2024, 8:07 AM  Patient: Luh Delgado  Encounter Provider(s):    Haseeb Ragland MD       To Whom It May Concern:    Luh Delgado was seen and treated in our department on 1/5/2024. She should not return to work until January 8, 2024 .    If you have any questions or concerns, please do not hesitate to call.        _____________________________  Physician/APC Signature